# Patient Record
Sex: FEMALE | Race: BLACK OR AFRICAN AMERICAN | NOT HISPANIC OR LATINO | Employment: UNEMPLOYED | URBAN - METROPOLITAN AREA
[De-identification: names, ages, dates, MRNs, and addresses within clinical notes are randomized per-mention and may not be internally consistent; named-entity substitution may affect disease eponyms.]

---

## 2017-01-16 ENCOUNTER — OFFICE VISIT (OUTPATIENT)
Dept: ENDOCRINOLOGY | Facility: CLINIC | Age: 52
End: 2017-01-16

## 2017-01-16 VITALS
TEMPERATURE: 98.6 F | WEIGHT: 293 LBS | HEIGHT: 64 IN | OXYGEN SATURATION: 100 % | SYSTOLIC BLOOD PRESSURE: 132 MMHG | BODY MASS INDEX: 50.02 KG/M2 | HEART RATE: 89 BPM | DIASTOLIC BLOOD PRESSURE: 79 MMHG

## 2017-01-16 DIAGNOSIS — E66.01 MORBID OBESITY, UNSPECIFIED OBESITY TYPE (H): ICD-10-CM

## 2017-01-16 DIAGNOSIS — F50.9 EATING DISORDER: Primary | ICD-10-CM

## 2017-01-16 DIAGNOSIS — I45.9 RIGHT VENTRICULAR CONDUCTION DELAY: ICD-10-CM

## 2017-01-16 LAB
ANION GAP SERPL CALCULATED.3IONS-SCNC: 5 MMOL/L (ref 3–14)
BUN SERPL-MCNC: 10 MG/DL (ref 7–30)
CALCIUM SERPL-MCNC: 8.5 MG/DL (ref 8.5–10.1)
CHLORIDE SERPL-SCNC: 112 MMOL/L (ref 94–109)
CO2 SERPL-SCNC: 25 MMOL/L (ref 20–32)
CREAT SERPL-MCNC: 0.88 MG/DL (ref 0.52–1.04)
GFR SERPL CREATININE-BSD FRML MDRD: 68 ML/MIN/1.7M2
GLUCOSE SERPL-MCNC: 89 MG/DL (ref 70–99)
POTASSIUM SERPL-SCNC: 4.5 MMOL/L (ref 3.4–5.3)
SODIUM SERPL-SCNC: 142 MMOL/L (ref 133–144)

## 2017-01-16 RX ORDER — ACETAMINOPHEN 500 MG
500 TABLET ORAL
COMMUNITY
Start: 2016-05-24 | End: 2023-08-17

## 2017-01-16 RX ORDER — ALBUTEROL SULFATE 90 UG/1
1-2 AEROSOL, METERED RESPIRATORY (INHALATION)
COMMUNITY
Start: 2016-05-24

## 2017-01-16 RX ORDER — TOPIRAMATE 100 MG/1
100 TABLET, FILM COATED ORAL AT BEDTIME
Qty: 30 TABLET | Refills: 3 | Status: SHIPPED | OUTPATIENT
Start: 2017-01-16 | End: 2017-10-06

## 2017-01-16 ASSESSMENT — ENCOUNTER SYMPTOMS
HALLUCINATIONS: 0
NIGHT SWEATS: 0
PANIC: 0
WEIGHT GAIN: 1
DECREASED CONCENTRATION: 1
FATIGUE: 0
ALTERED TEMPERATURE REGULATION: 0
DEPRESSION: 1
WEIGHT LOSS: 0
POLYPHAGIA: 0
DECREASED APPETITE: 1
NERVOUS/ANXIOUS: 1
INCREASED ENERGY: 1
INSOMNIA: 1
FEVER: 0
CHILLS: 0
POLYDIPSIA: 0

## 2017-01-16 ASSESSMENT — PAIN SCALES - GENERAL: PAINLEVEL: EXTREME PAIN (8)

## 2017-01-16 NOTE — Clinical Note
2017       RE: Kameron Park  [address]  [facility] [address]     Dear Colleague,    Thank you for referring your patient, Kameron Park, to the [facility] MEDICAL WEIGHT MANAGEMENT at [facility]. Please see a copy of my visit note below.        Return Medical Weight Management Note     Kameron Park  MRN:  [id number]  :  1965  SHOAIB:  2017    Dear Veena Stafford,    I had the pleasure of seeing your patient Kameron Park.  She is a 51 year old female who I am continuing to see for treatment of morbid obesity related to:  Hypertension, Depression, Asthma, and back/knee pain    Weight change since last seen 6 months ago is up 3 pounds. Net change is down 36 pounds.    Patient has been working on the following dietary changes: reduced food portions    Patient has been working on the following activity changes: walking, pool therapy but limited by pain in knee, back    Meds: taking Topamax 100 mg without side effects (she self-titrated it up from 50 mg daily). She says it is helping her reduce her food intake. Patient was counseled about risks and side effects. Patient was told about teratogenic side effects and was cautioned about using a reliable method of birth control while using topamax ( she is s/p tubal ligation). Patient wants to continue topamax.    Labs: viewed through Care Everywhere link and creatinine was up to 1.05 as of 2016 at [facility] - she goes to [facility] more frequently (not on Epic)    A/P:  Morbid obesity, stable  Increase topamax from 50 back up to 100mg at  since weight is slightly up and she was losing weight on the higher dose  Cardiology referral to evaluate possible right ventricular conduction delay noted on EKG  - need clearance for phentermine    Patient Instructions:  Food goal: more baked, less fried foods, more water, more veggies  Activity goal: to continue to do yoga and wii-fit regularly,  "increase walking  Medication goal: go back up from 50 mg to 100 mg topiramate daily  Cardiology referral pending  Labs (basic metabolic panel) is pending today to recheck kidney function    RTC 3 months    Time: 25 min spent on evaluation, management, counseling, education, & motivational interviewing with greater than 50 % of the total time was spent on counseling and coordinating care    CURRENT WEIGHT:   300 lbs 8 oz    Wt Readings from Last 4 Encounters:   01/21/17 136.261 kg (300 lb 6.4 oz)   01/16/17 136.306 kg (300 lb 8 oz)   07/25/16 134.9 kg (297 lb 6.4 oz)   04/25/16 137.712 kg (303 lb 9.6 oz)     Height:  5' 4\"  Body Mass Index:  Body mass index is 51.56 kg/(m^2).  Vitals:  B/P: 132/79, P: 89    Diet and Activity Changes Since Last Visit Reviewed With Patient 7/25/2016   With regards to my diet, I am still struggling with: my knee back neck   For breakfast, I typically eat: eggs   For lunch, I typically eat: -   For supper, I typically eat: chicken  corn ri   For snack(s), I typically eat: -   I have made the following changes to my activity/exercise since my last visit: -   With regards to my activity/exercise, I am still struggling with: -     ROS    MEDICATIONS:   Current Outpatient Prescriptions   Medication     acetaminophen (TYLENOL) 500 MG tablet     albuterol (PROAIR HFA/PROVENTIL HFA/VENTOLIN HFA) 108 (90 BASE) MCG/ACT Inhaler     topiramate (TOPAMAX) 100 MG tablet     cetirizine (ZYRTEC) 10 MG tablet     meclizine 25 MG CHEW     LISINOPRIL PO     No current facility-administered medications for this visit.     Weight Loss Medication History Reviewed With Patient 1/16/2017   Which weight loss medications are you currently taking on a regular basis?  Qysmia (phentermine/topiramate)   Are you having any side effects from the weight loss medication that we have prescribed you? No     Sincerely,    Nadine Vallecillo MD        "

## 2017-01-16 NOTE — PATIENT INSTRUCTIONS
Food goal: more baked, less fried foods, more water, more veggies    Activity goal: to continue to do yoga and wii-fit regularly, increase walking    Medication goal: go back up from 50 mg to 100 mg topiramate daily    Cardiology referral pending    Labs (basic metabolic panel) is pending today to recheck kidney function

## 2017-01-16 NOTE — Clinical Note
Patient:  Kameron Park  :   1965  MRN:     [id number]        Ms.Charme CINTIA Park  [address]  [facility] [address]        2017    Dear ,    We are writing to inform you of your test results. Your kidney function testing (creatinine) came back in the normal range this time. Please share this with your primary clinic.      Resulted Orders   Basic metabolic panel   Result Value Ref Range    Sodium 142 133 - 144 mmol/L    Potassium 4.5 3.4 - 5.3 mmol/L    Chloride 112 (H) 94 - 109 mmol/L    Carbon Dioxide 25 20 - 32 mmol/L    Anion Gap 5 3 - 14 mmol/L    Glucose 89 70 - 99 mg/dL    Urea Nitrogen 10 7 - 30 mg/dL    Creatinine 0.88 0.52 - 1.04 mg/dL    GFR Estimate 68 >60 mL/min/1.7m2      Comment:      Non  GFR Calc    GFR Estimate If Black 83 >60 mL/min/1.7m2      Comment:       GFR Calc    Calcium 8.5 8.5 - 10.1 mg/dL       Nadine Vallecillo MD

## 2017-01-16 NOTE — MR AVS SNAPSHOT
After Visit Summary   1/16/2017    Kameron Park    MRN: [id number]           Patient Information     Date Of Birth          1965        Visit Information        Provider Department      1/16/2017 11:20 AM Nadine Vallecillo MD Medical Weight Management        Today's Diagnoses     Eating disorder    -  1     Right ventricular conduction delay         Morbid obesity, unspecified obesity type (H)           Care Instructions    Food goal: more baked, less fried foods, more water, more veggies    Activity goal: to continue to do yoga and wii-fit regularly, increase walking    Medication goal: go back up from 50 mg to 100 mg topiramate daily    Cardiology referral pending    Labs (basic metabolic panel) is pending today to recheck kidney function        Follow-ups after your visit        Additional Services     CARDIOLOGY EVAL ADULT REFERRAL       Your provider has referred you to:  [facility]: [facility] [phone]   [email or web address]    Please be aware that coverage of these services is subject to the terms and limitations of your health insurance plan.  Call member services at your health plan with any benefit or coverage questions.      Type of Referral:  New Cardiology Consult, for evaluation of possible right ventricular conduction delay noted on 2015 EKG. Need eval & ok for phentermine from cardiology.    Timeframe requested:  Within 1 month    Please bring the following to your appointment:  >>   Any x-rays, CTs or MRIs which have been performed.  Contact the facility where they were done to arrange for  prior to your scheduled appointment.    >>   List of current medications  >>   This referral request   >>   Any documents/labs given to you for this referral                  Follow-up notes from your care team     Return in about 2 months (around 3/16/2017).      Your next 10  appointments already scheduled     Jan 21, 2017  8:00 AM   (Arrive by 7:45 AM)   New Patient Visit with UZIEL Odonnell MD   [facility] ([facility])    [address]  3rd Floor  [facility] [id number] [phone]            Apr 24, 2017 11:40 AM   (Arrive by 11:25 AM)   Return Visit with Nadine Vallecillo MD   Medical Weight Management ([facility])    [address]  4th Floor  [facility] [address] [phone]              Who to contact     Please call your clinic at [phone] to:    Ask questions about your health    Make or cancel appointments    Discuss your medicines    Learn about your test results    Speak to your doctor   If you have compliments or concerns about an experience at your clinic, or if you wish to file a complaint, please contact [facility] Relations at [phone] or email us at [email or web address]         Additional Information About Your Visit        [employer] Information     [employer] gives you secure access to your electronic health record. If you see a primary care provider, you can also send messages to your care team and make appointments. If you have questions, please call your primary care clinic.  If you do not have a primary care provider, please call [phone] and they will assist you.      [employer] is an electronic gateway that provides easy, online access to your medical records. With [employer], you can request a clinic appointment, read your test results, renew a prescription or communicate with your care team.     To access your existing account, please contact your [facility] or call [phone] for assistance.        Care EveryWhere ID     This is your Care EveryWhere ID. This could be used by other organizations to access your [place] medical records  [id number]        Your Vitals Were     Pulse  "Temperature Height BMI (Body Mass Index) Pulse Oximetry       89 98.6  F (37  C) (Oral) 5' 4\" 51.56 kg/m2 100%        Blood Pressure from Last 3 Encounters:   01/16/17 132/79   07/25/16 138/84   04/25/16 126/72    Weight from Last 3 Encounters:   01/16/17 300 lb 8 oz   07/25/16 297 lb 6.4 oz   04/25/16 303 lb 9.6 oz              We Performed the Following     Basic metabolic panel     CARDIOLOGY EVAL ADULT REFERRAL          Today's Medication Changes          These changes are accurate as of: 1/16/17  1:57 PM.  If you have any questions, ask your nurse or doctor.               These medicines have changed or have updated prescriptions.        Dose/Directions    topiramate 100 MG tablet   Commonly known as:  TOPAMAX   This may have changed:  how much to take   Used for:  Eating disorder   Changed by:  Nadine Vallecillo MD        Dose:  100 mg   Take 1 tablet (100 mg) by mouth At Bedtime   Quantity:  30 tablet   Refills:  3            Where to get your medicines      These medications were sent to [id number] [address] [address]     Phone:  [phone]    - topiramate 100 MG tablet             Primary Care Provider Office Phone # Fax #    Veena Stafford [phone]       [address] [address]        Thank you!     Thank you for choosing MEDICAL WEIGHT MANAGEMENT  for your care. Our goal is always to provide you with excellent care. Hearing back from our patients is one way we can continue to improve our services. Please take a few minutes to complete the written survey that you may receive in the mail after your visit with us. Thank you!             Your Updated Medication List - Protect others around you: Learn how to safely use, store and throw away your medicines at www.disposemymeds.org.          This list is accurate as of: 1/16/17  1:57 PM.  Always use your most recent " med list.                   Brand Name Dispense Instructions for use    acetaminophen 500 MG tablet    TYLENOL     Take 500 mg by mouth       albuterol 108 (90 BASE) MCG/ACT Inhaler    PROAIR HFA/PROVENTIL HFA/VENTOLIN HFA     Inhale 1-2 puffs into the lungs       cetirizine 10 MG tablet    zyrTEC     Take 10 mg by mouth daily       LISINOPRIL PO      Take 10 mg by mouth daily       meclizine 25 MG Chew      Take 25 mg by mouth 3 times daily       topiramate 100 MG tablet    TOPAMAX    30 tablet    Take 1 tablet (100 mg) by mouth At Bedtime

## 2017-01-16 NOTE — NURSING NOTE
"Chief Complaint   Patient presents with     Follow Up For     weight management       Filed Vitals:    01/16/17 1149   BP: 132/79   Pulse: 89   Temp: 98.6  F (37  C)   TempSrc: Oral   Height: 5' 4\"   Weight: 300 lb 8 oz   SpO2: 100%       Body mass index is 51.56 kg/(m^2).    Liliya Sheffield                            "

## 2017-01-19 ENCOUNTER — PRE VISIT (OUTPATIENT)
Dept: CARDIOLOGY | Facility: CLINIC | Age: 52
End: 2017-01-19

## 2017-01-19 DIAGNOSIS — I10 HTN (HYPERTENSION): Primary | ICD-10-CM

## 2017-01-21 ENCOUNTER — OFFICE VISIT (OUTPATIENT)
Dept: CARDIOLOGY | Facility: CLINIC | Age: 52
End: 2017-01-21
Attending: INTERNAL MEDICINE
Payer: COMMERCIAL

## 2017-01-21 VITALS
WEIGHT: 293 LBS | HEART RATE: 78 BPM | HEIGHT: 64 IN | OXYGEN SATURATION: 99 % | BODY MASS INDEX: 50.02 KG/M2 | SYSTOLIC BLOOD PRESSURE: 121 MMHG | DIASTOLIC BLOOD PRESSURE: 83 MMHG

## 2017-01-21 DIAGNOSIS — E66.01 MORBID OBESITY DUE TO EXCESS CALORIES (H): Primary | ICD-10-CM

## 2017-01-21 PROCEDURE — 93005 ELECTROCARDIOGRAM TRACING: CPT | Mod: ZF

## 2017-01-21 PROCEDURE — 99212 OFFICE O/P EST SF 10 MIN: CPT | Mod: ZF

## 2017-01-21 PROCEDURE — 93010 ELECTROCARDIOGRAM REPORT: CPT | Mod: ZP | Performed by: INTERNAL MEDICINE

## 2017-01-21 PROCEDURE — 99204 OFFICE O/P NEW MOD 45 MIN: CPT | Performed by: INTERNAL MEDICINE

## 2017-01-21 ASSESSMENT — ENCOUNTER SYMPTOMS
CHILLS: 0
INCREASED ENERGY: 1
NERVOUS/ANXIOUS: 1
PANIC: 0
FATIGUE: 0
POLYPHAGIA: 0
DECREASED CONCENTRATION: 1
FEVER: 0
WEIGHT GAIN: 1
WEIGHT LOSS: 0
NIGHT SWEATS: 0
HALLUCINATIONS: 0
POLYDIPSIA: 0
INSOMNIA: 1
DEPRESSION: 1
DECREASED APPETITE: 1
ALTERED TEMPERATURE REGULATION: 1

## 2017-01-21 ASSESSMENT — PAIN SCALES - GENERAL: PAINLEVEL: NO PAIN (0)

## 2017-01-21 NOTE — PROGRESS NOTES
SUBJECTIVE:  Kameron Park is a 51 year old female who presents for evaluation prior to starting weight loss medicine probably Phentermine.  Morbidly obese. Not very active. Denied cardiac symptoms including chest pain,palpitation. Do have SOB due to asthma. Current smoker.No DM.HTN+No other significant past medical history or family history. Premature CAD in family.    Patient Active Problem List    Diagnosis Date Noted     HTN (hypertension) 01/19/2017     Priority: Medium     Morbid obesity (H) 08/07/2014     Priority: Medium     Eating disorder 08/07/2014     Priority: Medium     Problem list name updated by automated process. Provider to review       Depression 08/07/2014     Priority: Medium     Knee pain 08/07/2014     Priority: Medium     Back pain 08/07/2014     Priority: Medium    .  Current Outpatient Prescriptions   Medication Sig     acetaminophen (TYLENOL) 500 MG tablet Take 500 mg by mouth     albuterol (PROAIR HFA/PROVENTIL HFA/VENTOLIN HFA) 108 (90 BASE) MCG/ACT Inhaler Inhale 1-2 puffs into the lungs     topiramate (TOPAMAX) 100 MG tablet Take 1 tablet (100 mg) by mouth At Bedtime     cetirizine (ZYRTEC) 10 MG tablet Take 10 mg by mouth as needed      meclizine 25 MG CHEW Take 25 mg by mouth 3 times daily     LISINOPRIL PO Take 10 mg by mouth daily     No current facility-administered medications for this visit.     History reviewed. No pertinent past medical history.  History reviewed. No pertinent past surgical history.  Allergies   Allergen Reactions     Fish-Derived Products Anaphylaxis     Seafood Swelling     Seasonal Allergies      Other reaction(s): Runny Nose     Social History     Social History     Marital Status:      Spouse Name: N/A     Number of Children: N/A     Years of Education: N/A     Occupational History     Not on file.     Social History Main Topics     Smoking status: Current Every Day Smoker -- 0.10 packs/day     Types: Cigarettes     Smokeless tobacco: Never  "Used     Alcohol Use: Yes     Drug Use: Not on file     Sexual Activity: Not on file     Other Topics Concern     Not on file     Social History Narrative     History reviewed. No pertinent family history.       REVIEW OF SYSTEMS:  General: negative, fever, chills, night sweats  Skin: negative, acne, rash and scaling  Eyes: negative, double vision, eye pain and photophobia  Ears/Nose/Throat: negative, nasal congestion and purulent rhinorrhea  Respiratory: No dyspnea on exertion, No cough and negative  Cardiovascular: negative, palpitations, tachycardia, irregular heart beat, chest pain, exertional chest pain or pressure, paroxysmal nocturnal dyspnea and orthopnea  Gastrointestinal: negative, dysphagia, nausea and vomiting  Genitourinary: negative, nocturia, dysuria and frequency  Musculoskeletal: negative, fracture, back pain and neck pain  Neurologic: negative, headaches, syncope, stroke and paralysis  Psychiatric: negative, nervous breakdown, depression stable and thoughts of self-harm  Hematologic/Lymphatic/Immunologic: negative, bleeding disorder, chills and fever  Endocrine: negative, cold intolerance, heat intolerance and hot flashes       OBJECTIVE:  Blood pressure 121/83, pulse 78, height 1.626 m (5' 4\"), weight 136.261 kg (300 lb 6.4 oz), SpO2 99 %.  General Appearance: alert and no distress  Head: Normocephalic. No masses, lesions, tenderness or abnormalities  Eyes: conjuctiva clear, PERRL, EOM intact  Ears: External ears normal. Canals clear. TM's normal.  Nose: Nares normal  Mouth: normal  Neck: Supple, no cervical adenopathy, no thyromegaly  Lungs: clear to auscultation  Cardiac: regular rate and rhythm, normal S1 and S2, no murmur  Abdomen: Soft, nontender.  Normal bowel sounds.  No hepatosplenomegaly or abnormal masses  Extremities: no peripheral edema, peripheral pulses normal  Musculoskeletal: negative  Neurological: Cranial nerves 2-12 intact, motor strength intact       ASSESSMENT/PLAN:    51 yr " old morbidly obese female on a weightloss program.Planning to start on medication,probably Phentermine.  Patient not very active,but denied cardiac symptoms apart from SOB due to Asthma since childhood.  Current smoker. No DM.HTN+ Lipids unknown. No premature CAD in family.  Will check an echo for baseline information.  EKG reviewed.NSR.Normal.    Patient may have a short course of therapy.Long term usage should be avoided as it may cause cardiac issues.  F/U PRN.  Answers for HPI/ROS submitted by the patient on 1/21/2017   General Symptoms: Yes  Skin Symptoms: No  HENT Symptoms: No  EYE SYMPTOMS: No  HEART SYMPTOMS: No  LUNG SYMPTOMS: No  INTESTINAL SYMPTOMS: No  URINARY SYMPTOMS: No  GYNECOLOGIC SYMPTOMS: No  BREAST SYMPTOMS: No  SKELETAL SYMPTOMS: No  BLOOD SYMPTOMS: No  NERVOUS SYSTEM SYMPTOMS: No  MENTAL HEALTH SYMPTOMS: Yes  Fever: No  Loss of appetite: Yes  Weight loss: No  Weight gain: Yes  Fatigue: No  Night sweats: No  Chills: No  Increased stress: Yes  Excessive hunger: No  Excessive thirst: No  Feeling hot or cold when others believe the temperature is normal: Yes  Loss of height: No  Post-operative complications: No  Surgical site pain: No  Hallucinations: No  Change in or Loss of Energy: Yes  Hyperactivity: No  Confusion: No  Nervous or Anxious: Yes  Depression: Yes  Trouble sleeping: Yes  Trouble thinking or concentrating: Yes  Mood changes: Yes  Panic attacks: No

## 2017-01-21 NOTE — NURSING NOTE
Cardiac Testing: Patient given instructions regarding  echocardiogram . Discussed purpose, preparation, procedure and when to expect results reported back to the patient. Patient demonstrated understanding of this information and agreed to call with further questions or concerns.    Med Reconcile: Reviewed and verified all current medications with the patient. The updated medication list was printed and given to the patient.    Return Appointment: Follow up to be determined based on results of testing. Patient given instructions regarding scheduling next clinic visit. Patient demonstrated understanding of this information and agreed to call with further questions or concerns.    Patient stated she understood all health information given and agreed to call with further questions or concerns.    Del Armstrong, RN  RN Care Coordinator  [facility]  [phone]

## 2017-01-21 NOTE — Clinical Note
1/21/2017      RE: Kameron Park  [address]  [facility] [address]       Dear Colleague,    Thank you for the opportunity to participate in the care of your patient, Kameron Park, at the [facility] at [facility]. Please see a copy of my visit note below.       SUBJECTIVE:  Kameron Park is a 51 year old female who presents for evaluation prior to starting weight loss medicine probably Phentermine.  Morbidly obese. Not very active. Denied cardiac symptoms including chest pain,palpitation. Do have SOB due to asthma. Current smoker.No DM.HTN+No other significant past medical history or family history. Premature CAD in family.    Patient Active Problem List    Diagnosis Date Noted     HTN (hypertension) 01/19/2017     Priority: Medium     Morbid obesity (H) 08/07/2014     Priority: Medium     Eating disorder 08/07/2014     Priority: Medium     Problem list name updated by automated process. Provider to review       Depression 08/07/2014     Priority: Medium     Knee pain 08/07/2014     Priority: Medium     Back pain 08/07/2014     Priority: Medium    .  Current Outpatient Prescriptions   Medication Sig     acetaminophen (TYLENOL) 500 MG tablet Take 500 mg by mouth     albuterol (PROAIR HFA/PROVENTIL HFA/VENTOLIN HFA) 108 (90 BASE) MCG/ACT Inhaler Inhale 1-2 puffs into the lungs     topiramate (TOPAMAX) 100 MG tablet Take 1 tablet (100 mg) by mouth At Bedtime     cetirizine (ZYRTEC) 10 MG tablet Take 10 mg by mouth as needed      meclizine 25 MG CHEW Take 25 mg by mouth 3 times daily     LISINOPRIL PO Take 10 mg by mouth daily     No current facility-administered medications for this visit.     History reviewed. No pertinent past medical history.  History reviewed. No pertinent past surgical history.  Allergies   Allergen Reactions     Fish-Derived Products Anaphylaxis     Seafood Swelling     Seasonal Allergies      Other reaction(s): Runny Nose  "    Social History     Social History     Marital Status:      Spouse Name: N/A     Number of Children: N/A     Years of Education: N/A     Occupational History     Not on file.     Social History Main Topics     Smoking status: Current Every Day Smoker -- 0.10 packs/day     Types: Cigarettes     Smokeless tobacco: Never Used     Alcohol Use: Yes     Drug Use: Not on file     Sexual Activity: Not on file     Other Topics Concern     Not on file     Social History Narrative     History reviewed. No pertinent family history.       REVIEW OF SYSTEMS:  General: negative, fever, chills, night sweats  Skin: negative, acne, rash and scaling  Eyes: negative, double vision, eye pain and photophobia  Ears/Nose/Throat: negative, nasal congestion and purulent rhinorrhea  Respiratory: No dyspnea on exertion, No cough and negative  Cardiovascular: negative, palpitations, tachycardia, irregular heart beat, chest pain, exertional chest pain or pressure, paroxysmal nocturnal dyspnea and orthopnea  Gastrointestinal: negative, dysphagia, nausea and vomiting  Genitourinary: negative, nocturia, dysuria and frequency  Musculoskeletal: negative, fracture, back pain and neck pain  Neurologic: negative, headaches, syncope, stroke and paralysis  Psychiatric: negative, nervous breakdown, depression stable and thoughts of self-harm  Hematologic/Lymphatic/Immunologic: negative, bleeding disorder, chills and fever  Endocrine: negative, cold intolerance, heat intolerance and hot flashes       OBJECTIVE:  Blood pressure 121/83, pulse 78, height 1.626 m (5' 4\"), weight 136.261 kg (300 lb 6.4 oz), SpO2 99 %.  General Appearance: alert and no distress  Head: Normocephalic. No masses, lesions, tenderness or abnormalities  Eyes: conjuctiva clear, PERRL, EOM intact  Ears: External ears normal. Canals clear. TM's normal.  Nose: Nares normal  Mouth: normal  Neck: Supple, no cervical adenopathy, no thyromegaly  Lungs: clear to " auscultation  Cardiac: regular rate and rhythm, normal S1 and S2, no murmur  Abdomen: Soft, nontender.  Normal bowel sounds.  No hepatosplenomegaly or abnormal masses  Extremities: no peripheral edema, peripheral pulses normal  Musculoskeletal: negative  Neurological: Cranial nerves 2-12 intact, motor strength intact       ASSESSMENT/PLAN:    51 yr old morbidly obese female on a weightloss program.Planning to start on medication,probably Phentermine.  Patient not very active,but denied cardiac symptoms apart from SOB due to Asthma since childhood.  Current smoker. No DM.HTN+ Lipids unknown. No premature CAD in family.  Will check an echo for baseline information.  EKG reviewed.NSR.Normal.    Patient may have a short course of therapy.Long term usage should be avoided as it may cause cardiac issues.  F/U PRN.  Answers for HPI/ROS submitted by the patient on 1/21/2017   General Symptoms: Yes  Skin Symptoms: No  HENT Symptoms: No  EYE SYMPTOMS: No  HEART SYMPTOMS: No  LUNG SYMPTOMS: No  INTESTINAL SYMPTOMS: No  URINARY SYMPTOMS: No  GYNECOLOGIC SYMPTOMS: No  BREAST SYMPTOMS: No  SKELETAL SYMPTOMS: No  BLOOD SYMPTOMS: No  NERVOUS SYSTEM SYMPTOMS: No  MENTAL HEALTH SYMPTOMS: Yes  Fever: No  Loss of appetite: Yes  Weight loss: No  Weight gain: Yes  Fatigue: No  Night sweats: No  Chills: No  Increased stress: Yes  Excessive hunger: No  Excessive thirst: No  Feeling hot or cold when others believe the temperature is normal: Yes  Loss of height: No  Post-operative complications: No  Surgical site pain: No  Hallucinations: No  Change in or Loss of Energy: Yes  Hyperactivity: No  Confusion: No  Nervous or Anxious: Yes  Depression: Yes  Trouble sleeping: Yes  Trouble thinking or concentrating: Yes  Mood changes: Yes  Panic attacks: No    UZIEL Odonnell MD

## 2017-01-21 NOTE — PATIENT INSTRUCTIONS
You were seen today in the Cardiovascular Clinic at the [facility].     Cardiology Providers you saw during your visit: Dr MURIEL Rios    Diagnosis: Cardiology Clearance for use of Phentermine    Results: Dr Rios reviewed the results of your EKG with you in clinic today    Recommendations:   1.  We will schedule an Echocardiogram    Follow-up: Follow up to be determined based on results of echo      For emergencies call 911.    For any scheduling needs or nursing related questions, please call [phone].    Thank you for your visit today! If you have questions or concerns about today's visit, please call me.      Del Armstrong RN  RN Care Coordinator  [facility]  [phone]    Press option 1 for the [place] and then 3 for nursing related questions

## 2017-01-21 NOTE — MR AVS SNAPSHOT
After Visit Summary   1/21/2017    Kameron Park    MRN: [id number]           Patient Information     Date Of Birth          1965        Visit Information        Provider Department      1/21/2017 8:00 AM UZIEL Odonnell MD [facility] Heart Care        Today's Diagnoses     HTN (hypertension)           Care Instructions    You were seen today in the Cardiovascular Clinic at the [facility].     Cardiology Providers you saw during your visit: Dr MURIEL Rios    Diagnosis: Cardiology Clearance for use of Phentermine    Results: Dr Rios reviewed the results of your EKG with you in clinic today    Recommendations:   1.  We will schedule an Echocardiogram    Follow-up: Follow up to be determined based on results of echo      For emergencies call 911.    For any scheduling needs or nursing related questions, please call [phone].    Thank you for your visit today! If you have questions or concerns about today's visit, please call me.      Del Armstrong RN  RN Care Coordinator  [facility]  [phone]    Press option 1 for the [employer] and then 3 for nursing related questions                Follow-ups after your visit        Your next 10 appointments already scheduled     Jan 26, 2017 10:00 AM   Ech Complete with [id number]   [facility] Echo ([facility])    [address] [place] [address] [phone]           1.  Please bring or wear a comfortable two-piece outfit. 2.  You may eat, drink and take your normal medicines. 3.  For any questions that cannot be answered, please contact the ordering physician            Apr 24, 2017 11:40 AM   (Arrive by 11:25 AM)   Return Visit with Nadine Vallecillo MD   [facility] Medical Weight Management ([facility])    [address] [address] [phone]              Future tests that were ordered for you  "today     Open Future Orders        Priority Expected Expires Ordered    Echocardiogram Complete Routine  1/21/2018 1/21/2017            Who to contact     If you have questions or need follow up information about today's clinic visit or your schedule please contact [facility] directly at [phone].  Normal or non-critical lab and imaging results will be communicated to you by Priviahart, letter or phone within 4 business days after the clinic has received the results. If you do not hear from us within 7 days, please contact the clinic through Priviahart or phone. If you have a critical or abnormal lab result, we will notify you by phone as soon as possible.  Submit refill requests through Webcrumbz or call your pharmacy and they will forward the refill request to us. Please allow 3 business days for your refill to be completed.          Additional Information About Your Visit        Priviahart Information     Webcrumbz gives you secure access to your electronic health record. If you see a primary care provider, you can also send messages to your care team and make appointments. If you have questions, please call your primary care clinic.  If you do not have a primary care provider, please call [phone] and they will assist you.        Care EveryWhere ID     This is your Care EveryWhere ID. This could be used by other organizations to access your [place] medical records  [id number]        Your Vitals Were     Pulse Height BMI (Body Mass Index) Pulse Oximetry          78 1.626 m (5' 4\") 51.54 kg/m2 99%         Blood Pressure from Last 3 Encounters:   01/21/17 121/83   01/16/17 132/79   07/25/16 138/84    Weight from Last 3 Encounters:   01/21/17 136.261 kg (300 lb 6.4 oz)   01/16/17 136.306 kg (300 lb 8 oz)   07/25/16 134.9 kg (297 lb 6.4 oz)              We Performed the Following     EKG 12-lead, tracing only (Future)        Primary Care Provider Office Phone # Fax #    Veena Stafford [phone] " [phone]       [facility]  [address] [facility] [address]        Thank you!     Thank you for choosing [facility]  for your care. Our goal is always to provide you with excellent care. Hearing back from our patients is one way we can continue to improve our services. Please take a few minutes to complete the written survey that you may receive in the mail after your visit with us. Thank you!             Your Updated Medication List - Protect others around you: Learn how to safely use, store and throw away your medicines at www.disposemymeds.org.          This list is accurate as of: 1/21/17  8:07 AM.  Always use your most recent med list.                   Brand Name Dispense Instructions for use    acetaminophen 500 MG tablet    TYLENOL     Take 500 mg by mouth       albuterol 108 (90 BASE) MCG/ACT Inhaler    PROAIR HFA/PROVENTIL HFA/VENTOLIN HFA     Inhale 1-2 puffs into the lungs       cetirizine 10 MG tablet    zyrTEC     Take 10 mg by mouth as needed       LISINOPRIL PO      Take 10 mg by mouth daily       meclizine 25 MG Chew      Take 25 mg by mouth 3 times daily       topiramate 100 MG tablet    TOPAMAX    30 tablet    Take 1 tablet (100 mg) by mouth At Bedtime

## 2017-01-23 LAB — INTERPRETATION ECG - MUSE: NORMAL

## 2017-01-23 NOTE — PROGRESS NOTES
Return Medical Weight Management Note     Kameron Park  MRN:  [id number]  :  1965  SHOAIB:  2017    Dear Veena Stafford,    I had the pleasure of seeing your patient Kameron Park.  She is a 51 year old female who I am continuing to see for treatment of morbid obesity related to:  Hypertension, Depression, Asthma, and back/knee pain    Weight change since last seen 6 months ago is up 3 pounds. Net change is down 36 pounds.    Patient has been working on the following dietary changes: reduced food portions    Patient has been working on the following activity changes: walking, pool therapy but limited by pain in knee, back    Meds: taking Topamax 100 mg without side effects (she self-titrated it up from 50 mg daily). She says it is helping her reduce her food intake. Patient was counseled about risks and side effects. Patient was told about teratogenic side effects and was cautioned about using a reliable method of birth control while using topamax ( she is s/p tubal ligation). Patient wants to continue topamax.    Labs: viewed through Care Everywhere link and creatinine was up to 1.05 as of 2016 at [facility] - she goes to [facility] more frequently (not on Epic)    A/P:  Morbid obesity, stable  Increase topamax from 50 back up to 100mg at hs since weight is slightly up and she was losing weight on the higher dose  Cardiology referral to evaluate possible right ventricular conduction delay noted on EKG  - need clearance for phentermine    Patient Instructions:  Food goal: more baked, less fried foods, more water, more veggies  Activity goal: to continue to do yoga and wii-fit regularly, increase walking  Medication goal: go back up from 50 mg to 100 mg topiramate daily  Cardiology referral pending  Labs (basic metabolic panel) is pending today to recheck kidney function    RTC 3 months    Time: 25 min spent on evaluation, management, counseling, education, & motivational interviewing with  "greater than 50 % of the total time was spent on counseling and coordinating care    CURRENT WEIGHT:   300 lbs 8 oz    Wt Readings from Last 4 Encounters:   01/21/17 136.261 kg (300 lb 6.4 oz)   01/16/17 136.306 kg (300 lb 8 oz)   07/25/16 134.9 kg (297 lb 6.4 oz)   04/25/16 137.712 kg (303 lb 9.6 oz)     Height:  5' 4\"  Body Mass Index:  Body mass index is 51.56 kg/(m^2).  Vitals:  B/P: 132/79, P: 89    Diet and Activity Changes Since Last Visit Reviewed With Patient 7/25/2016   With regards to my diet, I am still struggling with: my knee back neck   For breakfast, I typically eat: eggs   For lunch, I typically eat: -   For supper, I typically eat: chicken  corn ri   For snack(s), I typically eat: -   I have made the following changes to my activity/exercise since my last visit: -   With regards to my activity/exercise, I am still struggling with: -     ROS    MEDICATIONS:   Current Outpatient Prescriptions   Medication     acetaminophen (TYLENOL) 500 MG tablet     albuterol (PROAIR HFA/PROVENTIL HFA/VENTOLIN HFA) 108 (90 BASE) MCG/ACT Inhaler     topiramate (TOPAMAX) 100 MG tablet     cetirizine (ZYRTEC) 10 MG tablet     meclizine 25 MG CHEW     LISINOPRIL PO     No current facility-administered medications for this visit.     Weight Loss Medication History Reviewed With Patient 1/16/2017   Which weight loss medications are you currently taking on a regular basis?  Qysmia (phentermine/topiramate)   Are you having any side effects from the weight loss medication that we have prescribed you? No     Sincerely,    Nadine Vallecillo MD  "

## 2017-01-26 ENCOUNTER — RADIANT APPOINTMENT (OUTPATIENT)
Dept: CARDIOLOGY | Facility: CLINIC | Age: 52
End: 2017-01-26

## 2017-01-26 DIAGNOSIS — I10 HYPERTENSION: ICD-10-CM

## 2017-01-26 RX ADMIN — Medication 3 ML: at 11:15

## 2017-05-08 ENCOUNTER — OFFICE VISIT (OUTPATIENT)
Dept: ENDOCRINOLOGY | Facility: CLINIC | Age: 52
End: 2017-05-08

## 2017-05-08 VITALS
HEART RATE: 81 BPM | DIASTOLIC BLOOD PRESSURE: 90 MMHG | TEMPERATURE: 99 F | BODY MASS INDEX: 50.02 KG/M2 | WEIGHT: 293 LBS | HEIGHT: 64 IN | OXYGEN SATURATION: 95 % | SYSTOLIC BLOOD PRESSURE: 138 MMHG

## 2017-05-08 DIAGNOSIS — E66.01 MORBID OBESITY DUE TO EXCESS CALORIES (H): Primary | ICD-10-CM

## 2017-05-08 RX ORDER — PHENTERMINE HYDROCHLORIDE 37.5 MG/1
TABLET ORAL
Qty: 14 TABLET | Refills: 3 | Status: SHIPPED | OUTPATIENT
Start: 2017-05-08 | End: 2023-08-17

## 2017-05-08 ASSESSMENT — PAIN SCALES - GENERAL: PAINLEVEL: NO PAIN (0)

## 2017-05-08 NOTE — LETTER
"2017     RE: Kameron Park  [address]  [place] [address]     Dear Colleague,    Thank you for referring your patient, Kameron Park, to the [facility] MEDICAL WEIGHT MANAGEMENT at [facility]. Please see a copy of my visit note below.        Return Medical Weight Management Note     Kameron Park  MRN:  [id number]  :  1965  SHOAIB:  2017    Dear Veena Stafford,    I had the pleasure of seeing your patient Kameron Park.  She is a 52 year old female who I am continuing to see for treatment of obesity related to:    Hypertension, Depression, Asthma, and back/knee pain    INTERVAL HISTORY:  Making good lifestyle changes. Cardiology, Dr. Rios, ok'd her for phentermine. I had referred her for a possible RV conduction delay on EKG. Is taking topiramate at  without much recent good effect on weight. Blood pressure borderline high today.    CURRENT WEIGHT:   304 lbs 4.8 oz    Wt Readings from Last 4 Encounters:   17 (!) 138 kg (304 lb 4.8 oz)   17 (!) 136.3 kg (300 lb 6.4 oz)   17 (!) 136.3 kg (300 lb 8 oz)   16 134.9 kg (297 lb 6.4 oz)       Height:  5' 4\"  Body Mass Index:  Body mass index is 52.23 kg/(m^2).  Vitals:  B/P: 138/90, P: 81    Initial consult weight was 325 lbs on 14.  Weight change since last seen on 2017 is up 4 pounds.   Total loss is 21 pounds.    Diet and Activity Changes Since Last Visit Reviewed With Patient 2017   I have made the following changes to my diet since my last visit: eat less   With regards to my diet, I am still struggling with: my weight   For breakfast, I typically eat: egg   For lunch, I typically eat: -   For supper, I typically eat: chicken rice    For snack(s), I typically eat: -   I have made the following changes to my activity/exercise since my last visit: same thing walking   With regards to my activity/exercise, I am still struggling with: -     ROS    MEDICATIONS: "   Current Outpatient Prescriptions   Medication     phentermine (ADIPEX-P) 37.5 MG tablet     acetaminophen (TYLENOL) 500 MG tablet     albuterol (PROAIR HFA/PROVENTIL HFA/VENTOLIN HFA) 108 (90 BASE) MCG/ACT Inhaler     topiramate (TOPAMAX) 100 MG tablet     cetirizine (ZYRTEC) 10 MG tablet     meclizine 25 MG CHEW     LISINOPRIL PO     No current facility-administered medications for this visit.        Weight Loss Medication History Reviewed With Patient 5/8/2017   Which weight loss medications are you currently taking on a regular basis?  Topamax (topiramate)   Are you having any side effects from the weight loss medication that we have prescribed you? No     ASSESSMENT:   Morbid obesity, improving, weight gain on topiramate. Need for appetite suppressant.    PLAN:   Food goal: 1,500 calorie meal plan using meal replacements to get a 1lbs weekly of weight loss without exercise     Use meal replacements such as Berta's meals, Lean Cuisines, Healthy Choice, Smart Ones, Weight Watchers Meals, and Slim Fast and Glucerna shakes and supplement with fresh fruits and vegetables  Please drink a lot of water daily. Most people typically need about 2 liters of water daily and more if they are exercising, have a large weight, or have a fever or illness. Add Crystal Light for flavoring if desired. But no pop with calories in it.  Please keep a food journal of what you eat, calories in what you eat, and mood and bring the journal with you to your next appointment.  Consider using applications for smart phones such as [employer], [employer], Brazen [employer]ist[employer]ipes, LoseIt, Tap&Track, and RelaxM.  Focus on wet volumetrics, meaning, eat more foods that are high in water and fiber such as fruits and vegetables in order to get that full feeling. These are also good for your overall health as well.  Check out Dr. Nini Baird from [facility] - she has cookbooks with low calorie volumetric recipes  You can try Let's Dish to help you prepare  meals for you and your family. Often times, the caloric and nutrition data and serving sizes are available for this food. This can be a time saving maneuver. The website can give you more information http://www.Sensoraide/  H[employer] Delivers has Let's Dish & fresh low calorie salads  Check out Hello Fresh at https://www."[employer]/food-boxes/classic-box/  Try Cooking Light recipes for low calorie meal preparation and planning  Other food plan options you can search for on the internet and check out include: Benjie CHOI, [facility]  Please consider health psychologist to discuss how depression and/or anxiety impact your eating.  Progressively increase physical activity to 60 minutes, including combination of cardio & resistance training x 5 days weekly.  Plan to go to the [place] [facility] to get an exercise assessment and recommendation. You can either plan to complete the entire program there or take your new skills to the gym of your choice. If you do not hear from an exercise professional from the program within a week, please call our clinic nurse at [phone].  Consider hiring a  to develop a structured progressive workout plan that includes both cardio and resistance training. Obesity is a disease according to the IRS, so you may be able to use some pre-tax dollars from your medical flexible spending account if you get a letter from your doctor and/or fill out a plan-specific form.     Activity goal: more walking, daily walking     Blood pressure goal: <135/85 to continue or increase phentermine dose      Medication goal: to add phentermine 1/2 tab daily to current regime of topiramate 200 mg at bedtime & to remember to take lisinopril daily, as blood pressure may go up on this new medication    FOLLOW-UP:    8 weeks.    Time: 30 min spent on evaluation, management, counseling, education, & motivational interviewing with greater than 50% of the total time was spent on  counseling and coordinating care    Sincerely,    Nadine Vallecillo MD

## 2017-05-08 NOTE — NURSING NOTE
"(   Chief Complaint   Patient presents with     Weight Loss     3 month follow up weight management    )    ( Weight: (!) 304 lb 4.8 oz )  ( Height: 5' 4\" )  ( BMI (Calculated): 52.34 )  (   )  (   )  (   )  (   )  (   )  (   )    ( BP: 138/90 )  (   )  ( Temp: 99  F (37.2  C) )  ( Temp src: Oral )  ( Pulse: 81 )  (   )  ( SpO2: 95 % )    (   Patient Active Problem List   Diagnosis     Morbid obesity (H)     Eating disorder     Depression     Knee pain     Back pain     HTN (hypertension)    )  (   Current Outpatient Prescriptions   Medication Sig Dispense Refill     acetaminophen (TYLENOL) 500 MG tablet Take 500 mg by mouth       albuterol (PROAIR HFA/PROVENTIL HFA/VENTOLIN HFA) 108 (90 BASE) MCG/ACT Inhaler Inhale 1-2 puffs into the lungs       topiramate (TOPAMAX) 100 MG tablet Take 1 tablet (100 mg) by mouth At Bedtime 30 tablet 3     cetirizine (ZYRTEC) 10 MG tablet Take 10 mg by mouth as needed        meclizine 25 MG CHEW Take 25 mg by mouth 3 times daily       LISINOPRIL PO Take 10 mg by mouth daily      )  ( Diabetes Eval:    )    ( Pain Eval:  No Pain (0) )      History   Smoking Status     Current Every Day Smoker     Packs/day: 0.10     Types: Cigarettes   Smokeless Tobacco     Never Used    )    ( Signed By:  Liliya Sheffield; May 8, 2017; 10:42 AM )    "

## 2017-05-08 NOTE — PATIENT INSTRUCTIONS
Food goal: 1,500 calorie meal plan using meal replacements to get a 1lbs weekly of weight loss without exercise    Use meal replacements such as Berta's meals, Lean Cuisines, Healthy Choice, Smart Ones, Weight Watchers Meals, and Slim Fast and Glucerna shakes and supplement with fresh fruits and vegetables  Please drink a lot of water daily. Most people typically need about 2 liters of water daily and more if they are exercising, have a large weight, or have a fever or illness. Add Crystal Light for flavoring if desired. But no pop with calories in it.  Please keep a food journal of what you eat, calories in what you eat, and mood and bring the journal with you to your next appointment.  Consider using applications for smart phones such as Enigmatec, GlycoPure, [employer], LoseIt, Tap&Track, and RelaxM.  Focus on wet volumetrics, meaning, eat more foods that are high in water and fiber such as fruits and vegetables in order to get that full feeling. These are also good for your overall health as well.  Check out Dr. Nini Baird from [facility] - she has cookbooks with low calorie volumetric recipes  You can try Let's Dish to help you prepare meals for you and your family. Often times, the caloric and nutrition data and serving sizes are available for this food. This can be a time saving maneuver. The website can give you more information http://www.DreamsCloud.skedge.me/  Cob[employer]'s Delivers has Let's Dish & fresh low calorie salads  Check out Hello Fresh at https://www.Cem[employer]ore Systems/food-boxes/classic-box/  Try Cooking Light recipes for low calorie meal preparation and planning  Other food plan options you can search for on the internet and check out include: Benjie CHOI, [facility]  Please consider health psychologist to discuss how depression and/or anxiety impact your eating.  Progressively increase physical activity to 60 minutes, including combination of cardio & resistance training x 5 days weekly.  Plan to go to  the [place] [facility] to get an exercise assessment and recommendation. You can either plan to complete the entire program there or take your new skills to the gym of your choice. If you do not hear from an exercise professional from the program within a week, please call our clinic nurse at [phone].  Consider hiring a  to develop a structured progressive workout plan that includes both cardio and resistance training. Obesity is a disease according to the IRS, so you may be able to use some pre-tax dollars from your medical flexible spending account if you get a letter from your doctor and/or fill out a plan-specific form.    Activity goal: more walking, daily walking    Blood pressure goal: <135/85 to continue or increase phentermine dose    Medication goal: to add phentermine 1/2 tab daily to current regime of topiramate 200 mg at bedtime & to remember to take lisinopril daily, as blood pressure may go up on this new medication

## 2017-05-08 NOTE — MR AVS SNAPSHOT
After Visit Summary   5/8/2017    Kameron Park    MRN: [id number]           Patient Information     Date Of Birth          1965        Visit Information        Provider Department      5/8/2017 10:00 AM Nadine Vallecillo MD [facility]        Today's Diagnoses     Morbid obesity due to excess calories (H)    -  1      Care Instructions    Food goal: 1,500 calorie meal plan using meal replacements to get a 1lbs weekly of weight loss without exercise    Use meal replacements such as Berta's meals, Lean Cuisines, Healthy Choice, Smart Ones, Weight Watchers Meals, and Slim Fast and Glucerna shakes and supplement with fresh fruits and vegetables  Please drink a lot of water daily. Most people typically need about 2 liters of water daily and more if they are exercising, have a large weight, or have a fever or illness. Add Crystal Light for flavoring if desired. But no pop with calories in it.  Please keep a food journal of what you eat, calories in what you eat, and mood and bring the journal with you to your next appointment.  Consider using applications for smart phones such as [employer], Andtix, [employer], LoseIt, Tap&Track, and RelaxM.  Focus on wet volumetrics, meaning, eat more foods that are high in water and fiber such as fruits and vegetables in order to get that full feeling. These are also good for your overall health as well.  Check out Dr. Nini Baird from [facility] - she has cookbooks with low calorie volumetric recipes  You can try Let's Dish to help you prepare meals for you and your family. Often times, the caloric and nutrition data and serving sizes are available for this food. This can be a time saving maneuver. The website can give you more information http://www.Advanced [employer].Retrieve/  C[employer] has Let's Dish & fresh low calorie salads  Check out Hello Fresh at https://www.[employer]l [employer]/food-boxes/classic-box/  Try Cooking Light recipes for  low calorie meal preparation and planning  Other food plan options you can search for on the internet and check out include: Benjie CHOI, [facility]  Please consider health psychologist to discuss how depression and/or anxiety impact your eating.  Progressively increase physical activity to 60 minutes, including combination of cardio & resistance training x 5 days weekly.  Plan to go to the [facility] to get an exercise assessment and recommendation. You can either plan to complete the entire program there or take your new skills to the gym of your choice. If you do not hear from an exercise professional from the program within a week, please call our clinic nurse at [phone].  Consider hiring a  to develop a structured progressive workout plan that includes both cardio and resistance training. Obesity is a disease according to the IRS, so you may be able to use some pre-tax dollars from your medical flexible spending account if you get a letter from your doctor and/or fill out a plan-specific form.    Activity goal: more walking, daily walking    Blood pressure goal: <135/85 to continue or increase phentermine dose    Medication goal: to add phentermine 1/2 tab daily to current regime of topiramate 200 mg at bedtime & to remember to take lisinopril daily, as blood pressure may go up on this new medication            Follow-ups after your visit        Follow-up notes from your care team     Return in about 2 months (around 7/8/2017).      Who to contact     Please call your clinic at [phone] to:    Ask questions about your health    Make or cancel appointments    Discuss your medicines    Learn about your test results    Speak to your doctor   If you have compliments or concerns about an experience at your clinic, or if you wish to file a complaint, please contact [facility] Patient Relations at [phone] or email us at  [email or web address]         Additional Information About Your Visit        Carmahart Information     Carmahart gives you secure access to your electronic health record. If you see a primary care provider, you can also send messages to your care team and make appointments. If you have questions, please call your primary care clinic.  If you do not have a primary care provider, please call [phone] and they will assist you.      Heartscape is an electronic gateway that provides easy, online access to your medical records. With Heartscape, you can request a clinic appointment, read your test results, renew a prescription or communicate with your care team.     To access your existing account, please contact your [facility] or call [phone] for assistance.        Care EveryWhere ID     This is your Care EveryWhere ID. This could be used by other organizations to access your [place] medical records  [id number]        Your Vitals Were     Pulse Temperature Height Pulse Oximetry BMI (Body Mass Index)       81 99  F (37.2  C) (Oral) 5' 4\" 95% 52.23 kg/m2        Blood Pressure from Last 3 Encounters:   05/08/17 138/90   01/21/17 121/83   01/16/17 132/79    Weight from Last 3 Encounters:   05/08/17 (!) 304 lb 4.8 oz   01/21/17 (!) 300 lb 6.4 oz   01/16/17 (!) 300 lb 8 oz              Today, you had the following     No orders found for display         Today's Medication Changes          These changes are accurate as of: 5/8/17 11:34 AM.  If you have any questions, ask your nurse or doctor.               Start taking these medicines.        Dose/Directions    phentermine 37.5 MG tablet   Commonly known as:  ADIPEX-P   Used for:  Morbid obesity due to excess calories (H)   Started by:  Nadine Vallecillo MD        1/2 tab each am   Quantity:  14 tablet   Refills:  3            Where to get your medicines      Some of these will need a paper prescription and others can be bought " over the counter.  Ask your nurse if you have questions.     Bring a paper prescription for each of these medications     phentermine 37.5 MG tablet                Primary Care Provider Office Phone # Fax #    Veena LYNN FILIPE Stafford [phone]       [address] [facility] [address]        Thank you!     Thank you for choosing [facility]  for your care. Our goal is always to provide you with excellent care. Hearing back from our patients is one way we can continue to improve our services. Please take a few minutes to complete the written survey that you may receive in the mail after your visit with us. Thank you!             Your Updated Medication List - Protect others around you: Learn how to safely use, store and throw away your medicines at www.disposemymeds.org.          This list is accurate as of: 5/8/17 11:34 AM.  Always use your most recent med list.                   Brand Name Dispense Instructions for use    acetaminophen 500 MG tablet    TYLENOL     Take 500 mg by mouth       albuterol 108 (90 BASE) MCG/ACT Inhaler    PROAIR HFA/PROVENTIL HFA/VENTOLIN HFA     Inhale 1-2 puffs into the lungs       cetirizine 10 MG tablet    zyrTEC     Take 10 mg by mouth as needed       LISINOPRIL PO      Take 10 mg by mouth daily       meclizine 25 MG Chew      Take 25 mg by mouth 3 times daily       phentermine 37.5 MG tablet    ADIPEX-P    14 tablet    1/2 tab each am       topiramate 100 MG tablet    TOPAMAX    30 tablet    Take 1 tablet (100 mg) by mouth At Bedtime

## 2017-05-24 NOTE — PROGRESS NOTES
"    Return Medical Weight Management Note     Kameron Park  MRN:  [id number]  :  1965  SHOAIB:  2017    Dear Veena Stafford,    I had the pleasure of seeing your patient Kameron Park.  She is a 52 year old female who I am continuing to see for treatment of obesity related to:    Hypertension, Depression, Asthma, and back/knee pain    INTERVAL HISTORY:  Making good lifestyle changes. Cardiology, Dr. Rios, ok'd her for phentermine. I had referred her for a possible RV conduction delay on EKG. Is taking topiramate at hs without much recent good effect on weight. Blood pressure borderline high today.    CURRENT WEIGHT:   304 lbs 4.8 oz    Wt Readings from Last 4 Encounters:   17 (!) 138 kg (304 lb 4.8 oz)   17 (!) 136.3 kg (300 lb 6.4 oz)   17 (!) 136.3 kg (300 lb 8 oz)   16 134.9 kg (297 lb 6.4 oz)       Height:  5' 4\"  Body Mass Index:  Body mass index is 52.23 kg/(m^2).  Vitals:  B/P: 138/90, P: 81    Initial consult weight was 325 lbs on 14.  Weight change since last seen on 2017 is up 4 pounds.   Total loss is 21 pounds.    Diet and Activity Changes Since Last Visit Reviewed With Patient 2017   I have made the following changes to my diet since my last visit: eat less   With regards to my diet, I am still struggling with: my weight   For breakfast, I typically eat: egg   For lunch, I typically eat: -   For supper, I typically eat: chicken rice    For snack(s), I typically eat: -   I have made the following changes to my activity/exercise since my last visit: same thing walking   With regards to my activity/exercise, I am still struggling with: -     ROS    MEDICATIONS:   Current Outpatient Prescriptions   Medication     phentermine (ADIPEX-P) 37.5 MG tablet     acetaminophen (TYLENOL) 500 MG tablet     albuterol (PROAIR HFA/PROVENTIL HFA/VENTOLIN HFA) 108 (90 BASE) MCG/ACT Inhaler     topiramate (TOPAMAX) 100 MG tablet     cetirizine (ZYRTEC) 10 MG tablet     " meclizine 25 MG CHEW     LISINOPRIL PO     No current facility-administered medications for this visit.        Weight Loss Medication History Reviewed With Patient 5/8/2017   Which weight loss medications are you currently taking on a regular basis?  Topamax (topiramate)   Are you having any side effects from the weight loss medication that we have prescribed you? No     ASSESSMENT:   Morbid obesity, improving, weight gain on topiramate. Need for appetite suppressant.    PLAN:   Food goal: 1,500 calorie meal plan using meal replacements to get a 1lbs weekly of weight loss without exercise     Use meal replacements such as Berta's meals, Lean Cuisines, Healthy Choice, Smart Ones, Weight Watchers Meals, and Slim Fast and Glucerna shakes and supplement with fresh fruits and vegetables  Please drink a lot of water daily. Most people typically need about 2 liters of water daily and more if they are exercising, have a large weight, or have a fever or illness. Add Crystal Light for flavoring if desired. But no pop with calories in it.  Please keep a food journal of what you eat, calories in what you eat, and mood and bring the journal with you to your next appointment.  Consider using applications for smart phones such as Cactus, [place], [employer], LoseIt, Tap&Track, and RelaxM.  Focus on wet volumetrics, meaning, eat more foods that are high in water and fiber such as fruits and vegetables in order to get that full feeling. These are also good for your overall health as well.  Check out Dr. Nini Baidr from [facility] - she has cookbooks with low calorie volumetric recipes  You can try Let's Dish to help you prepare meals for you and your family. Often times, the caloric and nutrition data and serving sizes are available for this food. This can be a time saving maneuver. The website can give you more information http://www.[employer].Bioapter/  Hermelindo'mecca Broderick has Let's Dish & fresh low calorie salads  Check out  Inbox at https://www.[employer]/food-boxes/classic-box/  Try Cooking Light recipes for low calorie meal preparation and planning  Other food plan options you can search for on the internet and check out include: Benjie CHOI, [facility]  Please consider health psychologist to discuss how depression and/or anxiety impact your eating.  Progressively increase physical activity to 60 minutes, including combination of cardio & resistance training x 5 days weekly.  Plan to go to the [place] [facility] to get an exercise assessment and recommendation. You can either plan to complete the entire program there or take your new skills to the gym of your choice. If you do not hear from an exercise professional from the program within a week, please call our clinic nurse at [phone].  Consider hiring a  to develop a structured progressive workout plan that includes both cardio and resistance training. Obesity is a disease according to the IRS, so you may be able to use some pre-tax dollars from your medical flexible spending account if you get a letter from your doctor and/or fill out a plan-specific form.     Activity goal: more walking, daily walking     Blood pressure goal: <135/85 to continue or increase phentermine dose      Medication goal: to add phentermine 1/2 tab daily to current regime of topiramate 200 mg at bedtime & to remember to take lisinopril daily, as blood pressure may go up on this new medication    FOLLOW-UP:    8 weeks.    Time: 30 min spent on evaluation, management, counseling, education, & motivational interviewing with greater than 50% of the total time was spent on counseling and coordinating care    Sincerely,    Nadine Vallecillo MD

## 2017-06-09 ENCOUNTER — TELEPHONE (OUTPATIENT)
Dept: ENDOCRINOLOGY | Facility: CLINIC | Age: 52
End: 2017-06-09

## 2017-06-12 NOTE — TELEPHONE ENCOUNTER
[facility] Prior Authorization Team   Phone: [phone]  Fax: [phone]      PA Initiation    Medication: Phentermine   Insurance Company:    Pharmacy Filling the Rx: [facility][id number] - [address]  Filling Pharmacy Phone: [phone]  Filling Pharmacy Fax: [phone]  Start Date: 6/12/2017

## 2017-08-15 ENCOUNTER — ALLIED HEALTH/NURSE VISIT (OUTPATIENT)
Dept: SURGERY | Facility: CLINIC | Age: 52
End: 2017-08-15

## 2017-08-15 VITALS
BODY MASS INDEX: 50.02 KG/M2 | HEIGHT: 64 IN | SYSTOLIC BLOOD PRESSURE: 167 MMHG | OXYGEN SATURATION: 100 % | WEIGHT: 293 LBS | HEART RATE: 83 BPM | DIASTOLIC BLOOD PRESSURE: 109 MMHG

## 2017-08-15 DIAGNOSIS — I10 BENIGN ESSENTIAL HYPERTENSION: Primary | ICD-10-CM

## 2017-08-15 DIAGNOSIS — E83.51 HYPOCALCEMIA: ICD-10-CM

## 2017-08-15 DIAGNOSIS — I10 BENIGN ESSENTIAL HYPERTENSION: ICD-10-CM

## 2017-08-15 DIAGNOSIS — I15.8 OTHER SECONDARY HYPERTENSION: Primary | ICD-10-CM

## 2017-08-15 LAB
ANION GAP SERPL CALCULATED.3IONS-SCNC: 7 MMOL/L (ref 3–14)
BUN SERPL-MCNC: 10 MG/DL (ref 7–30)
CALCIUM SERPL-MCNC: 8.4 MG/DL (ref 8.5–10.1)
CHLORIDE SERPL-SCNC: 109 MMOL/L (ref 94–109)
CO2 SERPL-SCNC: 23 MMOL/L (ref 20–32)
CREAT SERPL-MCNC: 0.84 MG/DL (ref 0.52–1.04)
GFR SERPL CREATININE-BSD FRML MDRD: 71 ML/MIN/1.7M2
GLUCOSE SERPL-MCNC: 86 MG/DL (ref 70–99)
POTASSIUM SERPL-SCNC: 4.3 MMOL/L (ref 3.4–5.3)
SODIUM SERPL-SCNC: 140 MMOL/L (ref 133–144)

## 2017-08-15 NOTE — MR AVS SNAPSHOT
After Visit Summary   8/15/2017    Kameron Park    MRN: [id number]           Patient Information     Date Of Birth          1965        Visit Information        Provider Department      8/15/2017 12:00 PM Nurse,  Surgery General Surgery        Today's Diagnoses     Other secondary hypertension    -  1       Follow-ups after your visit        Your next 10 appointments already scheduled     Oct 31, 2017  1:00 PM CDT   (Arrive by 12:45 PM)   Return Visit with Nadine Vallecillo MD   [facility] ([facility])    [address] [place] [id number] [phone]              Who to contact     Please call your clinic at [phone] to:    Ask questions about your health    Make or cancel appointments    Discuss your medicines    Learn about your test results    Speak to your doctor   If you have compliments or concerns about an experience at your clinic, or if you wish to file a complaint, please contact [facility] Patient Relations at [phone] or email us at [email or web address]         Additional Information About Your Visit        MyChart Information     Crowdasaurus gives you secure access to your electronic health record. If you see a primary care provider, you can also send messages to your care team and make appointments. If you have questions, please call your primary care clinic.  If you do not have a primary care provider, please call [phone] and they will assist you.      Crowdasaurus is an electronic gateway that provides easy, online access to your medical records. With Crowdasaurus, you can request a clinic appointment, read your test results, renew a prescription or communicate with your care team.     To access your existing account, please contact your [facility] or call [phone] for assistance.        Care EveryWhere ID     This is your Care  "EveryWhere ID. This could be used by other organizations to access your [place] medical records  [id number]        Your Vitals Were     Pulse Height Pulse Oximetry BMI (Body Mass Index)          83 1.626 m (5' 4\") 100% 52.01 kg/m2         Blood Pressure from Last 3 Encounters:   08/15/17 (!) 167/109   05/08/17 138/90   01/21/17 121/83    Weight from Last 3 Encounters:   08/15/17 (!) 137.4 kg (303 lb)   05/08/17 (!) 138 kg (304 lb 4.8 oz)   01/21/17 (!) 136.3 kg (300 lb 6.4 oz)              Today, you had the following     No orders found for display       Primary Care Provider Office Phone # Fax #    Veena Stafford [phone]       [place] [address]        Equal Access to Services     [facility] : Hadii aad ku hadasho Solyndaali, waaxda luqadaha, qaybta kaalmada adeegyada, keny hayes . So [facility] [phone].    ATENCIÓN: Si paty alejandre, tiene a mullen disposición servicios gratuitos de asistencia lingüística. Llame al [phone].    We comply with applicable federal civil rights laws and [place] laws. We do not discriminate on the basis of race, color, national origin, age, disability sex, sexual orientation or gender identity.            Thank you!     Thank you for choosing [facility]AL SUR[facility]ERY  for your care. Our goal is always to provide you with excellent care. Hearing back from our patients is one way we can continue to improve our services. Please take a few minutes to complete the written survey that you may receive in the mail after your visit with us. Thank you!             Your Updated Medication List - Protect others around you: Learn how to safely use, store and throw away your medicines at www.disposemymeds.org.          This list is accurate as of: 8/15/17 11:59 PM.  Always use your most recent med list.                   Brand Name Dispense Instructions for use Diagnosis    acetaminophen 500 MG tablet    " TYLENOL     Take 500 mg by mouth        albuterol 108 (90 BASE) MCG/ACT Inhaler    PROAIR HFA/PROVENTIL HFA/VENTOLIN HFA     Inhale 1-2 puffs into the lungs        cetirizine 10 MG tablet    zyrTEC     Take 10 mg by mouth as needed        LISINOPRIL PO      Take 10 mg by mouth daily        meclizine 25 MG Chew      Take 25 mg by mouth 3 times daily        phentermine 37.5 MG tablet    ADIPEX-P    14 tablet    1/2 tab each am    Morbid obesity due to excess calories (H)       topiramate 100 MG tablet    TOPAMAX    30 tablet    Take 1 tablet (100 mg) by mouth At Bedtime    Eating disorder

## 2017-08-15 NOTE — NURSING NOTE
"Kameron Park is a 52 year old female who comes in today for a Blood Pressure check because of Medications of phentermine and topamax    Chief Complaint   Patient presents with     RECHECK     B/P       Vitals:    08/15/17 1200   BP: (!) 167/109   Pulse: 83   SpO2: 100%   Weight: (!) 137.4 kg (303 lb)   Height: 1.626 m (5' 4\")          Vitals as recorded, a x-large cuff was used.    Patient is taking medication as prescribed  Patient is tolerating medications well.  Patient is monitoring Blood Pressure at home but states she does not know how to read it  And states  it is\" 134 \"  Requested that you bring to next visit so we can instruct on how to use it.  Current complaints: none    Disposition: follow-up as indicated by MD/AP  "

## 2017-09-03 ENCOUNTER — HEALTH MAINTENANCE LETTER (OUTPATIENT)
Age: 52
End: 2017-09-03

## 2017-10-06 DIAGNOSIS — F50.9 EATING DISORDER: ICD-10-CM

## 2017-10-06 RX ORDER — TOPIRAMATE 100 MG/1
100 TABLET, FILM COATED ORAL AT BEDTIME
Qty: 26 TABLET | Refills: 0 | Status: SHIPPED | OUTPATIENT
Start: 2017-10-06 | End: 2017-10-31

## 2017-10-06 NOTE — TELEPHONE ENCOUNTER
topiramate (TOPAMAX) 100 MG tablet  Last Written Prescription Date:  1/16/17  Last Fill Quantity: 30,   # refills: 3  Last Office Visit with [facility], [facility] or [facility] prescribing provider: 5/8/17  Future Office visit:   10/31/17

## 2017-10-31 ENCOUNTER — OFFICE VISIT (OUTPATIENT)
Dept: ENDOCRINOLOGY | Facility: CLINIC | Age: 52
End: 2017-10-31

## 2017-10-31 VITALS
BODY MASS INDEX: 50.02 KG/M2 | HEART RATE: 95 BPM | TEMPERATURE: 98.2 F | WEIGHT: 293 LBS | DIASTOLIC BLOOD PRESSURE: 90 MMHG | SYSTOLIC BLOOD PRESSURE: 146 MMHG | HEIGHT: 64 IN | OXYGEN SATURATION: 100 %

## 2017-10-31 DIAGNOSIS — F50.9 EATING DISORDER: ICD-10-CM

## 2017-10-31 DIAGNOSIS — I10 BENIGN ESSENTIAL HYPERTENSION: ICD-10-CM

## 2017-10-31 DIAGNOSIS — E66.01 MORBID OBESITY (H): Primary | ICD-10-CM

## 2017-10-31 RX ORDER — TOPIRAMATE 100 MG/1
100 TABLET, FILM COATED ORAL AT BEDTIME
Qty: 30 TABLET | Refills: 11 | Status: SHIPPED | OUTPATIENT
Start: 2017-10-31 | End: 2022-01-11

## 2017-10-31 ASSESSMENT — PAIN SCALES - GENERAL: PAINLEVEL: NO PAIN (0)

## 2017-10-31 NOTE — NURSING NOTE
"Chief Complaint   Patient presents with     RECHECK     RMWM       Vitals:    10/31/17 1225   BP: 146/90   BP Location: Left arm   Patient Position: Chair   Cuff Size: Adult Regular   Pulse: 95   Temp: 98.2  F (36.8  C)   SpO2: 100%   Weight: 297 lb 6.4 oz   Height: 5' 4\"       Body mass index is 51.05 kg/(m^2).      Kimberly Mcmahon                          "

## 2017-10-31 NOTE — MR AVS SNAPSHOT
After Visit Summary   10/31/2017    Kameron Park    MRN: [id number]           Patient Information     Date Of Birth          1965        Visit Information        Provider Department      10/31/2017 1:00 PM Nadine Vallecillo MD [facility]        Today's Diagnoses     Morbid obesity (H)    -  1    Eating disorder        Benign essential hypertension          Care Instructions    Food goal: 1,500 calorie food plan using meal replacements (see below) & nutrition referral was put in for you today    Activity goal: to increase walking to 60 minutes daily    Medication goal: to continue topiramate 100 mg at bedtime, can restart phentermine after blood pressure is <140/90 (please keep your appointment next week for blood pressure check up and management)    Healthy Tips:  Use meal replacements such as Berta's meals, Lean Cuisines, Healthy Choice, Smart Ones, Weight Watchers Meals, and Slim Fast and Glucerna shakes and supplement with fresh fruits and vegetables    Please drink a lot of water daily. Most people typically need about 2 liters of water daily and more if they are exercising, have a large weight, or have a fever or illness. Add Crystal Light for flavoring if desired. But no pop with calories in it.  Please keep a food journal of what you eat, calories in what you eat, and mood and bring the journal with you to your next appointment.    Consider using applications for smart phones such as MoveanessPal, LifeContactually, H[employer] BeveragesRecipes, LoseIt, Tap&Track, and RelaxM.    Focus on wet volumetrics, meaning, eat more foods that are high in water and fiber such as fruits and vegetables in order to get that full feeling. These are also good for your overall health as well.    Cobmanisha[employer] Meggan has Let's Dish & fresh low calorie salads  Try Cooking Light recipes for low calorie meal preparation and planning    Progressively increase physical activity to 60 minutes, including  combination of cardio & resistance training x 5 days weekly.              Follow-ups after your visit        Additional Services     NUTRITION REFERRAL       Your provider has referred you to: [facility]: [facility] (on call location)  - [place] [phone]   [email or web address]    Please be aware that coverage of these services is subject to the terms and limitations of your health insurance plan.  Call member services at your health plan with any benefit or coverage questions.      Please bring the following with you to your appointment:    (1) This referral request  (2) Any documents given to you regarding this referral  (3) Any specific questions you have about diet and/or food choices                  Follow-up notes from your care team     Return in about 3 months (around 1/31/2018).      Who to contact     Please call your clinic at [phone] to:    Ask questions about your health    Make or cancel appointments    Discuss your medicines    Learn about your test results    Speak to your doctor   If you have compliments or concerns about an experience at your clinic, or if you wish to file a complaint, please contact [facility] Patient Relations at [phone] or email us at [email or web address]         Additional Information About Your Visit        TrackwayharTrackingPoint Information     [employer] gives you secure access to your electronic health record. If you see a primary care provider, you can also send messages to your care team and make appointments. If you have questions, please call your primary care clinic.  If you do not have a primary care provider, please call [phone] and they will assist you.      [employer] is an electronic gateway that provides easy, online access to your medical records. With [employer], you can request a clinic appointment, read your test results, renew a prescription or communicate with your care team.     To  "access your existing account, please contact your [facility] or call [phone] for assistance.        Care EveryWhere ID     This is your Care EveryWhere ID. This could be used by other organizations to access your [place] medical records  [id number]        Your Vitals Were     Pulse Temperature Height Pulse Oximetry BMI (Body Mass Index)       95 98.2  F (36.8  C) 1.626 m (5' 4\") 100% 51.05 kg/m2        Blood Pressure from Last 3 Encounters:   10/31/17 146/90   08/15/17 (!) 167/109   05/08/17 138/90    Weight from Last 3 Encounters:   10/31/17 134.9 kg (297 lb 6.4 oz)   08/15/17 (!) 137.4 kg (303 lb)   05/08/17 (!) 138 kg (304 lb 4.8 oz)              We Performed the Following     NUTRITION REFERRAL          Where to get your medicines      These medications were sent to [facility] [address] [address]     Phone:  [phone]     topiramate 100 MG tablet          Primary Care Provider Office Phone # Fax #    Veena Stafford [phone]       [address] [address]        Equal Access to Services     DANIAL HAYES : Hadii manuel vargas hadasho Soomaali, waaxda luqadaha, qaybta kaalmada adeegyada, keny jones. So [facility] [phone].    ATENCIÓN: Si habla español, tiene a mullen disposición servicios gratuitos de asistencia lingüística. Llame al [phone].    We comply with applicable federal civil rights laws and [place] laws. We do not discriminate on the basis of race, color, national origin, age, disability, sex, sexual orientation, or gender identity.            Thank you!     Thank you for choosing [facility]  for your care. Our goal is always to provide you with excellent care. Hearing back from our patients is one way we can continue to improve our services. Please take a few minutes to " complete the written survey that you may receive in the mail after your visit with us. Thank you!             Your Updated Medication List - Protect others around you: Learn how to safely use, store and throw away your medicines at www.disposemymeds.org.          This list is accurate as of: 10/31/17  1:34 PM.  Always use your most recent med list.                   Brand Name Dispense Instructions for use Diagnosis    acetaminophen 500 MG tablet    TYLENOL     Take 500 mg by mouth        albuterol 108 (90 BASE) MCG/ACT Inhaler    PROAIR HFA/PROVENTIL HFA/VENTOLIN HFA     Inhale 1-2 puffs into the lungs        cetirizine 10 MG tablet    zyrTEC     Take 10 mg by mouth as needed        LISINOPRIL PO      Take 10 mg by mouth daily        meclizine 25 MG Chew      Take 25 mg by mouth 3 times daily        phentermine 37.5 MG tablet    ADIPEX-P    14 tablet    1/2 tab each am    Morbid obesity due to excess calories (H)       topiramate 100 MG tablet    TOPAMAX    30 tablet    Take 1 tablet (100 mg) by mouth At Bedtime    Eating disorder

## 2017-10-31 NOTE — LETTER
"10/31/2017       RE: Kameron Park  [address]  [place] [facility] [address]     Dear Colleague,    Thank you for referring your patient, Kameron Park, to the [facility] at [facility]. Please see a copy of my visit note below.        Return Medical Weight Management Note     Kameron Park  MRN:  [id number]  :  1965  SHOAIB:  10/31/2017    Dear Veena Stafford,    I had the pleasure of seeing your patient Kameron Park.  She is a 52 year old female who I am continuing to see for treatment of obesity related to:    Hypertension, Depression, Asthma, and back/knee pain    INTERVAL HISTORY:  Has been struggling to purchase healthy foods due to budget issues. Has been taking topiramate and is not having side effects. Blood pressure is up today; she is scheduled to see her primary care physician next week for this. Has been doing more walking since her car is broke.    CURRENT WEIGHT:   297 lbs 6.4 oz    Wt Readings from Last 4 Encounters:   10/31/17 134.9 kg (297 lb 6.4 oz)   08/15/17 (!) 137.4 kg (303 lb)   17 (!) 138 kg (304 lb 4.8 oz)   17 (!) 136.3 kg (300 lb 6.4 oz)     Height:  5' 4\"  Body Mass Index:  Body mass index is 51.05 kg/(m^2).  Vitals:  B/P: 146/90, P: 95    Initial consult weight was 325 on 14.  Weight change since last seen on 8/15/2017 is down 7 pounds.   Total loss is 28 pounds.    Diet and Activity Changes Since Last Visit Reviewed With Patient 10/31/2017   I have made the following changes to my diet since my last visit: eatting   With regards to my diet, I am still struggling with: weight   For breakfast, I typically eat: nothing   For lunch, I typically eat: whatever i can   For supper, I typically eat: whatever i can   For snack(s), I typically eat: chips made be cookies   I have made the following changes to my activity/exercise since my last visit: nothing   With regards to my activity/exercise, I am " still struggling with: nothing     ROS    MEDICATIONS:   Current Outpatient Prescriptions   Medication     topiramate (TOPAMAX) 100 MG tablet     phentermine (ADIPEX-P) 37.5 MG tablet     acetaminophen (TYLENOL) 500 MG tablet     albuterol (PROAIR HFA/PROVENTIL HFA/VENTOLIN HFA) 108 (90 BASE) MCG/ACT Inhaler     cetirizine (ZYRTEC) 10 MG tablet     meclizine 25 MG CHEW     LISINOPRIL PO     No current facility-administered medications for this visit.        Weight Loss Medication History Reviewed With Patient 10/31/2017   Which weight loss medications are you currently taking on a regular basis?  Phentermine, Topamax (topiramate)   Are you having any side effects from the weight loss medication that we have prescribed you? No       ASSESSMENT:   Morbid Obesity without significant change in weight with some socioeconomic barriers to getting healthy foods    PLAN:   Food goal: 1,500 calorie food plan using meal replacements (see below) & nutrition referral was put in for you today    Activity goal: to increase walking to 60 minutes daily    Medication goal: to continue topiramate 100 mg at bedtime, can restart phentermine after blood pressure is <140/90 (please keep your appointment next week for blood pressure check up and management)    Healthy Tips:  Use meal replacements such as Berta's meals, Lean Cuisines, Healthy Choice, Smart Ones, Weight Watchers Meals, and Slim Fast and Glucerna shakes and supplement with fresh fruits and vegetables    Please drink a lot of water daily. Most people typically need about 2 liters of water daily and more if they are exercising, have a large weight, or have a fever or illness. Add Crystal Light for flavoring if desired. But no pop with calories in it.  Please keep a food journal of what you eat, calories in what you eat, and mood and bring the journal with you to your next appointment.    Consider using applications for smart phones such as Fixmo, SameDayPrinting.com, [employer],  LoseIt, Tap&Track, and RelaxM.    Focus on wet volumetrics, meaning, eat more foods that are high in water and fiber such as fruits and vegetables in order to get that full feeling. These are also good for your overall health as well.    Coborn[employer] Delivers has Let's Dish & fresh low calorie salads  Try Cooking Light recipes for low calorie meal preparation and planning    Progressively increase physical activity to 60 minutes, including combination of cardio & resistance training x 5 days weekly.    FOLLOW-UP:    12 weeks.    Time: 25 min spent on evaluation, management, counseling, education, & motivational interviewing with greater than 50% of the total time was spent on counseling and coordinating care    Sincerely,    Nadine Vallecillo MD

## 2017-10-31 NOTE — PROGRESS NOTES
"    Return Medical Weight Management Note     Kameron Park  MRN:  [id number]  :  1965  SHOAIB:  10/31/2017    Dear Veena Stafford,    I had the pleasure of seeing your patient Kameron Park.  She is a 52 year old female who I am continuing to see for treatment of obesity related to:    Hypertension, Depression, Asthma, and back/knee pain    INTERVAL HISTORY:  Has been struggling to purchase healthy foods due to budget issues. Has been taking topiramate and is not having side effects. Blood pressure is up today; she is scheduled to see her primary care physician next week for this. Has been doing more walking since her car is broke.    CURRENT WEIGHT:   297 lbs 6.4 oz    Wt Readings from Last 4 Encounters:   10/31/17 134.9 kg (297 lb 6.4 oz)   08/15/17 (!) 137.4 kg (303 lb)   17 (!) 138 kg (304 lb 4.8 oz)   17 (!) 136.3 kg (300 lb 6.4 oz)     Height:  5' 4\"  Body Mass Index:  Body mass index is 51.05 kg/(m^2).  Vitals:  B/P: 146/90, P: 95    Initial consult weight was 325 on 14.  Weight change since last seen on 8/15/2017 is down 7 pounds.   Total loss is 28 pounds.    Diet and Activity Changes Since Last Visit Reviewed With Patient 10/31/2017   I have made the following changes to my diet since my last visit: eatting   With regards to my diet, I am still struggling with: weight   For breakfast, I typically eat: nothing   For lunch, I typically eat: whatever i can   For supper, I typically eat: whatever i can   For snack(s), I typically eat: chips made be cookies   I have made the following changes to my activity/exercise since my last visit: nothing   With regards to my activity/exercise, I am still struggling with: nothing     ROS    MEDICATIONS:   Current Outpatient Prescriptions   Medication     topiramate (TOPAMAX) 100 MG tablet     phentermine (ADIPEX-P) 37.5 MG tablet     acetaminophen (TYLENOL) 500 MG tablet     albuterol (PROAIR HFA/PROVENTIL HFA/VENTOLIN HFA) 108 (90 BASE) MCG/ACT " Inhaler     cetirizine (ZYRTEC) 10 MG tablet     meclizine 25 MG CHEW     LISINOPRIL PO     No current facility-administered medications for this visit.        Weight Loss Medication History Reviewed With Patient 10/31/2017   Which weight loss medications are you currently taking on a regular basis?  Phentermine, Topamax (topiramate)   Are you having any side effects from the weight loss medication that we have prescribed you? No       ASSESSMENT:   Morbid Obesity without significant change in weight with some socioeconomic barriers to getting healthy foods    PLAN:   Food goal: 1,500 calorie food plan using meal replacements (see below) & nutrition referral was put in for you today    Activity goal: to increase walking to 60 minutes daily    Medication goal: to continue topiramate 100 mg at bedtime, can restart phentermine after blood pressure is <140/90 (please keep your appointment next week for blood pressure check up and management)    Healthy Tips:  Use meal replacements such as Berta's meals, Lean Cuisines, Healthy Choice, Smart Ones, Weight Watchers Meals, and Slim Fast and Glucerna shakes and supplement with fresh fruits and vegetables    Please drink a lot of water daily. Most people typically need about 2 liters of water daily and more if they are exercising, have a large weight, or have a fever or illness. Add Crystal Light for flavoring if desired. But no pop with calories in it.  Please keep a food journal of what you eat, calories in what you eat, and mood and bring the journal with you to your next appointment.    Consider using applications for smart phones such as Occipital, [employer], Monitor My MedsRecipes, LoseIt, Tap&Track, and RelaxM.    Focus on wet volumetrics, meaning, eat more foods that are high in water and fiber such as fruits and vegetables in order to get that full feeling. These are also good for your overall health as well.    Cob[employer] Delivers has Let's Dish & fresh low calorie  salads  Try Cooking Light recipes for low calorie meal preparation and planning    Progressively increase physical activity to 60 minutes, including combination of cardio & resistance training x 5 days weekly.    FOLLOW-UP:    12 weeks.    Time: 25 min spent on evaluation, management, counseling, education, & motivational interviewing with greater than 50% of the total time was spent on counseling and coordinating care    Sincerely,    Nadine Vallecillo MD

## 2017-10-31 NOTE — PATIENT INSTRUCTIONS
Food goal: 1,500 calorie food plan using meal replacements (see below) & nutrition referral was put in for you today    Activity goal: to increase walking to 60 minutes daily    Medication goal: to continue topiramate 100 mg at bedtime, can restart phentermine after blood pressure is <140/90 (please keep your appointment next week for blood pressure check up and management)    Healthy Tips:  Use meal replacements such as Berta's meals, Lean Cuisines, Healthy Choice, Smart Ones, Weight Watchers Meals, and Slim Fast and Glucerna shakes and supplement with fresh fruits and vegetables    Please drink a lot of water daily. Most people typically need about 2 liters of water daily and more if they are exercising, have a large weight, or have a fever or illness. Add Crystal Light for flavoring if desired. But no pop with calories in it.  Please keep a food journal of what you eat, calories in what you eat, and mood and bring the journal with you to your next appointment.    Consider using applications for smart phones such as [employer]le [employer], MatchMine, OmateReciKeepGo, LoseIt, Tap&Track, and RelaxM.    Focus on wet volumetrics, meaning, eat more foods that are high in water and fiber such as fruits and vegetables in order to get that full feeling. These are also good for your overall health as well.    Hermelindo's Delivers has Let's Dish & fresh low calorie salads  Try Cooking Light recipes for low calorie meal preparation and planning    Progressively increase physical activity to 60 minutes, including combination of cardio & resistance training x 5 days weekly.

## 2017-11-08 ENCOUNTER — ALLIED HEALTH/NURSE VISIT (OUTPATIENT)
Dept: SURGERY | Facility: CLINIC | Age: 52
End: 2017-11-08

## 2017-11-08 NOTE — PATIENT INSTRUCTIONS
Nutrition Goals  1) Follow 1500 Calorie/day diet, focusing on lean protein and non-starchy vegetables/whole fruit at 3 meals/day.   -You may utilize Lean Cuisine/Healthy Choice/Smart Ones frozen meals + additional non-starchy vegetables (string beans, broccoli, cauliflower, carrots, celery, bell peppers, etc).  2) Record food intake prior to eating throughout the day. (MyFitnessPal or Sparkpeople or Loseit).  3) Practice mindful eating (eat slowly, taking 20-30 min at a meal; chewing well).

## 2017-11-08 NOTE — MR AVS SNAPSHOT
MRN:[id number]                      After Visit Summary   11/8/2017    Kameron Park    MRN: [id number]           Visit Information        Provider Department      11/8/2017 2:00 PM Gema Diaz RD [facility]        Your next 10 appointments already scheduled     Nov 08, 2017  2:00 PM CST   (Arrive by 1:45 PM)   NUTRITION VISIT with JOSEPH Nunes [facility] ([facility])    [address] [place] [id number]   [phone]            Feb 06, 2018 12:40 PM CST   (Arrive by 12:25 PM)   Return Visit with Nadine Vallecillo MD   [facility] ([facility])    [address] [place] [id number]   [phone]              Care Instructions    Nutrition Goals  1) Follow 1500 Calorie/day diet, focusing on lean protein and non-starchy vegetables/whole fruit at 3 meals/day.   -You may utilize Lean Cuisine/Healthy Choice/Smart Ones frozen meals + additional non-starchy vegetables (string beans, broccoli, cauliflower, carrots, celery, bell peppers, etc).  2) Record food intake prior to eating throughout the day. (MyFitnessPal or Sparkpeople or Loseit).  3) Practice mindful eating (eat slowly, taking 20-30 min at a meal; chewing well).          VeriTran Information     [employer] gives you secure access to your electronic health record. If you see a primary care provider, you can also send messages to your care team and make appointments. If you have questions, please call your primary care clinic.  If you do not have a primary care provider, please call [phone] and they will assist you.      VeriTran is an electronic gateway that provides easy, online access to your medical records. With [employer], you can request a clinic appointment, read your test results, renew a prescription or communicate with your care team.      To access your existing account, please contact your [facility] or call [phone] for assistance.        Care EveryWhere ID     This is your Care EveryWhere ID. This could be used by other organizations to access your [place] medical records  [id number]        Equal Access to Services     DANIAL HAYES : Vito Maria, shelbi buchanan, rakan kaalmastar aguero, keny jones. So [facility] [phone].    ATENCIÓN: Si habla español, tiene a mullen disposición servicios gratuitos de asistencia lingüística. Llame al [phone].    We comply with applicable federal civil rights laws and [place] laws. We do not discriminate on the basis of race, color, national origin, age, disability, sex, sexual orientation, or gender identity.

## 2017-11-08 NOTE — PROGRESS NOTES
"Kameron Park is a 52 year-old female presents today for new weight management nutrition consultation.  Pt was referred by Dr. Vallecillo (10/31/17).   Anthropometrics    Height as of 10/31/17: 1.626 m (5' 4\").    Initial Weight as of 10/31/17: 134.9 kg (297 lb 6.4 oz) with BMI of 51.05.  Current weight: 295.1 lbs (-2.3 lbs from initial weight)    Supplementation: MVI daily (recently start)     Nutrition history  See MD note for details.      Nutrition Prescription  1500 Calories/day (per MD)    Nutrition Diagnosis  Obesity related to history of excessive energy intake as evidenced by BMI > 30.     Nutrition Intervention  Materials/education provided on 1500 Calorie/day diet with portion control and healthy food choices (Plate Method, Volumetrics, and Mindful Eating handouts), 100 calorie snack choices, meal and snack planning and websites, sample meal plans    Patient Understanding: good  Expected Compliance: good    Nutrition Goals  1) Follow 1500 Calorie/day diet, focusing on lean protein and non-starchy vegetables/whole fruit at 3 meals/day.   -You may utilize Lean Cuisine/Healthy Choice/Smart Ones frozen meals + additional non-starchy vegetables (string beans, broccoli, cauliflower, carrots, celery, bell peppers, etc).  2) Record food intake prior to eating throughout the day. (MyFitnessPal or Sparkpeople or Loseit).  3) Practice mindful eating (eat slowly, taking 20-30 min at a meal; chewing well).     Follow-Up: PRN    Time spent with patient: 30 minutes.  Gema Diaz, MS, RDN, LDN, CLT  Pager: [phone]      "

## 2017-11-22 DIAGNOSIS — E66.01 MORBID OBESITY DUE TO EXCESS CALORIES (H): ICD-10-CM

## 2017-11-27 NOTE — TELEPHONE ENCOUNTER
Last Written Prescription Date:  5/8/17  Last Fill Quantity: 14,   # refills: 3  Last Office Visit : 10/31/17  Future Office visit:  2/6/18

## 2017-11-28 RX ORDER — PHENTERMINE HYDROCHLORIDE 37.5 MG/1
18.75 TABLET ORAL EVERY MORNING
Qty: 15 TABLET | Refills: 2 | OUTPATIENT
Start: 2017-11-28

## 2018-02-06 ENCOUNTER — OFFICE VISIT (OUTPATIENT)
Dept: ENDOCRINOLOGY | Facility: CLINIC | Age: 53
End: 2018-02-06
Payer: COMMERCIAL

## 2018-02-06 VITALS
HEART RATE: 62 BPM | BODY MASS INDEX: 48.45 KG/M2 | OXYGEN SATURATION: 100 % | WEIGHT: 283.8 LBS | SYSTOLIC BLOOD PRESSURE: 145 MMHG | HEIGHT: 64 IN | DIASTOLIC BLOOD PRESSURE: 84 MMHG

## 2018-02-06 DIAGNOSIS — E66.01 MORBID OBESITY (H): Primary | ICD-10-CM

## 2018-02-06 RX ORDER — ERGOCALCIFEROL 1.25 MG/1
50000 CAPSULE, LIQUID FILLED ORAL
COMMUNITY
Start: 2017-12-19 | End: 2023-08-17

## 2018-02-06 RX ORDER — BUPROPION HYDROCHLORIDE 150 MG/1
150 TABLET ORAL
COMMUNITY
Start: 2018-01-29 | End: 2023-02-08

## 2018-02-06 RX ORDER — ATORVASTATIN CALCIUM 10 MG/1
10 TABLET, FILM COATED ORAL
COMMUNITY
Start: 2017-12-18 | End: 2018-02-06

## 2018-02-06 NOTE — LETTER
Patient:  Kameron Park  :   1965  MRN:     [id number]        Ms.Charme CINTIA Park  [address]  [facility] [place] [facility] [address]        2018    Dear Vincent Thomason,    My patient is running late because our clinic today ran over. Please excuse her absence.    Respectfully,    JULIO LIRA MD, MPH  Staff Endocrinologist

## 2018-02-06 NOTE — PATIENT INSTRUCTIONS
Food goal: 1,500 calorie food plan using meal replacements (see below) & nutrition referral was put in for you today     Activity goal: to increase walking to 60 minutes daily     Medication goal: to continue topiramate 100 mg at bedtime    Psychological health goal: to continue intensive therapy sessions [place] [facility] [place] team

## 2018-02-06 NOTE — MR AVS SNAPSHOT
After Visit Summary   2/6/2018    Kameron Park    MRN: [id number]           Patient Information     Date Of Birth          1965        Visit Information        Provider Department      2/6/2018 12:40 PM Nadine Vallecillo MD [facility] Medical Weight Management        Care Instructions      Food goal: 1,500 calorie food plan using meal replacements (see below) & nutrition referral was put in for you today     Activity goal: to increase walking to 60 minutes daily     Medication goal: to continue topiramate 100 mg at bedtime    Psychological health goal: to continue intensive therapy sessions at [facility] with the team          Follow-ups after your visit        Follow-up notes from your care team     Return in about 4 months (around 6/6/2018).      Who to contact     Please call your clinic at [phone] to:    Ask questions about your health    Make or cancel appointments    Discuss your medicines    Learn about your test results    Speak to your doctor   If you have compliments or concerns about an experience at your clinic, or if you wish to file a complaint, please contact [facility] Patient Relations at [phone] or email us at [email or web address]         Additional Information About Your Visit        MyChart Information     [employer] gives you secure access to your electronic health record. If you see a primary care provider, you can also send messages to your care team and make appointments. If you have questions, please call your primary care clinic.  If you do not have a primary care provider, please call [phone] and they will assist you.      [employer] is an electronic gateway that provides easy, online access to your medical records. With [employer], you can request a clinic appointment, read your test results, renew a prescription or communicate with your care team.     To access your existing account, please contact your [facility] or call [phone] for assistance.        Care EveryWhere ID     This is your Care EveryWhere ID. This could be used by other organizations to access your [place] medical records  [id number]        Your Vitals Were     Pulse Height Pulse Oximetry BMI (Body Mass Index)          62 1.626 m (5' 4\") 100% 48.71 kg/m2         Blood Pressure from Last 3 Encounters:   02/06/18 145/84   10/31/17 146/90   08/15/17 (!) 167/109    Weight from Last 3 Encounters:   02/06/18 128.7 kg (283 lb 12.8 oz)   10/31/17 134.9 kg (297 lb 6.4 oz)   08/15/17 (!) 137.4 kg (303 lb)              Today, you had the following     No orders found for display         Today's Medication Changes          These changes are accurate as of 2/6/18  1:37 PM.  If you have any questions, ask your nurse or doctor.               Stop taking these medicines if you haven't already. Please contact your care team if you have questions.     atorvastatin 10 MG tablet   Commonly known as:  LIPITOR   Stopped by:  Nadine Vallecillo MD                    Primary Care Provider Office Phone # Fax #    Veena Stafford [phone]       [address] [address]        Equal Access to Services     FERCHO HAYES AH: Hadii manuel ku hadasho Soomaali, waaxda luqadaha, qaybta kaalmada adefrank, keny jones. So [facility] [phone].    ATENCIÓN: Si habla español, tiene a mullen disposición servicios gratuitos de asistencia lingüística. Llame al [phone].    We comply with applicable federal civil rights laws and [place] laws. We do not discriminate on the basis of race, color, national origin, age, disability, sex, sexual orientation, or gender identity.            Thank you!     Thank you for choosing [facility] WEIGHT MANAGEMENT  for your care. Our goal is always to provide you with excellent care. Hearing back from our patients is one way we can continue to improve our " services. Please take a few minutes to complete the written survey that you may receive in the mail after your visit with us. Thank you!             Your Updated Medication List - Protect others around you: Learn how to safely use, store and throw away your medicines at www.disposemymeds.org.          This list is accurate as of 2/6/18  1:37 PM.  Always use your most recent med list.                   Brand Name Dispense Instructions for use Diagnosis    acetaminophen 500 MG tablet    TYLENOL     Take 500 mg by mouth        albuterol 108 (90 BASE) MCG/ACT Inhaler    PROAIR HFA/PROVENTIL HFA/VENTOLIN HFA     Inhale 1-2 puffs into the lungs        buPROPion 150 MG 24 hr tablet    WELLBUTRIN XL     Take 150 mg by mouth        cetirizine 10 MG tablet    zyrTEC     Take 10 mg by mouth as needed        LISINOPRIL PO      Take 10 mg by mouth daily        meclizine 25 MG Chew      Take 25 mg by mouth 3 times daily        melatonin 1 MG Tabs tablet      Take 1 mg by mouth        phentermine 37.5 MG tablet    ADIPEX-P    14 tablet    1/2 tab each am    Morbid obesity due to excess calories (H)       topiramate 100 MG tablet    TOPAMAX    30 tablet    Take 1 tablet (100 mg) by mouth At Bedtime    Eating disorder       vitamin D 32615 UNIT capsule    ERGOCALCIFEROL     Take 50,000 Units by mouth

## 2018-02-06 NOTE — LETTER
"2018       RE: Kameron Park  [address]  [place] [facility] [place] [address]     Dear Colleague,    Thank you for referring your patient, Kameron Park, to the [facility] MEDICAL WEIGHT MANAGEMENT at [facility]. Please see a copy of my visit note below.        Return Medical Weight Management Note     Kameron Park  MRN:  [id number]  :  1965  SHOAIB:  2018    Dear Veena Stafford,    I had the pleasure of seeing your patient Kameron Park.  She is a 52 year old female who I am continuing to see for treatment of obesity related to:    Hypertension, Depression, Asthma, and back/knee pain    INTERVAL HISTORY:  Weight coming down, making lifestyle changes, going to intensive therapy    CURRENT WEIGHT:   283 lbs 12.8 oz    Wt Readings from Last 4 Encounters:   18 128.7 kg (283 lb 12.8 oz)   10/31/17 134.9 kg (297 lb 6.4 oz)   08/15/17 (!) 137.4 kg (303 lb)   17 (!) 138 kg (304 lb 4.8 oz)     Height:  5' 4\"  Body Mass Index:  Body mass index is 48.71 kg/(m^2).  Vitals:  B/P: 145/84, P: 62    Initial consult weight was 325 on 14.  Weight change since last seen on 10/31/2017 is down 14 pounds.   Total loss is 42 pounds.    Diet and Activity Changes Since Last Visit Reviewed With Patient 2018   I have made the following changes to my diet since my last visit: no   With regards to my diet, I am still struggling with: nothing   For breakfast, I typically eat: oatmeal or eggs   For lunch, I typically eat: anything i can find   For supper, I typically eat: chicken corn rice   For snack(s), I typically eat: -   I have made the following changes to my activity/exercise since my last visit: walking   With regards to my activity/exercise, I am still struggling with: nothing     ROS    MEDICATIONS:   Current Outpatient Prescriptions   Medication     melatonin 1 MG TABS tablet     vitamin D (ERGOCALCIFEROL) 17842 UNIT capsule     buPROPion (WELLBUTRIN XL) 150 MG " 24 hr tablet     topiramate (TOPAMAX) 100 MG tablet     phentermine (ADIPEX-P) 37.5 MG tablet     acetaminophen (TYLENOL) 500 MG tablet     albuterol (PROAIR HFA/PROVENTIL HFA/VENTOLIN HFA) 108 (90 BASE) MCG/ACT Inhaler     cetirizine (ZYRTEC) 10 MG tablet     meclizine 25 MG CHEW     LISINOPRIL PO     No current facility-administered medications for this visit.        Weight Loss Medication History Reviewed With Patient 2/6/2018   Which weight loss medications are you currently taking on a regular basis?  Topamax (topiramate)   Are you having any side effects from the weight loss medication that we have prescribed you? No     ASSESSMENT:   Morbid obesity with recent significant weight loss    PLAN:   Patient Instructions:  Food goal: 1,500 calorie food plan using meal replacements (see below) & nutrition referral was put in for you today     Activity goal: to increase walking to 60 minutes daily     Medication goal: to continue topiramate 100 mg at bedtime    Psychological health goal: to continue [place] [facility] [place]    FOLLOW-UP:    4 months.    Time: 15 min spent on evaluation, management, counseling, education, & motivational interviewing with greater than 50 % of the total time was spent on counseling and coordinating care    Sincerely,    Nadine Vallecillo MD

## 2018-02-06 NOTE — NURSING NOTE
"Chief Complaint   Patient presents with     Weight Problem     RMWM       Vitals:    02/06/18 1216   BP: 145/84   Pulse: 62   SpO2: 100%   Weight: 283 lb 12.8 oz   Height: 5' 4\"       Body mass index is 48.71 kg/(m^2).    Hernandez Edmondson CMA                          "

## 2018-02-19 NOTE — PROGRESS NOTES
"    Return Medical Weight Management Note     Kameron Park  MRN:  [id number]  :  1965  SHOAIB:  2018    Dear Veena Stafford,    I had the pleasure of seeing your patient Kameron Park.  She is a 52 year old female who I am continuing to see for treatment of obesity related to:    Hypertension, Depression, Asthma, and back/knee pain    INTERVAL HISTORY:  Weight coming down, making lifestyle changes, going to intensive therapy    CURRENT WEIGHT:   283 lbs 12.8 oz    Wt Readings from Last 4 Encounters:   18 128.7 kg (283 lb 12.8 oz)   10/31/17 134.9 kg (297 lb 6.4 oz)   08/15/17 (!) 137.4 kg (303 lb)   17 (!) 138 kg (304 lb 4.8 oz)     Height:  5' 4\"  Body Mass Index:  Body mass index is 48.71 kg/(m^2).  Vitals:  B/P: 145/84, P: 62    Initial consult weight was 325 on 14.  Weight change since last seen on 10/31/2017 is down 14 pounds.   Total loss is 42 pounds.    Diet and Activity Changes Since Last Visit Reviewed With Patient 2018   I have made the following changes to my diet since my last visit: no   With regards to my diet, I am still struggling with: nothing   For breakfast, I typically eat: oatmeal or eggs   For lunch, I typically eat: anything i can find   For supper, I typically eat: chicken corn rice   For snack(s), I typically eat: -   I have made the following changes to my activity/exercise since my last visit: walking   With regards to my activity/exercise, I am still struggling with: nothing     ROS    MEDICATIONS:   Current Outpatient Prescriptions   Medication     melatonin 1 MG TABS tablet     vitamin D (ERGOCALCIFEROL) 45937 UNIT capsule     buPROPion (WELLBUTRIN XL) 150 MG 24 hr tablet     topiramate (TOPAMAX) 100 MG tablet     phentermine (ADIPEX-P) 37.5 MG tablet     acetaminophen (TYLENOL) 500 MG tablet     albuterol (PROAIR HFA/PROVENTIL HFA/VENTOLIN HFA) 108 (90 BASE) MCG/ACT Inhaler     cetirizine (ZYRTEC) 10 MG tablet     meclizine 25 MG CHEW     LISINOPRIL PO "     No current facility-administered medications for this visit.        Weight Loss Medication History Reviewed With Patient 2/6/2018   Which weight loss medications are you currently taking on a regular basis?  Topamax (topiramate)   Are you having any side effects from the weight loss medication that we have prescribed you? No     ASSESSMENT:   Morbid obesity with recent significant weight loss    PLAN:   Patient Instructions:  Food goal: 1,500 calorie food plan using meal replacements (see below) & nutrition referral was put in for you today     Activity goal: to increase walking to 60 minutes daily     Medication goal: to continue topiramate 100 mg at bedtime    Psychological health goal: to continue intensive therapy sessions at [facility] with the team    FOLLOW-UP:    4 months.    Time: 15 min spent on evaluation, management, counseling, education, & motivational interviewing with greater than 50 % of the total time was spent on counseling and coordinating care    Sincerely,    Nadine Vallecillo MD

## 2019-11-07 ENCOUNTER — HEALTH MAINTENANCE LETTER (OUTPATIENT)
Age: 54
End: 2019-11-07

## 2020-02-23 ENCOUNTER — HEALTH MAINTENANCE LETTER (OUTPATIENT)
Age: 55
End: 2020-02-23

## 2020-11-29 ENCOUNTER — HEALTH MAINTENANCE LETTER (OUTPATIENT)
Age: 55
End: 2020-11-29

## 2021-02-14 ENCOUNTER — HEALTH MAINTENANCE LETTER (OUTPATIENT)
Age: 56
End: 2021-02-14

## 2021-09-25 ENCOUNTER — HEALTH MAINTENANCE LETTER (OUTPATIENT)
Age: 56
End: 2021-09-25

## 2022-01-11 ENCOUNTER — VIRTUAL VISIT (OUTPATIENT)
Dept: ENDOCRINOLOGY | Facility: CLINIC | Age: 57
End: 2022-01-11
Payer: COMMERCIAL

## 2022-01-11 VITALS — WEIGHT: 293 LBS | BODY MASS INDEX: 50.02 KG/M2 | HEIGHT: 64 IN

## 2022-01-11 DIAGNOSIS — E66.01 MORBID OBESITY (H): Primary | ICD-10-CM

## 2022-01-11 PROCEDURE — 99203 OFFICE O/P NEW LOW 30 MIN: CPT | Mod: 95 | Performed by: INTERNAL MEDICINE

## 2022-01-11 RX ORDER — TOPIRAMATE 25 MG/1
TABLET, FILM COATED ORAL
Qty: 90 TABLET | Refills: 11 | Status: SHIPPED | OUTPATIENT
Start: 2022-01-11 | End: 2023-02-08

## 2022-01-11 ASSESSMENT — MIFFLIN-ST. JEOR: SCORE: 1923.11
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
## 2022-01-11 NOTE — PATIENT INSTRUCTIONS
"(sent via letter since patient does not have internet access)    Nice to talk with you today in virtual clinic    VS: *Ht 1.626 m (5' 4\")   Wt 133.8 kg (295 lb)   BMI 50.64 kg/m      Food goals: 1,500 calorie food plan (REE 1923) using meal replacements such as Lean Cuisines, Healthy Choice, Smart Ones, Weight Watchers and supplement with fresh fruits and veggies and keep a daily food journal  Please drink a lot of water daily. Most people typically need about 2 liters of water daily and more if they are exercising, have a large weight, or have a fever or illness. Add Crystal Light for flavoring if desired. But no pop with calories in it.  Please keep a food journal of what you eat, calories in what you eat  Consider using applications for smart phones such as SpunLivePal  Focus on wet volumetrics, meaning, eat more foods that are high in water and fiber such as fruits and vegetables in order to get that full feeling. These are also good for your overall health as well.  Check out Dr. Nini Baird from [facility] - she has cookbooks with low calorie volumetric recipes  Try Cooking Light recipes for low calorie meal preparation and planning    Activity goals: walking program working up to 4 miles daily and home exercises using videos and home equipment  For exercises, check out [employer] [place]    Weight goals: weigh self 1-2x weekly and lose 1-2 lbs weekly    Medication goals:  Start topiramate 25 mg in evening, increase by 1 tab weekly until taking 75 mg at bedtime - use reliable form of birth control while taking topiramate since this medication is known to cause birth defects    Interested in working with a health ?  Health coaches work with you to improve your overall health and wellbeing.  They look at the whole person, and may involve discussion of different areas of life, including, but not limited to the four pillars of health (sleep, exercise, nutrition, and stress management). Discuss with your " care team if you would like to start working a health .     Health Coaching-3 Pack:      $99 for three health coaching visits (self pay; insurance does not cover health coaching)    Visits are done via phone at this time    Coaching is a partnership between the  and the client; Coaches do not prescribe or diagnose    Coaching helps inspire the client to reach his/her personal goals   - To schedule your first health  visit, call  [phone]  - To find out more about health coaching, contact Health  Jazmin at [email or web address]    Psychological Providers    Check with your insurance to see if the psychologist is covered, if not, ask your insurance company for a referral.    [facility]   Kieran Metcalf, PhD, LP   [phone]  Emily Pozo, PhD, LP  [phone]  Dodie Schwarz, PhD                 [phone]    Ricki Ramirez, Jasony,LP             [phone]    [place]  Sara Faria, PhD,   [phone]      [facility] Mental Health Service:           [phone]  We send a referral and patient is notified.    [place]  [facility]:  [phone]  Berta Ibarra PsyD, [facility]  Tracey Bell PSyD, LP         [phone]    [facility]   To schedule, please call member services on the back of your card.    MALAIKA Lima PsyD,LP,[facility] [phone]    [place]  Jluis De La Cruz MA, [phone]    [place]  Jason MilesyD, [phone]    [place]  Jason DoshiyD, [phone]  (fibromyalgia issues)    CONNER Dias  [phone]    [place]  Jason BarraganyD,LP  [phone]  Nba Cruz, PhD, LP  [phone]  Nba Caldwell, PhD, LP             [phone]    [place]  Deni Pate , [facility], [facility] [phone]    [place]e Remy PsyD, [phone]    CONNER Medellin PsyD, LP    [phone]     [place]      Outreach [phone]   Angela Dumont MA, LP  - ext. [phone]  Nba Solomon, PhD, LP - ext [phone]  Rodney Sterling PsyD, LP - ext [phone]    [place] Chua, PhD, [phone]  Charlie Espinoza PsyD, [phone]          [place] Escobar, PhD, [phone]    [place]der Eng PsyD, LP  [phone]            Call updates to VICKI Terrazas    [phone]  Last updated: 2019      RTC: quarterly as needed    Please use [place]ality to schedule your appointment(s) or call [phone] to schedule in [facility]    Best Wishes,  Dr. Nadine Vallecillo MD, MPH  Endocrinologist

## 2022-01-11 NOTE — NURSING NOTE
Chief Complaint   Patient presents with     Consult     New medical weight management.       Vitals:    01/11/22 1222   Weight: 295 lb       Body mass index is 50.64 kg/m .      Evelia Tyler, EMT  Surgery Clinic
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
## 2022-01-11 NOTE — LETTER
"Patient:  Kameron Park  :   1965  MRN:     [id number]    Ms.Charme CINTIA Park  [address]   [facility] [facility] [address]      2022    Dear ,  Nice to talk with you today in virtual clinic    VS: *Ht 1.626 m (5' 4\")   Wt 133.8 kg (295 lb)   BMI 50.64 kg/m      Food goals: 1,500 calorie food plan (REE 1923) using meal replacements such as Lean Cuisines, Healthy Choice, Smart Ones, Weight Watchers and supplement with fresh fruits and veggies and keep a daily food journal  Please drink a lot of water daily. Most people typically need about 2 liters of water daily and more if they are exercising, have a large weight, or have a fever or illness. Add Crystal Light for flavoring if desired. But no pop with calories in it.  Please keep a food journal of what you eat, calories in what you eat  Consider using applications for smart phones such as [employer]  Focus on wet volumetrics, meaning, eat more foods that are high in water and fiber such as fruits and vegetables in order to get that full feeling. These are also good for your overall health as well.  Check out Dr. Nini Baird from [facility] - she has cookbooks with low calorie volumetric recipes  Try Cooking Light recipes for low calorie meal preparation and planning    Activity goals: walking program working up to 4 miles daily and home exercises using videos and home equipment  For exercises, check out [employer]    Weight goals: weigh self 1-2x weekly and lose 1-2 lbs weekly    Medication goals:  Start topiramate 25 mg in evening, increase by 1 tab weekly until taking 75 mg at bedtime - use reliable form of birth control while taking topiramate since this medication is known to cause birth defects    Interested in working with a health ?  Health coaches work with you to improve your overall health and wellbeing.  They look at the whole person, and may involve discussion of different areas of life, including, but " not limited to the four pillars of health (sleep, exercise, nutrition, and stress management). Discuss with your care team if you would like to start working a health .     Health Coaching-3 Pack:      $99 for three health coaching visits (self pay; insurance does not cover health coaching)    Visits are done via phone at this time    Coaching is a partnership between the  and the client; Coaches do not prescribe or diagnose    Coaching helps inspire the client to reach his/her personal goals   - To schedule your first health  visit, call  [phone]  - To find out more about health coaching, contact Health  Jazmin at [email or web address]    Psychological Providers    Check with your insurance to see if the psychologist is covered, if not, ask your insurance company for a referral.    [facility]   Kieran Metcalf, PhD, LP   [phone]  Emily Pozo, PhD, LP  [phone]  Dodie Schwarz, PhD                 [phone]    Ricki Ramirez, Jasony,LP             [phone]    [place]  Sara Faria, PhD, LP  [phone]      [facility] Mental Health Service:           [phone]  We send a referral and patient is notified.    [place]  [facility]:  [phone]  Jason LandryyD, [facility]  Tracey Bell PSyD, LP         [phone]    [facility]   To schedule, please call member services on the back of your card.    MALAIKA Lima PsyD,LP,ABPP [phone]    [place]  Jluis De La Cruz MA, [phone]    [place]  Jason MilesyD, [phone]    [place]  Jason DoshiyD, [phone]  (fibromyalgia issues)    CONNER Dias  [phone]    [place]  Malini Henson,JasonyD,LP  [phone]  Nba Cruz, PhD, LP  [phone]  Nba Caldwell, PhD, LP             [phone]    [place] , [facility], [facility] [phone]    [facility]  Peewee Remy, Ps, [phone]    CONNER Medellin, Ps, LP   [phone]     [place]      Outreach [phone]   Angela Dumont MA, LP  - ext. [phone]  Nba Solomon, PhD, LP - ext [phone]  Rodney Sterling, Rojelio, LP - ext [phone]    [place] Chua, PhD, [phone]  Charlie Espinoza PsXochilt, [phone]          [place] Escobar, PhD, [phone]    [place]Inder Eng Ps, LP  [phone]            Call updates to VICKI Terrazas    [phone]  Last updated: 2019      RTC: quarterly as needed    Please use [employer] to schedule your appointment(s) or call [phone] to schedule in [facility]    Best Wishes,  Dr. Nadine Vallecillo MD, MPH

## 2022-01-15 ENCOUNTER — HEALTH MAINTENANCE LETTER (OUTPATIENT)
Age: 57
End: 2022-01-15

## 2022-01-21 ENCOUNTER — VIRTUAL VISIT (OUTPATIENT)
Dept: ENDOCRINOLOGY | Facility: CLINIC | Age: 57
End: 2022-01-21
Payer: COMMERCIAL

## 2022-01-21 DIAGNOSIS — Z71.3 NUTRITIONAL COUNSELING: Primary | ICD-10-CM

## 2022-01-21 DIAGNOSIS — E66.9 OBESITY: ICD-10-CM

## 2022-01-21 PROCEDURE — 97802 MEDICAL NUTRITION INDIV IN: CPT | Mod: TEL | Performed by: DIETITIAN, REGISTERED

## 2022-01-21 NOTE — PROGRESS NOTES
"Kameron Park is a 56 year old female who is being evaluated via a billable telephone visit.     The patient has been notified of following:     \"This telephone visit will be conducted via a call between you and your physician/provider. We have found that certain health care needs can be provided without the need for a physical exam.  This service lets us provide the care you need with a short phone conversation.  If a prescription is necessary we can send it directly to your pharmacy.  If lab work is needed we can place an order for that and you can then stop by our lab to have the test done at a later time.    Telephone visits are billed at different rates depending on your insurance coverage. During this emergency period, for some insurers they may be billed the same as an in-person visit.  Please reach out to your insurance provider with any questions.    If during the course of the call the physician/provider feels a telephone visit is not appropriate, you will not be charged for this service.\"    Patient has given verbal consent for Telephone visit?  Yes    How would you like to obtain your AVS? Mail    Phone call duration: 11 minutes    During this virtual visit the patient is located in [place], patient verifies this as the location during the entirety of this visit.       New Weight Management Nutrition Consultation    Kameron Park is a 56 year old female presents today for new weight management nutrition consultation.  Patient referred by Dr. Vallecillo on January 11, 2022.    Patient with Co-morbidities of obesity including:  Type II DM no  Renal Failure no  Sleep apnea no  Hypertension yes   Dyslipidemia no  Joint pain no  Back pain no  GERD no     Anthropometrics:  Estimated body mass index is 50.64 kg/m  as calculated from the following:    Height as of 1/11/22: 1.626 m (5' 4\").    Weight as of 1/11/22: 133.8 kg (295 lb).     Current weight (1/21/22): 286 lbs    Medications for Weight " Loss:  Topiramate    NUTRITION HISTORY  See MD note for details.    Pt has been doing well this month. Has lost ~10 lbs. Has been focusing on protein and vegetables as well as portion sizes. She reports she has been doing regular chair exercises.     Recent food recall:  Breakfast: oatmeal  Lunch: baked chicken with greens  Dinner: varies.  Snacks: peanut butter crackers  Beverages: water  Alcohol: none  Dining out: none    Physical Activity:  Chair exercises    Nutrition Prescription  Recommended energy/nutrient modification.  1500 calories/day (per MD)    Nutrition Diagnosis  Obesity r/t long history of positive energy balance aeb BMI >30.    Nutrition Intervention  Materials/education provided on Volumetric eating to help satiety level on fewer calories; portion control and healthy food choices (Plate Method and Volumetrics handouts), 100 calorie snack choices, meal and snack planning and websites, sample meal plans     Patient demonstrates understanding.    Expected Engagement: good    Nutrition Goals  1) Continue focusing on lean proteins and non-starchy vegetables  2) Consume at least 60 grams of protein per day  3) Continue physical activity as able    The Plate Method  [email or web address]    Protein Sources   [email or web address]     Carbohydrates  [email or web address]     Mindful Eating  [email or web address]     Summary of Volumetrics Eating Plan  [email or web address]     Follow-Up:  1 month, PRN    Time spent with patient: 11 minutes.  AMINATA OLVERA RD, LD

## 2022-01-21 NOTE — PATIENT INSTRUCTIONS
Benjamin Melo!    Follow-up with RD in 1 month    Thank you,    Kimi Dodd, RD, LD  If you would like to schedule or reschedule an appointment with the RD, please call [phone]    Nutrition Goals  1) Continue focusing on lean proteins and non-starchy vegetables  2) Consume at least 60 grams of protein per day  3) Continue physical activity as able    The Plate Method  [email or web address]    Protein Sources   [email or web address]     Carbohydrates  [email or web address]     Mindful Eating  [email or web address]     Summary of Volumetrics Eating Plan  [email or web address]       Interested in working with a health ? Health coaches work with you to improve your overall health and wellbeing. They look at the whole person, and may involve discussion of different areas of life, including, but not limited to the four pillars of health (sleep, exercise, nutrition, and stress management). Discuss with your care team if you would like to start working a health .    Health Coaching-3 Pack:    $99 for three health coaching visits    Visits may be done in person or via phone    Coaching is a partnership between the  and the client; Coaches do not prescribe or diagnose    Coaching helps inspire the client to reach his/her personal goals    COMPREHENSIVE WEIGHT MANAGEMENT PROGRAM  VIRTUAL SUPPORT GROUPS    For Support Group Information:      We offer support groups for patients who are working on weight loss and considering, preparing for or have had weight loss surgery.   There is no cost for this opportunity.  You are invited to attend the?Virtual Support Groups?provided by any of the following locations:    1. [facility] via Visualtising Teams with Daniela Sarah RN  2.   [place] via Retrieve with Peewee lAaniz, PhD, LP  3.   [place] via Visualtising Teams with Jazmin Garcia RN  4.   [facility] via Visualtising Teams with Jazmin Ewing [facility]    The  "following Support Group information can also be found on our website:  [email or web address]      [facility] Weight Loss Surgery Support Group    [facility] Weight Loss Surgery Support Group  The support group is a patient-lead forum that meets monthly to share experiences, encouragement and education. It is open to those who have had weight loss surgery, are scheduled for surgery, and those who are considering surgery.   WHEN: This group meets on the 3rd Wednesday of each month from 5:00PM - 6:00PM virtually using Microsoft Teams.   FACILITATOR: Led by Daniela Sarah RD, LD, RN, the program's Clinical Coordinator.   TO REGISTER: Please contact the clinic via byyd or call the nurse line directly at [phone] to inform our staff that you would like an invite sent to you and to let us know the email you would like the invite sent to. Prior to the meeting, a link with directions on how to join the meeting will be sent to you.    2022 Meetings  January 19: \"Let's Talk\" a time for the group to share.  February 16: \"Let's Talk\" a time for the group to share.  March 16: Guest Speakers: Psychologists, Juliette Mccracken, PhD,LP and Tiffani Echols PsyD,  April 20: Guest Speaker: Health , Ana Truong, [facility],CHES, CPT  May 18: Guest Speaker: DietitianAb, JOSEPH, LP  Yenny 15: \"Let's Talk\" a time for the group to share.  July 20: \"Let's Talk\" a time for the group to share.  August 17: TBA  September 21: TBA  October 19: Guest Speaker: Dr Bernardino Pickett MD Pulmonologist and Sleep Medicine Physician, \"Getting a Good Night's Sleep\".  November 16: TBA  December 21: TBA    [facility] Support Groups    Connections: Bariatric Care Support Group?  This is open to all [facility] (and those external to this program) pre- and post- operative bariatric surgery patients as well as their support " "system.   WHEN: This group meets the 2nd Tuesday of each month from 6:30 PM - 8:00 PM virtually using Microsoft Teams.   FACILITATOR: Led by Peewee Alaniz, Ph.D who is a Licensed Psychologist with the [facility] Weight [facility].   TO REGISTER: Please send an email to Peewee Alaniz, Ph.D., LP at?[email or web address]?if you would like an invitation to the group and to learn about using Microsoft Teams.    2022 Meetings  January 11: Jessica Velarde, PharmD, Pharmacy Resident at [facility], \"Medications and Bariatric Surgery\".  February 8: Open Forum  March 8  April 12  May 10  Yenny 14    Connections: Post-Operative Bariatric Surgery Support Group  This is a support group for [facility] bariatric patients (and those external to [facility]) who have had bariatric surgery and are at least 3 months post-surgery.  WHEN: This support group meets the 4th Wednesday of the month from 11:00 AM - 12:00 PM virtually using Microsoft Teams.   FACILITATOR: Led by Certified Bariatric Nurse, Jazmin Garcia RN.   TO REGISTER: Please send an email to Jazmin at [email or web address] if you would like an invitation to the group and to learn about using Microsoft Teams.    2022 Meetings  January 26  February 23  March 23  April 27  May 25  Yenny 22    [facility] Healthy Lifestyle Virtual Support Group    Healthy Lifestyle Virtual Support Group?  This is 60 minutes of small group guided discussion, support and resources. All are welcome who want a healthy lifestyle.  WHEN: This group meets monthly on a Friday from 12:30 PM - 1:30 PM virtually using Microsoft Teams.   FACILITATOR: Led by National Board Certified Health and , Jazmin Ewing [facility].   TO REGISTER: Please send an email to Jazmin [email or web address] to receive monthly invites to the group or if you have any questions about having a health .  Prior to the " "meeting, a link with directions on how to join the meeting will be sent to you.    2022 Meetings  January 21: Toshia Morfin MS, RN, CIC, CBN, \"Healthy Habits\"  February 25: Open Forum  March 18: \"Setting Limits and Boundaries\"  April 29: Amanda Myers RD, \"Meal Planning Made Easy\"  May 20: Open Forum  June: To be determined                  "

## 2022-01-21 NOTE — LETTER
"1/21/2022       RE: Kameron Park  [address]  [place] [address]     Dear Colleague,    Thank you for referring your patient, Kameron Park, to the [facility]NAGEMENT [facility] [place] at [facility]. Please see a copy of my visit note below.    Kameron Park is a 56 year old female who is being evaluated via a billable telephone visit.     The patient has been notified of following:     \"This telephone visit will be conducted via a call between you and your physician/provider. We have found that certain health care needs can be provided without the need for a physical exam.  This service lets us provide the care you need with a short phone conversation.  If a prescription is necessary we can send it directly to your pharmacy.  If lab work is needed we can place an order for that and you can then stop by our lab to have the test done at a later time.    Telephone visits are billed at different rates depending on your insurance coverage. During this emergency period, for some insurers they may be billed the same as an in-person visit.  Please reach out to your insurance provider with any questions.    If during the course of the call the physician/provider feels a telephone visit is not appropriate, you will not be charged for this service.\"    Patient has given verbal consent for Telephone visit?  Yes    How would you like to obtain your AVS? Mail    Phone call duration: 11 minutes    During this virtual visit the patient is located in [place], patient verifies this as the location during the entirety of this visit.       New Weight Management Nutrition Consultation    Kameron Park is a 56 year old female presents today for new weight management nutrition consultation.  Patient referred by Dr. Vallecillo on January 11, 2022.    Patient with Co-morbidities of obesity including:  Type II DM no  Renal Failure no  Sleep apnea " "no  Hypertension yes   Dyslipidemia no  Joint pain no  Back pain no  GERD no     Anthropometrics:  Estimated body mass index is 50.64 kg/m  as calculated from the following:    Height as of 1/11/22: 1.626 m (5' 4\").    Weight as of 1/11/22: 133.8 kg (295 lb).     Current weight (1/21/22): 286 lbs    Medications for Weight Loss:  Topiramate    NUTRITION HISTORY  See MD note for details.    Pt has been doing well this month. Has lost ~10 lbs. Has been focusing on protein and vegetables as well as portion sizes. She reports she has been doing regular chair exercises.     Recent food recall:  Breakfast: oatmeal  Lunch: baked chicken with greens  Dinner: varies.  Snacks: peanut butter crackers  Beverages: water  Alcohol: none  Dining out: none    Physical Activity:  Chair exercises    Nutrition Prescription  Recommended energy/nutrient modification.  1500 calories/day (per MD)    Nutrition Diagnosis  Obesity r/t long history of positive energy balance aeb BMI >30.    Nutrition Intervention  Materials/education provided on Volumetric eating to help satiety level on fewer calories; portion control and healthy food choices (Plate Method and Volumetrics handouts), 100 calorie snack choices, meal and snack planning and websites, sample meal plans     Patient demonstrates understanding.    Expected Engagement: good    Nutrition Goals  1) Continue focusing on lean proteins and non-starchy vegetables  2) Consume at least 60 grams of protein per day  3) Continue physical activity as able    The Plate Method  [email or web address]    Protein Sources   [email or web address]     Carbohydrates  [email or web address]     Mindful Eating  [email or web address]     Summary of Volumetrics Eating Plan  [email or web address]     Follow-Up:  1 month, PRN    Time spent with patient: 11 minutes.  AMINATA OLVERA RD, LD      "

## 2022-02-25 ENCOUNTER — VIRTUAL VISIT (OUTPATIENT)
Dept: ENDOCRINOLOGY | Facility: CLINIC | Age: 57
End: 2022-02-25
Payer: COMMERCIAL

## 2022-02-25 DIAGNOSIS — E66.9 OBESITY: ICD-10-CM

## 2022-02-25 DIAGNOSIS — Z71.3 NUTRITIONAL COUNSELING: Primary | ICD-10-CM

## 2022-02-25 PROCEDURE — 97803 MED NUTRITION INDIV SUBSEQ: CPT | Mod: TEL | Performed by: DIETITIAN, REGISTERED

## 2022-02-25 NOTE — PROGRESS NOTES
"Kameron Park is a 56 year old female who is being evaluated via a billable telephone visit.     The patient has been notified of following:     \"This telephone visit will be conducted via a call between you and your physician/provider. We have found that certain health care needs can be provided without the need for a physical exam.  This service lets us provide the care you need with a short phone conversation.  If a prescription is necessary we can send it directly to your pharmacy.  If lab work is needed we can place an order for that and you can then stop by our lab to have the test done at a later time.    Telephone visits are billed at different rates depending on your insurance coverage. During this emergency period, for some insurers they may be billed the same as an in-person visit.  Please reach out to your insurance provider with any questions.    If during the course of the call the physician/provider feels a telephone visit is not appropriate, you will not be charged for this service.\"    Patient has given verbal consent for Telephone visit?  Yes    How would you like to obtain your AVS? Mail    Phone call duration: 12 minutes    During this virtual visit the patient is located in [place], patient verifies this as the location during the entirety of this visit.       Return Weight Management Nutrition Consultation    Kameron Park is a 56 year old female presents today for a return weight management nutrition consultation.  Patient referred by Dr. Vallecillo on January 11, 2022.    Patient with Co-morbidities of obesity including:  Type II DM no  Renal Failure no  Sleep apnea no  Hypertension yes   Dyslipidemia no  Joint pain no  Back pain no  GERD no     Anthropometrics:  Estimated body mass index is 50.64 kg/m  as calculated from the following:    Height as of 1/11/22: 1.626 m (5' 4\").    Weight as of 1/11/22: 133.8 kg (295 lb).     Current weight (2/25/22): 281 lbs per pt    Medications for Weight " Loss:  Topiramate    NUTRITION HISTORY  See MD note for details.    Pt has been doing well this month. Has lost ~10 lbs. Has been focusing on protein and vegetables as well as portion sizes. She reports she has been doing regular chair exercises.     2/24/22: Pt has reduced sugar consumption and increased vegetables. She has been working on chair exercises for physical activity. She has lost weight since last visit. Still struggling with having 3 meals every day d/t low appetite.    Recent food recall:  Breakfast: oatmeal  Lunch: baked chicken with greens  Dinner: varies.  Snacks: peanut butter crackers  Beverages: water  Alcohol: none  Dining out: none    Physical Activity:  Chair exercises    Progress on Previous Goals  1) Continue focusing on lean proteins and non-starchy vegetables  2) Consume at least 60 grams of protein per day  3) Continue physical activity as able    Nutrition Prescription  Recommended energy/nutrient modification.  1500 calories/day (per MD)    Nutrition Diagnosis  Obesity r/t long history of positive energy balance aeb BMI >30.    Nutrition Intervention  Materials/education provided on Volumetric eating to help satiety level on fewer calories; portion control and healthy food choices (Plate Method and Volumetrics handouts), 100 calorie snack choices, meal and snack planning and websites, sample meal plans     Patient demonstrates understanding.    Expected Engagement: good    Nutrition Goals  1) Continue focusing on lean proteins and non-starchy vegetables  2) Consume at least 60 grams of protein per day  3) Continue physical activity as able  4) Have 3 meals per day    The Plate Method  [email or web address]://[email or web address]    Protein Sources   [email or web address]://[employer]/[email or web address]     Carbohydrates  [email or web address]     Mindful Eating  [email or web address]://[email or web address]     Summary of Volumetrics Eating Plan  [email or web address]     Follow-Up:  1 month, PRN    Time spent with  patient: 12 minutes.  AMINATA OLVERA RD, LD

## 2022-02-25 NOTE — PATIENT INSTRUCTIONS
Hi Charme!    Follow-up with RD in 1-2 months    Thank you,    Kimi Dodd, JOSEPH, LD  If you would like to schedule or reschedule an appointment with the RD, please call [phone]    Nutrition Goals  1) Continue focusing on lean proteins and non-starchy vegetables  2) Consume at least 60 grams of protein per day  3) Continue physical activity as able  4) Have 3 meals per day    The Plate Method  [email or web address]://www.[email or web address]    Protein Sources   [email or web address]://[employer]/[email or web address]     Carbohydrates  [email or web address]://[email or web address]     Mindful Eating  [email or web address]://[employer]/[email or web address]     Summary of Volumetrics Eating Plan  [email or web address]         Interested in working with a health ? Health coaches work with you to improve your overall health and wellbeing. They look at the whole person, and may involve discussion of different areas of life, including, but not limited to the four pillars of health (sleep, exercise, nutrition, and stress management). Discuss with your care team if you would like to start working a health .    Health Coaching-3 Pack:    $99 for three health coaching visits    Visits may be done in person or via phone    Coaching is a partnership between the  and the client; Coaches do not prescribe or diagnose    Coaching helps inspire the client to reach his/her personal goals    COMPREHENSIVE WEIGHT MANAGEMENT PROGRAM  VIRTUAL SUPPORT GROUPS    For Support Group Information:      We offer support groups for patients who are working on weight loss and considering, preparing for or have had weight loss surgery.   There is no cost for this opportunity.  You are invited to attend the?Virtual Support Groups?provided by any of the following locations:    1. [facility] via Microsoft Teams with Daniela Sarah RN  2.   [place] via Rupeetalk with Peewee Alaniz, PhD, LP  3.   [place] via Rupeetalk with Jazmin Garcia RN  4.   [facility] via Rupeetalk with  "Jazmin Ewing, [facility]    The following Support Group information can also be found on our website:  [email or web address]      [facility] Weight Loss Surgery Support Group    [facility] Weight Loss Surgery Support Group  The support group is a patient-lead forum that meets monthly to share experiences, encouragement and education. It is open to those who have had weight loss surgery, are scheduled for surgery, and those who are considering surgery.   WHEN: This group meets on the 3rd Wednesday of each month from 5:00PM - 6:00PM virtually using Microsoft Teams.   FACILITATOR: Led by Daniela Sarah RD, LD, RN, the program's Clinical Coordinator.   TO REGISTER: Please contact the clinic via Surround App or call the nurse line directly at [phone] to inform our staff that you would like an invite sent to you and to let us know the email you would like the invite sent to. Prior to the meeting, a link with directions on how to join the meeting will be sent to you.    2022 Meetings  January 19: \"Let's Talk\" a time for the group to share.  February 16: \"Let's Talk\" a time for the group to share.  March 16: Guest Speakers: Psychologists, Juliette Mccracken, PhD,LP and Tiffani Echols, PsyD,  April 20: Guest Speaker: Health , Ana Truong, [facility],CHES, CPT  May 18: Guest Speaker: DietitianAb, JOSEPH, LP  Yenny 15: \"Let's Talk\" a time for the group to share.  July 20: \"Let's Talk\" a time for the group to share.  August 17: TBA  September 21: TBA  October 19: Guest Speaker: Dr Bernardino Pickett MD Pulmonologist and Sleep Medicine Physician, \"Getting a Good Night's Sleep\".  November 16: TBA  December 21: TBA    [facility] Support Groups    Connections: Bariatric Care Support Group?  This is open to all [facility] (and those external to this program) pre- and post- operative bariatric surgery patients " "as well as their support system.   WHEN: This group meets the 2nd Tuesday of each month from 6:30 PM - 8:00 PM virtually using Microsoft Teams.   FACILITATOR: Led by Peewee Alaniz, Ph.D who is a Licensed Psychologist with the [facility] Comprehensive Weight Management Program.   TO REGISTER: Please send an email to Peewee Alaniz, Ph.D., LP at?[email or web address]?if you would like an invitation to the group and to learn about using Microsoft Teams.    2022 Meetings  January 11: Jessica Velarde, PharmD, Pharmacy Resident at [facility], \"Medications and Bariatric Surgery\".  February 8: Open Forum  March 8  April 12  May 10  Yenny 14    Connections: Post-Operative Bariatric Surgery Support Group  This is a support group for [facility] bariatric patients (and those external to [facility]) who have had bariatric surgery and are at least 3 months post-surgery.  WHEN: This support group meets the 4th Wednesday of the month from 11:00 AM - 12:00 PM virtually using Microsoft Teams.   FACILITATOR: Led by Certified Bariatric Nurse, Jazmin Garcia RN.   TO REGISTER: Please send an email to Jazmin at [email or web address] if you would like an invitation to the group and to learn about using Microsoft Teams.    2022 Meetings  January 26  February 23  March 23  April 27  May 25  Yenny 22    [facility] Healthy Lifestyle Virtual Support Group    Healthy Lifestyle Virtual Support Group?  This is 60 minutes of small group guided discussion, support and resources. All are welcome who want a healthy lifestyle.  WHEN: This group meets monthly on a Friday from 12:30 PM - 1:30 PM virtually using Microsoft Teams.   FACILITATOR: Led by National Board Certified Health and , Jazmin Ewing [facility].   TO REGISTER: Please send an email to Jazmin [email or web address] to receive monthly invites to the group or if you have any questions about having a health " ".  Prior to the meeting, a link with directions on how to join the meeting will be sent to you.    2022 Meetings  January 21: Toshia Morfin MS, RN, CIC, CBN, \"Healthy Habits\"  February 25: Open Forum  March 18: \"Setting Limits and Boundaries\"  April 29: Amanda Myers RD, \"Meal Planning Made Easy\"  May 20: Open Forum  June: To be determined                  "

## 2022-02-25 NOTE — LETTER
"2/25/2022       RE: Kameron Park  [address]  [facility] [address]     Dear Colleague,    Thank you for referring your patient, Kameron Park, to the [facility]NAGEMENT [facility] [place] at [facility]. Please see a copy of my visit note below.    Kameron Park is a 56 year old female who is being evaluated via a billable telephone visit.     The patient has been notified of following:     \"This telephone visit will be conducted via a call between you and your physician/provider. We have found that certain health care needs can be provided without the need for a physical exam.  This service lets us provide the care you need with a short phone conversation.  If a prescription is necessary we can send it directly to your pharmacy.  If lab work is needed we can place an order for that and you can then stop by our lab to have the test done at a later time.    Telephone visits are billed at different rates depending on your insurance coverage. During this emergency period, for some insurers they may be billed the same as an in-person visit.  Please reach out to your insurance provider with any questions.    If during the course of the call the physician/provider feels a telephone visit is not appropriate, you will not be charged for this service.\"    Patient has given verbal consent for Telephone visit?  Yes    How would you like to obtain your AVS? Mail    Phone call duration: 12 minutes    During this virtual visit the patient is located in [place], patient verifies this as the location during the entirety of this visit.       Return Weight Management Nutrition Consultation    Kameron Park is a 56 year old female presents today for a return weight management nutrition consultation.  Patient referred by Dr. Vallecillo on January 11, 2022.    Patient with Co-morbidities of obesity including:  Type II DM no  Renal Failure no  Sleep apnea " "no  Hypertension yes   Dyslipidemia no  Joint pain no  Back pain no  GERD no     Anthropometrics:  Estimated body mass index is 50.64 kg/m  as calculated from the following:    Height as of 1/11/22: 1.626 m (5' 4\").    Weight as of 1/11/22: 133.8 kg (295 lb).     Current weight (2/25/22): 281 lbs per pt    Medications for Weight Loss:  Topiramate    NUTRITION HISTORY  See MD note for details.    Pt has been doing well this month. Has lost ~10 lbs. Has been focusing on protein and vegetables as well as portion sizes. She reports she has been doing regular chair exercises.     2/24/22: Pt has reduced sugar consumption and increased vegetables. She has been working on chair exercises for physical activity. She has lost weight since last visit. Still struggling with having 3 meals every day d/t low appetite.    Recent food recall:  Breakfast: oatmeal  Lunch: baked chicken with greens  Dinner: varies.  Snacks: peanut butter crackers  Beverages: water  Alcohol: none  Dining out: none    Physical Activity:  Chair exercises    Progress on Previous Goals  1) Continue focusing on lean proteins and non-starchy vegetables  2) Consume at least 60 grams of protein per day  3) Continue physical activity as able    Nutrition Prescription  Recommended energy/nutrient modification.  1500 calories/day (per MD)    Nutrition Diagnosis  Obesity r/t long history of positive energy balance aeb BMI >30.    Nutrition Intervention  Materials/education provided on Volumetric eating to help satiety level on fewer calories; portion control and healthy food choices (Plate Method and Volumetrics handouts), 100 calorie snack choices, meal and snack planning and websites, sample meal plans     Patient demonstrates understanding.    Expected Engagement: good    Nutrition Goals  1) Continue focusing on lean proteins and non-starchy vegetables  2) Consume at least 60 grams of protein per day  3) Continue physical activity as able  4) Have 3 meals per " day    The Plate Method  [email or web address]    Protein Sources   [email or web address]     Carbohydrates  [email or web address]     Mindful Eating  [email or web address]     Summary of Volumetrics Eating Plan  [email or web address]     Follow-Up:  1 month, PRN    Time spent with patient: 12 minutes.  AMINATA OLVERA RD, LD

## 2022-03-12 ENCOUNTER — HEALTH MAINTENANCE LETTER (OUTPATIENT)
Age: 57
End: 2022-03-12

## 2022-12-26 ENCOUNTER — HEALTH MAINTENANCE LETTER (OUTPATIENT)
Age: 57
End: 2022-12-26

## 2023-01-12 DIAGNOSIS — E66.01 MORBID OBESITY (H): ICD-10-CM

## 2023-01-12 RX ORDER — TOPIRAMATE 25 MG/1
TABLET, FILM COATED ORAL
Qty: 90 TABLET | Refills: 11 | OUTPATIENT
Start: 2023-01-12

## 2023-01-12 NOTE — TELEPHONE ENCOUNTER
Recieved refill request for    topiramate (TOPAMAX) 25 MG tablet      . Patient needs appointment scheduled prior to any refills. Clinic Coordinator notified and will follow up with the patient as appropriate. The pharmacy has been notified that the medication will not be refilled prior to an appointment being scheduled.

## 2023-02-08 ENCOUNTER — VIRTUAL VISIT (OUTPATIENT)
Dept: ENDOCRINOLOGY | Facility: CLINIC | Age: 58
End: 2023-02-08
Payer: COMMERCIAL

## 2023-02-08 VITALS — HEIGHT: 64 IN | BODY MASS INDEX: 39.78 KG/M2 | WEIGHT: 233 LBS

## 2023-02-08 DIAGNOSIS — E66.812 CLASS 2 SEVERE OBESITY WITH SERIOUS COMORBIDITY AND BODY MASS INDEX (BMI) OF 39.0 TO 39.9 IN ADULT, UNSPECIFIED OBESITY TYPE (H): ICD-10-CM

## 2023-02-08 DIAGNOSIS — E66.01 CLASS 2 SEVERE OBESITY WITH SERIOUS COMORBIDITY AND BODY MASS INDEX (BMI) OF 39.0 TO 39.9 IN ADULT, UNSPECIFIED OBESITY TYPE (H): ICD-10-CM

## 2023-02-08 PROBLEM — K80.20 CHOLELITHIASES: Status: ACTIVE | Noted: 2019-12-03

## 2023-02-08 PROBLEM — M25.511 CHRONIC PAIN OF BOTH SHOULDERS: Status: ACTIVE | Noted: 2017-08-22

## 2023-02-08 PROBLEM — G56.00 CARPAL TUNNEL SYNDROME: Status: ACTIVE | Noted: 2020-05-18

## 2023-02-08 PROBLEM — G44.89 HEADACHE SYNDROME: Status: ACTIVE | Noted: 2019-01-29

## 2023-02-08 PROBLEM — J30.9 ALLERGIC CONJUNCTIVITIS AND RHINITIS, BILATERAL: Status: ACTIVE | Noted: 2019-06-06

## 2023-02-08 PROBLEM — F14.20 COCAINE USE DISORDER, SEVERE, DEPENDENCE (H): Status: ACTIVE | Noted: 2017-10-23

## 2023-02-08 PROBLEM — E55.9 VITAMIN D DEFICIENCY: Status: ACTIVE | Noted: 2017-12-16

## 2023-02-08 PROBLEM — F43.10 POSTTRAUMATIC STRESS DISORDER: Status: ACTIVE | Noted: 2017-10-23

## 2023-02-08 PROBLEM — M19.90 OSTEOARTHROSIS: Status: ACTIVE | Noted: 2019-11-19

## 2023-02-08 PROBLEM — G89.29 CHRONIC PAIN OF BOTH SHOULDERS: Status: ACTIVE | Noted: 2017-08-22

## 2023-02-08 PROBLEM — M65.4 DE QUERVAIN'S TENOSYNOVITIS, LEFT: Status: ACTIVE | Noted: 2021-02-16

## 2023-02-08 PROBLEM — M25.512 CHRONIC PAIN OF BOTH SHOULDERS: Status: ACTIVE | Noted: 2017-08-22

## 2023-02-08 PROBLEM — M72.2 PLANTAR FASCIITIS OF LEFT FOOT: Status: ACTIVE | Noted: 2020-01-16

## 2023-02-08 PROBLEM — F31.10 BIPOLAR AFFECTIVE DISORDER, MANIC (H): Status: ACTIVE | Noted: 2023-02-08

## 2023-02-08 PROBLEM — H10.13 ALLERGIC CONJUNCTIVITIS AND RHINITIS, BILATERAL: Status: ACTIVE | Noted: 2019-06-06

## 2023-02-08 PROBLEM — M17.0 BILATERAL PRIMARY OSTEOARTHRITIS OF KNEE: Status: ACTIVE | Noted: 2020-09-09

## 2023-02-08 PROCEDURE — 99213 OFFICE O/P EST LOW 20 MIN: CPT | Mod: 93

## 2023-02-08 RX ORDER — LISINOPRIL 40 MG/1
1 TABLET ORAL DAILY
COMMUNITY
Start: 2021-10-21 | End: 2024-09-24

## 2023-02-08 RX ORDER — NAPROXEN 500 MG/1
500 TABLET ORAL 2 TIMES DAILY
COMMUNITY
Start: 2021-07-13

## 2023-02-08 RX ORDER — PHENTERMINE HYDROCHLORIDE 37.5 MG/1
TABLET ORAL
Qty: 14 TABLET | Refills: 3 | Status: CANCELLED | OUTPATIENT
Start: 2023-02-08

## 2023-02-08 RX ORDER — CETIRIZINE HYDROCHLORIDE 10 MG/1
10 TABLET ORAL
COMMUNITY
Start: 2021-07-21 | End: 2023-02-08

## 2023-02-08 RX ORDER — CAMPHOR 0.45 %
1 GEL (GRAM) TOPICAL
COMMUNITY
Start: 2021-08-24 | End: 2023-08-17

## 2023-02-08 RX ORDER — CAPSAICIN 0.025 %
1 CREAM (GRAM) TOPICAL
COMMUNITY
Start: 2021-11-24

## 2023-02-08 RX ORDER — FLUTICASONE PROPIONATE 50 MCG
2 SPRAY, SUSPENSION (ML) NASAL
COMMUNITY
Start: 2021-07-21

## 2023-02-08 RX ORDER — MOMETASONE FUROATE AND FORMOTEROL FUMARATE DIHYDRATE 200; 5 UG/1; UG/1
AEROSOL RESPIRATORY (INHALATION)
COMMUNITY
Start: 2021-07-21

## 2023-02-08 RX ORDER — FLUOXETINE 40 MG/1
40 CAPSULE ORAL DAILY
COMMUNITY
Start: 2020-09-28

## 2023-02-08 RX ORDER — TRAZODONE HYDROCHLORIDE 100 MG/1
100 TABLET ORAL DAILY
COMMUNITY
Start: 2020-09-28

## 2023-02-08 RX ORDER — AMLODIPINE BESYLATE 5 MG/1
1 TABLET ORAL DAILY
COMMUNITY
Start: 2021-08-23 | End: 2023-08-17

## 2023-02-08 RX ORDER — DIAZEPAM 5 MG
5 TABLET ORAL PRN
COMMUNITY
Start: 2021-02-08

## 2023-02-08 ASSESSMENT — PAIN SCALES - GENERAL: PAINLEVEL: SEVERE PAIN (6)

## 2023-02-08 NOTE — PROGRESS NOTES
Kameron is a 57 year old who is being evaluated via a billable telephone visit.      What phone number would you like to be contacted at? [phone]  How would you like to obtain your AVS? Evaristo    Distant Location (provider location):  Off-site  Phone call duration: 20 minutes    During this telephone visit the patient is located in [place], patient verifies this as the location during the entirety of this visit.   Return Medical Weight Management Note     Kameron Park  MRN:  [id number]  :  1965  SHOAIB:  2023    Dear Veena Stafford, FILIPE,    I had the pleasure of seeing your patient Kameron Park. She is a 57 year old female who I am continuing to see for treatment of obesity related to:       2022   I have the following health issues associated with obesity: High Blood Pressure, Asthma, Osteoarthritis (joint disease)   I have the following symptoms associated with obesity: Knee Pain, Depression, Back Pain, Fatigue       Assessment & Plan   Problem List Items Addressed This Visit        Digestive    Class 2 severe obesity with serious comorbidity and body mass index (BMI) of 39.0 to 39.9 in adult (H)     Previously seen by Dr. Vallecillo. Last seen 2022. Restarted Topiramate at that time.     Has lost 63lbs since her last visit. She is very happy with these results.     Currently taking Topiramate 75mg at night. Denies any side effects. Is helping with hunger. She would like to continue this dose.     Patient stated she is currently taking Phentermine 37.5mg 1/2 tablet daily. However, from Dr. Vallecillo's note in , phentermine was stopped due to blood pressure not at goal of <140/90. She did not restart the medication at her last visit either. From chart review it does not look like she is currently being prescribed it from another provider. We will reach out to the pharmacy to confirm. Last saw her PCP in 2023 and BP was WNL, however she was just off her BP for the past 2 months. Otherwise, I  am not comfortable prescribing Phentermine at this time with her history of uncontrolled HTN and bipolar. Can have further discussion at next visit.          Relevant Medications    topiramate (TOPAMAX) 25 MG tablet      1. Continue Topiramate 75mg at night. Refill Sent.   2. Can discuss restarting Phentermine at next visit.   3. Follow up with Shoshana in 3 months.     INTERVAL HISTORY:  Dr. Vallecillo 1/11/2022  Started Topiramate   Previously tried phentermine and topirmate     Has lost 62lb since last visit.     Anti-obesity medications:     Current:   Topiramate 75mg - taking at night. No side effects. Has been helpful with hunger. No change in memory.     Phentermine 37.5mg - 1/2 tablet in the morning. No side effects. Has been helpful with hunger. BP is 124/60. No manic episodes. Patient states she is currently taking and is prescribed by out clinic. However, last refill was in 2017 and no records show her PCP is prescribing.     Recent diet changes: Increase in vegetables. Decrease portion sizes. Using Moms Meals, and is helpful with portions. Decrease in fried food. Baking more food.     Recent exercise/activity changes: Decrease with weather. Tries to walk when the sun is out.     Recent stressors: Stable     Recent sleep changes: Stable     Bipolar - mood well controlled on medications. followed by psychiatrist and therapist.     HTN - somewhat controlled. Just restarted her BP medications. Followed by PCP      CURRENT WEIGHT:   233 lbs 0 oz    Initial Weight (lbs): 295 lbs  Last Visits Weight: 133.8 kg (295 lb)  Cumulative weight loss (lbs): 62  Weight Loss Percentage: 21.02%    Changes and Difficulties 2/8/2023   I have made the following changes to my diet since my last visit: Eating less food   With regards to my diet, I am still struggling with: none   I have made the following changes to my activity/exercise since my last visit: not exercising as much   With regards to my activity/exercise, I am still  struggling with: not feeling up to exercising         MEDICATIONS:   Current Outpatient Medications   Medication Sig Dispense Refill     albuterol (PROAIR HFA/PROVENTIL HFA/VENTOLIN HFA) 108 (90 BASE) MCG/ACT Inhaler Inhale 1-2 puffs into the lungs       amLODIPine (NORVASC) 5 MG tablet Take 1 tablet by mouth daily       capsaicin (ZOSTRIX) 0.025 % external cream Apply 1 Application topically       cetirizine (ZYRTEC) 10 MG tablet Take 10 mg by mouth as needed        diazepam (VALIUM) 5 MG tablet Take 5 mg by mouth as needed       diclofenac (VOLTAREN) 1 % topical gel Apply 4 grams to knees up to four times daily as needed.       diphenhydramine-zinc acetate (BENADRYL ITCH RELIEF STICK) 2-0.1 % external stick Apply 1 g topically       FLUoxetine (PROZAC) 40 MG capsule Take 40 mg by mouth daily       fluticasone (FLONASE) 50 MCG/ACT nasal spray Spray 2 sprays in nostril       lisinopril (ZESTRIL) 40 MG tablet Take 1 tablet by mouth daily       LISINOPRIL PO Take 10 mg by mouth daily       meclizine 25 MG CHEW Take 25 mg by mouth 3 times daily       melatonin 1 MG TABS tablet Take 1 mg by mouth       mometasone-formoterol (DULERA) 200-5 MCG/ACT inhaler        naproxen (NAPROSYN) 500 MG tablet Take 500 mg by mouth 2 times daily       phentermine (ADIPEX-P) 37.5 MG tablet 1/2 tab each am 14 tablet 3     topiramate (TOPAMAX) 25 MG tablet 75 mg at bedtime 90 tablet 4     traZODone (DESYREL) 100 MG tablet Take 100 mg by mouth daily       vitamin D (ERGOCALCIFEROL) 69892 UNIT capsule Take 50,000 Units by mouth       acetaminophen (TYLENOL) 500 MG tablet Take 500 mg by mouth       FLUoxetine (PROZAC) 20 MG capsule Take 20 mg by mouth daily         Weight Loss Medication History Reviewed With Patient 2/8/2023   Which weight loss medications are you currently taking on a regular basis?  Phentermine, Topamax (topiramate)   Are you having any side effects from the weight loss medication that we have prescribed you? No  "            Objective    Ht 1.626 m (5' 4\")   Wt 105.7 kg (233 lb)   BMI 39.99 kg/m             Vitals:  No vitals were obtained today due to virtual visit.      PHYSICAL EXAM:  GENERAL: Healthy, alert and no distress  RESP: No audible wheeze, cough.  No increased work of breathing.    NEURO: Mentation and speech appropriate for age.  PSYCH: Mentation appears normal, affect normal/bright, judgement and insight intact, normal speech        Sincerely,    LUIS TALBERT PA-C      25 minutes spent on the date of the encounter doing chart review, history and exam, documentation and further activities per the note  "

## 2023-02-08 NOTE — NURSING NOTE
"(   Chief Complaint   Patient presents with     RECHECK     Return MWM    )    ( Weight: 105.7 kg (233 lb) (Pt reported) )  ( Height: 162.6 cm (5' 4\") )  ( BMI (Calculated): 39.99 )  (   )  (   )  (   )  (   )  (   )  (   )    (   )  (   )  (   )  (   )  (   )  (   )  (   )    (   Patient Active Problem List   Diagnosis     Morbid obesity (H)     Eating disorder     Depression     Knee pain     Back pain     HTN (hypertension)    )  (   Current Outpatient Medications   Medication Sig Dispense Refill     acetaminophen (TYLENOL) 500 MG tablet Take 500 mg by mouth       albuterol (PROAIR HFA/PROVENTIL HFA/VENTOLIN HFA) 108 (90 BASE) MCG/ACT Inhaler Inhale 1-2 puffs into the lungs       buPROPion (WELLBUTRIN XL) 150 MG 24 hr tablet Take 150 mg by mouth       cetirizine (ZYRTEC) 10 MG tablet Take 10 mg by mouth as needed        LISINOPRIL PO Take 10 mg by mouth daily       meclizine 25 MG CHEW Take 25 mg by mouth 3 times daily       melatonin 1 MG TABS tablet Take 1 mg by mouth       phentermine (ADIPEX-P) 37.5 MG tablet 1/2 tab each am 14 tablet 3     topiramate (TOPAMAX) 25 MG tablet 1 tab in evening, increase by 1 tab weekly until taking 75 mg at bedtime 90 tablet 11     vitamin D (ERGOCALCIFEROL) 19553 UNIT capsule Take 50,000 Units by mouth      )  ( Diabetes Eval:    )    ( Pain Eval:  Severe Pain (6) )    ( Wound Eval:       )    (   History   Smoking Status     Former     Packs/day: 0.10     Types: Cigarettes   Smokeless Tobacco     Never    )    ( Signed By:  MADDI Loyd; February 8, 2023; 12:59 PM )    "

## 2023-02-08 NOTE — PROGRESS NOTES
Kameron is a 57 year old who is being evaluated via a billable telephone visit.      What phone number would you like to be contacted at? [phone]  How would you like to obtain your AVS? MyChart  {PROVIDER LOCATION On-site should be selected for visits conducted from your clinic location or adjoining [facility] hospital, academic office, or other nearby [facility] building. Off-site should be selected for all other provider locations, including home:[id number]}  Distant Location (provider location):  Off-site  Phone call duration: *** minutes    During this telephone visit the patient is located in [place], patient verifies this as the location during the entirety of this visit.

## 2023-02-08 NOTE — LETTER
2023       RE: Kameron Park  [address]  [facility] [address]     Dear Colleague,    Thank you for referring your patient, Kameron Park, to the [facility]IGHT MANAGEMENT [facility]INIC [place] at [facility]. Please see a copy of my visit note below.    Kameron is a 57 year old who is being evaluated via a billable telephone visit.      What phone number would you like to be contacted at? [phone]  How would you like to obtain your AVS? MyChart    Distant Location (provider location):  Off-site  Phone call duration: 20 minutes    During this telephone visit the patient is located in [place], patient verifies this as the location during the entirety of this visit.   Return Medical Weight Management Note     Kameron Park  MRN:  [id number]  :  1965  SHOAIB:  2023    Dear Veena Stafford, FILIPE,    I had the pleasure of seeing your patient Kameron Park. She is a 57 year old female who I am continuing to see for treatment of obesity related to:       2022   I have the following health issues associated with obesity: High Blood Pressure, Asthma, Osteoarthritis (joint disease)   I have the following symptoms associated with obesity: Knee Pain, Depression, Back Pain, Fatigue       Assessment & Plan   Problem List Items Addressed This Visit        Digestive    Class 2 severe obesity with serious comorbidity and body mass index (BMI) of 39.0 to 39.9 in adult (H)     Previously seen by Dr. Vallecillo. Last seen 2022. Restarted Topiramate at that time.     Has lost 63lbs since her last visit. She is very happy with these results.     Currently taking Topiramate 75mg at night. Denies any side effects. Is helping with hunger. She would like to continue this dose.     Patient stated she is currently taking Phentermine 37.5mg 1/2 tablet daily. However, from Dr. Vallecillo's note in , phentermine was stopped due to blood pressure not at goal  of <140/90. She did not restart the medication at her last visit either. From chart review it does not look like she is currently being prescribed it from another provider. We will reach out to the pharmacy to confirm. Last saw her PCP in 1/2023 and BP was WNL, however she was just off her BP for the past 2 months. Otherwise, I am not comfortable prescribing Phentermine at this time with her history of uncontrolled HTN and bipolar. Can have further discussion at next visit.          Relevant Medications    topiramate (TOPAMAX) 25 MG tablet      1. Continue Topiramate 75mg at night. Refill Sent.   2. Can discuss restarting Phentermine at next visit.   3. Follow up with Sruthi Cruz in 3 months.     INTERVAL HISTORY:  Dr. Vallecillo 1/11/2022  Started Topiramate   Previously tried phentermine and topirmate     Has lost 62lb since last visit.     Anti-obesity medications:     Current:   Topiramate 75mg - taking at night. No side effects. Has been helpful with hunger. No change in memory.     Phentermine 37.5mg - 1/2 tablet in the morning. No side effects. Has been helpful with hunger. BP is 124/60. No manic episodes. Patient states she is currently taking and is prescribed by out clinic. However, last refill was in 2017 and no records show her PCP is prescribing.     Recent diet changes: Increase in vegetables. Decrease portion sizes. Using Moms Meals, and is helpful with portions. Decrease in fried food. Baking more food.     Recent exercise/activity changes: Decrease with weather. Tries to walk when the sun is out.     Recent stressors: Stable     Recent sleep changes: Stable     Bipolar - mood well controlled on medications. followed by psychiatrist and therapist.     HTN - somewhat controlled. Just restarted her BP medications. Followed by PCP      CURRENT WEIGHT:   233 lbs 0 oz    Initial Weight (lbs): 295 lbs  Last Visits Weight: 133.8 kg (295 lb)  Cumulative weight loss (lbs): 62  Weight Loss Percentage:  21.02%    Changes and Difficulties 2/8/2023   I have made the following changes to my diet since my last visit: Eating less food   With regards to my diet, I am still struggling with: none   I have made the following changes to my activity/exercise since my last visit: not exercising as much   With regards to my activity/exercise, I am still struggling with: not feeling up to exercising         MEDICATIONS:   Current Outpatient Medications   Medication Sig Dispense Refill     albuterol (PROAIR HFA/PROVENTIL HFA/VENTOLIN HFA) 108 (90 BASE) MCG/ACT Inhaler Inhale 1-2 puffs into the lungs       amLODIPine (NORVASC) 5 MG tablet Take 1 tablet by mouth daily       capsaicin (ZOSTRIX) 0.025 % external cream Apply 1 Application topically       cetirizine (ZYRTEC) 10 MG tablet Take 10 mg by mouth as needed        diazepam (VALIUM) 5 MG tablet Take 5 mg by mouth as needed       diclofenac (VOLTAREN) 1 % topical gel Apply 4 grams to knees up to four times daily as needed.       diphenhydramine-zinc acetate (BENADRYL ITCH RELIEF STICK) 2-0.1 % external stick Apply 1 g topically       FLUoxetine (PROZAC) 40 MG capsule Take 40 mg by mouth daily       fluticasone (FLONASE) 50 MCG/ACT nasal spray Spray 2 sprays in nostril       lisinopril (ZESTRIL) 40 MG tablet Take 1 tablet by mouth daily       LISINOPRIL PO Take 10 mg by mouth daily       meclizine 25 MG CHEW Take 25 mg by mouth 3 times daily       melatonin 1 MG TABS tablet Take 1 mg by mouth       mometasone-formoterol (DULERA) 200-5 MCG/ACT inhaler        naproxen (NAPROSYN) 500 MG tablet Take 500 mg by mouth 2 times daily       phentermine (ADIPEX-P) 37.5 MG tablet 1/2 tab each am 14 tablet 3     topiramate (TOPAMAX) 25 MG tablet 75 mg at bedtime 90 tablet 4     traZODone (DESYREL) 100 MG tablet Take 100 mg by mouth daily       vitamin D (ERGOCALCIFEROL) 34913 UNIT capsule Take 50,000 Units by mouth       acetaminophen (TYLENOL) 500 MG tablet Take 500 mg by mouth        "FLUoxetine (PROZAC) 20 MG capsule Take 20 mg by mouth daily         Weight Loss Medication History Reviewed With Patient 2/8/2023   Which weight loss medications are you currently taking on a regular basis?  Phentermine, Topamax (topiramate)   Are you having any side effects from the weight loss medication that we have prescribed you? No             Objective     Ht 1.626 m (5' 4\")   Wt 105.7 kg (233 lb)   BMI 39.99 kg/m             Vitals:  No vitals were obtained today due to virtual visit.      PHYSICAL EXAM:  GENERAL: Healthy, alert and no distress  RESP: No audible wheeze, cough.  No increased work of breathing.    NEURO: Mentation and speech appropriate for age.  PSYCH: Mentation appears normal, affect normal/bright, judgement and insight intact, normal speech        Sincerely,    LUIS TALBERT PA-C      25 minutes spent on the date of the encounter doing chart review, history and exam, documentation and further activities per the note      "

## 2023-02-09 RX ORDER — TOPIRAMATE 25 MG/1
TABLET, FILM COATED ORAL
Qty: 90 TABLET | Refills: 4 | Status: SHIPPED | OUTPATIENT
Start: 2023-02-09 | End: 2023-08-17

## 2023-02-10 ENCOUNTER — TELEPHONE (OUTPATIENT)
Dept: ENDOCRINOLOGY | Facility: CLINIC | Age: 58
End: 2023-02-10
Payer: COMMERCIAL

## 2023-02-10 NOTE — ASSESSMENT & PLAN NOTE
Previously seen by Dr. Vallecillo. Last seen 1/11/2022. Restarted Topiramate at that time.     Has lost 63lbs since her last visit. She is very happy with these results.     Currently taking Topiramate 75mg at night. Denies any side effects. Is helping with hunger. She would like to continue this dose.     Patient stated she is currently taking Phentermine 37.5mg 1/2 tablet daily. However, from Dr. Vallecillo's note in 2017, phentermine was stopped due to blood pressure not at goal of <140/90. She did not restart the medication at her last visit either. From chart review it does not look like she is currently being prescribed it from another provider. We will reach out to the pharmacy to confirm. Last saw her PCP in 1/2023 and BP was WNL, however she was just off her BP for the past 2 months. Otherwise, I am not comfortable prescribing Phentermine at this time with her history of uncontrolled HTN and bipolar. Can have further discussion at next visit.

## 2023-02-10 NOTE — PATIENT INSTRUCTIONS
"Thank you for allowing us the privilege of caring for you. We hope we provided you with the excellent service you deserve.   Please let us know if there is anything else we can do for you so that we can be sure you are completely satisfied with your care experience.    To ensure the quality of our services you may be receiving a patient satisfaction survey from an independent patient satisfaction monitoring company.    The greatest compliment you can give is a \"Likely to Recommend\"    Your visit was with LUIS TALBERT PA-C today.    Instructions per today's visit:     Benjamin Park, it was great to visit with you today.  Here is a review of our visit.  If our clinic scheduler is not able to reach you please call [phone] to schedule your next appointments.    1. Continue Topiramate 75mg at night. Refill Sent.   2. Can discuss restarting Phentermine at next visit.   3. Follow up with Luis Cruz in 3 months.       Information about Video Visits with [facility]: video visit information  _________________________________________________________________________________________________________________________________________________________  If you are asked by your clinic team to have your blood pressure checked:  [facility] do offer several locations for blood pressure checks. Please follow the below link to schedule an appointment. Scheduling an appointment at the pharmacy for a blood pressure check is now preferred.    Appointment Plus (appointment-[email or web address].Xtium)  _________________________________________________________________________________________________________________________________________________________  Important contact and scheduling information:  Please call our contact center at [phone] to schedule your next appointments.  To find a lab location near you, please call [phone].  For any nursing questions or concerns call Ramila James LPN at [phone] or Berta Sykes RN at " [phone]  Please call during clinic hours Monday through Friday 8:00a - 4:00p if you have questions or you can contact us via Cleverhart at anytime and we will reply during clinic hours.    Lab results will be communicated through My Chart or letter (if My Chart not used). Please call the clinic if you have not received communication after 1 week or if you have any questions.?  Clinic Fax: [phone]    _________________________________________________________________________________________________________________________________________________________  Meal Replacement Products:    Here is the link to our new e-store where you can purchase our meal replacement products    [employer] E-Store  Dafiti/store    The one week starter kit is a great way to sample a variety of products and see what works for you.    If you want more information about the product go to: Be At One[employer]    If you are an employee or [facility] or [employer] please contact your care team for a 10% estore discount    Free Shipping for orders over $75     Benefits of meal replacements products:    Portion and calorie control  Improved nutrition  Structured eating  Simplified food choices  Avoid contact with trigger foods  _________________________________________________________________________________________________________________________________________________________  Interested in working with a health ?  Health coaches work with you to improve your overall health and wellbeing.  They look at the whole person, and may involve discussion of different areas of life, including, but not limited to the four pillars of health (sleep, exercise, nutrition, and stress management). Discuss with your care team if you would like to start working a health .  Health Coaching-3 Pack: Schedule by calling [phone]    $99 for three health coaching visits    Visits may  be done in person or via phone    Coaching is a partnership between the  and the client; Coaches do not prescribe or diagnose    Coaching helps inspire the client to reach his/her personal goals   _________________________________________________________________________________________________________________________________________________________  24 Week Healthy Lifestyle Plan:    Our mission in the 24-week Healthy Lifestyle Plan is to provide you with individualized care by giving you the tools, education and support you need to lose weight and maintain a healthy lifestyle. In your 24-week journey, you ll be supported by a dedicated weight loss team that includes registered dietitians, medical weight management providers, health coaches, and nurses -- all with special expertise in weight loss -- to help you every step of the way.     Monthly meetings with your registered dietician or medical weight management provider help to review your progress, update your care plan, and make any adjustments needed to ensure success. Between these visits, weekly and bi-weekly health  visits will help you focus on the four pillars of weight loss -- stress, sleep, nutrition, and exercise -- and how you can best adapt each to achieve sustainable weight loss results.    In addition, you will be given exclusive access to online wellbeing classes through [employer]  Your initial visit will be with a medical weight management provider who will help to understand your weight loss goals and ensure this program is the right fit for you. Please let our team know if you are interested in the 24 week plan by sending a message to your care team or calling [phone] to schedule.  _________________________________________________________________________________________________________________________________________________________    COMPREHENSIVE WEIGHT MANAGEMENT PROGRAM  VIRTUAL SUPPORT GROUPS    For Support Group  "Information:      We offer support groups for patients who are working on weight loss and considering, preparing for or have had weight loss surgery.   There is no cost for this opportunity.  You are invited to attend the?Virtual Support Groups?provided by any of the following locations:    [facility] via Microsoft Teams with Daniela Sarah RN  2.   [facility] via [employer] with Peewee Alaniz, PhD, LP  3.   [facility] via [employer] with Jazmin Garcia RN  4.   [facility] via HOTEL Top-Level Domain Teams with Jazmin Ewing Replaced by [facility]    The following Support Group information can also be found on our website:  [email or web address]    [facility] Weight Loss Surgery Support Group    [facility] Weight Loss Surgery Support Group  The support group is a patient-lead forum that meets monthly to share experiences, encouragement and education. It is open to those who have had weight loss surgery, are scheduled for surgery, and those who are considering surgery.   WHEN: This group meets on the 3rd Wednesday of each month from 5:00PM - 6:00PM virtually using Microsoft Teams.   FACILITATOR: Led by Daniela Sarah RD, LD, RN, the program's Clinical Coordinator.   TO REGISTER: Please contact the clinic via Tier 1 Performance or call the nurse line directly at [phone] to inform our staff that you would like an invite sent to you and to let us know the email you would like the invite sent to. Prior to the meeting, a link with directions on how to join the meeting will be sent to you.    2022 Meetings  Yenny 15: \"Let's Talk\" a time for the group to share.  July 20: \"Let's Talk\" a time for the group to share.  August 17: \"Let's Talk\" a time for the group to share.  September 21: \"Let's Talk\" a time for the group to share.  October 19: Guest Speaker: Dr Bernardino Pickett MD Pulmonologist and Sleep Medicine Physician, \"Getting a Good Night's Sleep\".  November 16: \"Let's " "Talk\" a time for the group to share.  December 21: \"Let's Talk\" a time for the group to share.    [facility] Support Groups    Connections: Bariatric Care Support Group?  This is open to all [facility] (and those external to this program) pre- and post- operative bariatric surgery patients as well as their support system.   WHEN: This group meets the 2nd Tuesday of each month from 6:30 PM - 8:00 PM virtually using Microsoft Teams.   FACILITATOR: Led by Peewee Alaniz, Ph.D who is a Licensed Psychologist with the [facility] Comprehensive Weight Management Program.   TO REGISTER: Please send an email to Peewee Alaniz, Ph.D., LP at?[email or web address]?if you would like an invitation to the group and to learn about using Microsoft Teams.    2022 Meetings  June 14: Leta Gaspar RD, LD at [facility], \"Nutritional Labeling\"  July 12 August 2 (Please Note Date Change)  September 13 October 11 November 8 December 13    Connections: Post-Operative Bariatric Surgery Support Group  This is a support group for [facility] bariatric patients (and those external to [facility]) who have had bariatric surgery and are at least 3 months post-surgery.  WHEN: This support group meets the 4th Wednesday of the month from 11:00 AM - 12:00 PM virtually using Microsoft Teams.   FACILITATOR: Led by Certified Bariatric Nurse, Jazmin Garcia RN.   TO REGISTER: Please send an email to Jazmin at [email or web address] if you would like an invitation to the group and to learn about using Microsoft Teams.    2022 Meetings June 22 July 27 August 24 September 28 October 26 November 23 December 28      [facility] Healthy Lifestyle Virtual Support Group    Healthy Lifestyle Virtual Support Group?  This is 60 minutes of small group guided discussion, support and resources. All are welcome who want a healthy " "lifestyle.  WHEN: This group meets monthly on a Friday from 12:30 PM - 1:30 PM virtually using Microsoft Teams.   FACILITATOR: Led by National Board Certified Health and , Jazmin Ewing [facility].   TO REGISTER: Please send an email to Jazmin [email or web address] to receive monthly invites to the group or if you have any questions about having a health .  Prior to the meeting, a link with directions on how to join the meeting will be sent to you.    2022 Meetings  June 24: Jazmin Ewing [facility], \"Setting Limits and Boundaries\".  Jul 29: Open Forum  August 26: Guest Speaker: Kimi Dodd Registered Dietitian  September 30: Open Forum  October 28th: Guest Speaker: Sara Eubanks [facility], Health , \"Gratitude Practices\".  November 18: Guest Speaker: Amanda Myers RD Registered Dietitian, \"Navigating How to Eat around the Holidays\".  December 16: Guest Speaker: Ana Troung [facility], \"Changing Your Relationship with Movement\".    ____________________________________________________________________________________________________________________________________________________________________________  [place] [facility] Get Moving Program  Our team of physical therapists is trained to help you understand and take control of your condition. They will perform a thorough evaluation to determine your ability for activity and develop a customized plan to fit your goals and physical ability.  Scheduling: Unsure if the Get Moving program is right for you? Discuss the program with your medical provider or diabetes educator. You can also call us at [phone] to ask questions or schedule an appointment.   [facility] Get Moving Program  ____________________________________________________________________________________________________________________________________________________________________________  [facility] Diabetes Prevention Program (DPP)  If you have prediabetes and Medicare " please contact us via BeThereRewards to learn more about the Diabetes Prevention Program (DPP)  Program Details:  [facility] offers the year-long Diabetes Prevention Program (DPP). The program helps you to make lifestyle changes that prevent or delay type 2 diabetes by supporting healthy eating, increased physical activity, stress reduction and use of coping skills.   On average, previous [facility] DPP cohorts have lost and maintained at least 5% of their starting weight throughout the program and averaged more than 150 minutes of physical activity per week.  Participants meet weekly for one-hour group sessions over sixteen weeks, every other week for the next 8 weeks, and monthly for the last six months.   A year-long maintenance program is also available for participants who complete the first year.   Location & Cost:   During the COVID-19 Public Health Emergency, the program is offered virtually. When in-person classes can resume, they will be held at [facility].  For people with Medicare, the program is covered in full. A self-pay option will also be available for those with non-Medicare insurance plans.   _________________________________________________________________________________________________________________________________________________________  Bluetooth Scale:    We hope to provide you with high quality virtual healthcare visits while social distancing for COVID-19 is necessary, as well as in the future when virtual visits may be more convenient for you.     Our technology team made it possible for Bluetooth scales to send weight measurements to our electronic medical record. This allows weights from you weighing at home to securely flow into the medical record, which will improve telephone and virtual visits.   Additionally, studies have shown that adults actually lose more weight when their weights are automatically sent to someone else, and also that  this process is not stressful for those adults.    Below is a link for purchasing the scale, with a discount code for our patients. You may call your insurance company to see if they will reimburse you for the cost of the scale, as a piece of durable medical equipment. The scales only go up to a weight of 400 pounds. This is an issue and we are working with the developer on increasing this. We found no scales that go over 400lb that have blue-tooth for connecting to [employer].    Scale to purchase: the [employer]  Body  Scale: https://www.Show de [email or web address]/us/en/body/shop?gclid=[email or web address]&gclsrc=aw.ds    Discount Code: We have a discount code for our patients to bring the cost down to $50, Discount code is: [place]_Scale_20%off  _______________________________________________________________________________________________________________________________________________________________________________    To work with a Behavioral Health Psychologist:    Call to schedule:    Yossi Alvarez - [phone]  Kieran Metcalf - [phone]  Dodie Schwarz - [phone]  Kamila Rees - [phone]   Lisa Smith PhD (cannot accept Medicare) [phone]        Thank you,   [facility] Comprehensive Weight Management Team

## 2023-02-10 NOTE — TELEPHONE ENCOUNTER
Called pharmacy to verify when Phentermine was last refilled. Records indicate that this hasn't been prescribed since 2017. Patient reported that she is currently taking this. Pharmacist states that there is no Phentermine in their system. They can see medications dated back 18 months, and it is not shown in patient profile. Patient picked up Topiramate yesterday.

## 2023-04-22 ENCOUNTER — HEALTH MAINTENANCE LETTER (OUTPATIENT)
Age: 58
End: 2023-04-22

## 2023-08-14 DIAGNOSIS — E66.812 CLASS 2 SEVERE OBESITY WITH SERIOUS COMORBIDITY AND BODY MASS INDEX (BMI) OF 39.0 TO 39.9 IN ADULT, UNSPECIFIED OBESITY TYPE (H): ICD-10-CM

## 2023-08-14 DIAGNOSIS — E66.01 CLASS 2 SEVERE OBESITY WITH SERIOUS COMORBIDITY AND BODY MASS INDEX (BMI) OF 39.0 TO 39.9 IN ADULT, UNSPECIFIED OBESITY TYPE (H): ICD-10-CM

## 2023-08-17 ENCOUNTER — VIRTUAL VISIT (OUTPATIENT)
Dept: ENDOCRINOLOGY | Facility: CLINIC | Age: 58
End: 2023-08-17
Payer: COMMERCIAL

## 2023-08-17 VITALS — WEIGHT: 200 LBS | HEIGHT: 64 IN | BODY MASS INDEX: 34.15 KG/M2

## 2023-08-17 DIAGNOSIS — E66.01 CLASS 2 SEVERE OBESITY WITH SERIOUS COMORBIDITY AND BODY MASS INDEX (BMI) OF 39.0 TO 39.9 IN ADULT, UNSPECIFIED OBESITY TYPE (H): ICD-10-CM

## 2023-08-17 DIAGNOSIS — E66.812 CLASS 2 SEVERE OBESITY WITH SERIOUS COMORBIDITY AND BODY MASS INDEX (BMI) OF 39.0 TO 39.9 IN ADULT, UNSPECIFIED OBESITY TYPE (H): ICD-10-CM

## 2023-08-17 PROCEDURE — 99442 PR PHYSICIAN TELEPHONE EVALUATION 11-20 MIN: CPT | Mod: 93

## 2023-08-17 RX ORDER — TOPIRAMATE 25 MG/1
TABLET, FILM COATED ORAL
Qty: 90 TABLET | Refills: 4 | OUTPATIENT
Start: 2023-08-17

## 2023-08-17 RX ORDER — TOPIRAMATE 25 MG/1
TABLET, FILM COATED ORAL
Qty: 90 TABLET | Refills: 4 | Status: SHIPPED | OUTPATIENT
Start: 2023-08-17 | End: 2023-12-19

## 2023-08-17 ASSESSMENT — PAIN SCALES - GENERAL: PAINLEVEL: NO PAIN (0)

## 2023-08-17 NOTE — TELEPHONE ENCOUNTER
Refill denied at this time. Patient needs appointment with Sruthi Fournier PA-C. [employer] message sent to patient to schedule appointment.

## 2023-08-17 NOTE — PATIENT INSTRUCTIONS
"Benjamin Melo,  Thank you for allowing us the privilege of caring for you. We hope we provided you with the excellent service you deserve.   Please let us know if there is anything else we can do for you so that we can be sure you are completely satisfied with your care experience.    To ensure the quality of our services you may be receiving a patient satisfaction survey from an independent patient satisfaction monitoring company.    The greatest compliment you can give is a \"Likely to Recommend\"    Your visit was with LUIS TALBERT PA-C today.    Instructions per today's visit:     Continue Topiramate 75mg at night. Refills sent   Follow up with Luis Cruz in 4 months     ___________________________________________________________________________  Important contact and scheduling information:  Please call our contact center at [phone] to schedule your next appointments.  For any nursing questions or concerns call Ramila James LPN at [phone] or Berta Sykes RN at [phone]  Please call during clinic hours Monday through Friday 8:00a - 4:00p if you have questions or you can contact us via MicroTransponder at anytime and we will reply during clinic hours.    Lab results will be communicated through My Chart or letter (if My Chart not used). Please call the clinic if you have not received communication after 1 week or if you have any questions.?  Clinic Fax: [phone]  __________________________________________________________________________    If labs were ordered today:    Please make an appointment to have them drawn at your convenience.     To schedule the Lab Appointment using MicroTransponder:  Select \"Schedule an Appointment\"  Select \"Lab Only\"  For \"A couple of questions\", select \"Other\"  For \"Which locations work for you?, select the location and set up the appointment    To schedule by phone call [phone] to schedule a lab only appointment at [facility] " lab.  ___________________________________________________________________________  Work with A Health !  Virtual Sessions are Available through [facility] Weight Management Clinics    To learn more, call to schedule a free, Health  Q&A appointment: [phone]     What is Health Coaching?  Do you know what you are supposed to do, but you just aren't doing it?  Then, HEALTH COACHING may help you!   Get unstuck and move forward with the support of a professionally trained NBC-HWC (National Board-Certified Health and ) who uses evidence-based approaches to help you move forward with healthy lifestyle changes in the areas of weight loss, stress management and overall well-being.    Health Coaches help you identify goals that will work best for you. Health Coaches provide support and encouragement with overcoming barriers and help you to find inspiration and motivation to lead a healthy lifestyle.    Option one:  Health Coaching 3-Pack; Three, 30-minute Health Coaching Visits, for $99  Visits are done virtually (phone or video)  This is a self pay service; we do not accept insurance for reginaldo coaching.    Option two:   The 24 week Plan; 11 Health Coaching Visits, and a 7 months subscription to [employer]-- on-demand fitness, nutrition and mindfulness classes, for $499 (employee discounts may be available). Participants will also meet regularly with a weight management Medical Provider and a Registered/Licensed Dietician.  This is a self-pay service; we do not accept insurance for health coaching.    To Schedule a free Health  Q&A appointment to learn more,  call [phone].  ____________________________________________________________________    [facility]   Healthy Lifestyle Virtual Support Group    Healthy Lifestyle Virtual Support Group?  This is 60 minutes of small group guided discussion, support and resources. All are welcome who  want a healthy lifestyle.  WHEN: Starting in July 2023, this group meets the 1st Friday of the month from 12:30 PM - 1:30 PM virtually using Microsoft Teams.    FACILITATOR: Led by National Board Certified Health and , Jazmin Ewing [facility].   TO REGISTER: Please send an email to Jazmin [email or web address] to receive monthly invites to the group or if you have any questions about having a health .  Prior to the meeting, a link with directions on how to join the meeting will be sent to you.    2023 Meetings  May 19: Let's Talk  June 9: Create Your Coaching Toolkit: Learn How to  Yourself  July 7: Let's Talk  August 4: Benefits of Fiber with JOSEPH Ventura  September 1: Show and Tell (share your aps, podcasts, recipes, hacks, books)  October 6 :Let's Talk  November 3: Introduction to Mindfulness   December 1: Let's Talk    If you would like bariatric surgery specific support group info please let your care team know.         Thank you,   [facility] Comprehensive Weight Management Team

## 2023-08-17 NOTE — LETTER
2023       RE: Kameron Park  [address]  [facility] [address]     Dear Colleague,    Thank you for referring your patient, Kameron Prak, to the [facility] [place] at [facility]. Please see a copy of my visit note below.    Virtual Visit Details    Type of service:  Telephone Visit   Phone call duration: 12 minutes     Return Medical Weight Management Note     Kameron Park  MRN:  [id number]  :  1965  SHOAIB:  2023    Dear Veena Stafford, FILIPE,    I had the pleasure of seeing your patient Kameron Park. She is a 58 year old female who I am continuing to see for treatment of obesity related to:        2022    12:52 PM   --   I have the following health issues associated with obesity High Blood Pressure    Asthma    Osteoarthritis (joint disease)   I have the following symptoms associated with obesity Knee Pain    Depression    Back Pain    Fatigue       Assessment & Plan  Problem List Items Addressed This Visit          Digestive    Class 2 severe obesity with serious comorbidity and body mass index (BMI) of 39.0 to 39.9 in adult (H)    Relevant Medications    topiramate (TOPAMAX) 25 MG tablet      Continue Topiramate 75mg at night. Refills sent   Follow up with Sruthi rCuz in 4 months     INTERVAL HISTORY:  First seen by Dr. Vallecillo in  - started Topiramate   Starting Weight - 336lbs   Last seen by Dr. Vallecillo 2022  Continued Topiramate   Last seen 23 - continue Topiramate       Anti-obesity medications:     Current:   Topiramate 75mg - taking at night. No side effects. Has been helpful with appetite and hunger.       Recent diet changes: Eating 3 meals a day. Decrease in portion sizes. Continues to be provided with Moms meals. Drinking 75oz water daily.     Recent exercise/activity changes: Walking daily    Recent stressors: No concerns     Recent sleep changes: has improved.          CURRENT WEIGHT:   200 lbs 0 oz    Initial Weight (lbs): 295 lbs  Last Visits Weight: 105.7 kg (233 lb)  Cumulative weight loss (lbs): 95  Weight Loss Percentage: 32.2%    Wt Readings from Last 5 Encounters:   08/17/23 90.7 kg (200 lb)   02/08/23 105.7 kg (233 lb)   01/11/22 133.8 kg (295 lb)   02/06/18 128.7 kg (283 lb 12.8 oz)   10/31/17 134.9 kg (297 lb 6.4 oz)             8/17/2023     4:05 PM   Changes and Difficulties   I have made the following changes to my diet since my last visit: Mom's meals   With regards to my diet, I am still struggling with: Eating a lot of sweets   I have made the following changes to my activity/exercise since my last visit: Walking more   With regards to my activity/exercise, I am still struggling with: Arthritis in knees         MEDICATIONS:   Current Outpatient Medications   Medication Sig Dispense Refill    albuterol (PROAIR HFA/PROVENTIL HFA/VENTOLIN HFA) 108 (90 BASE) MCG/ACT Inhaler Inhale 1-2 puffs into the lungs      capsaicin (ZOSTRIX) 0.025 % external cream Apply 1 Application topically      cetirizine (ZYRTEC) 10 MG tablet Take 10 mg by mouth as needed       diazepam (VALIUM) 5 MG tablet Take 5 mg by mouth as needed      FLUoxetine (PROZAC) 20 MG capsule Take 20 mg by mouth daily      FLUoxetine (PROZAC) 40 MG capsule Take 40 mg by mouth daily      fluticasone (FLONASE) 50 MCG/ACT nasal spray Spray 2 sprays in nostril      lisinopril (ZESTRIL) 40 MG tablet Take 1 tablet by mouth daily      mometasone-formoterol (DULERA) 200-5 MCG/ACT inhaler       naproxen (NAPROSYN) 500 MG tablet Take 500 mg by mouth 2 times daily      topiramate (TOPAMAX) 25 MG tablet 75 mg at bedtime 90 tablet 4    traZODone (DESYREL) 100 MG tablet Take 100 mg by mouth daily             8/17/2023     4:05 PM   Weight Loss Medication History Reviewed With Patient   Which weight loss medications are you currently taking on a regular basis? Topamax (topiramate)   Are you having any side effects  "from the weight loss medication that we have prescribed you? No       Objective   Ht 1.626 m (5' 4\")   Wt 90.7 kg (200 lb)   BMI 34.33 kg/m      Vitals - Patient Reported  Pain Score: No Pain (0)      Vitals:  No vitals were obtained today due to virtual visit.    PHYSICAL EXAM:  GENERAL: Healthy, alert and no distress  EYES: Eyes grossly normal to inspection.  No discharge or erythema, or obvious scleral/conjunctival abnormalities.  RESP: No audible wheeze, cough, or visible cyanosis.  No visible retractions or increased work of breathing.    SKIN: Visible skin clear. No significant rash, abnormal pigmentation or lesions.  NEURO: Cranial nerves grossly intact.  Mentation and speech appropriate for age.  PSYCH: Mentation appears normal, affect normal/bright, judgement and insight intact, normal speech and appearance well-groomed.        Sincerely,    LUIS TALBERT PA-C      18 minutes spent by me on the date of the encounter doing chart review, history and exam, documentation and further activities per the note  "

## 2023-08-17 NOTE — NURSING NOTE
Is the patient currently in the state of [place]? YES    Visit mode:VIDEO    If the visit is dropped, the patient can be reconnected by: VIDEO VISIT: Text to cell phone:   Telephone Information:   Mobile [phone]       Will anyone else be joining the visit? NO  (If patient encounters technical issues they should call [phone])    How would you like to obtain your AVS? MyChart    Are changes needed to the allergy or medication list? No    Reason for visit: RECHECK (LOUANN)    Peggy SILVERIO

## 2023-08-17 NOTE — PROGRESS NOTES
Virtual Visit Details    Type of service:  Telephone Visit   Phone call duration: 12 minutes     Return Medical Weight Management Note     Kameron Park  MRN:  [id number]  :  1965  SHOAIB:  2023    Dear Veena Stafford NP,    I had the pleasure of seeing your patient Kameron Park. She is a 58 year old female who I am continuing to see for treatment of obesity related to:        2022    12:52 PM   --   I have the following health issues associated with obesity High Blood Pressure    Asthma    Osteoarthritis (joint disease)   I have the following symptoms associated with obesity Knee Pain    Depression    Back Pain    Fatigue       Assessment & Plan   Problem List Items Addressed This Visit          Digestive    Class 2 severe obesity with serious comorbidity and body mass index (BMI) of 39.0 to 39.9 in adult (H)    Relevant Medications    topiramate (TOPAMAX) 25 MG tablet      Continue Topiramate 75mg at night. Refills sent   Follow up with Sruthi Cruz in 4 months     INTERVAL HISTORY:  First seen by Dr. Vallecillo in  - started Topiramate   Starting Weight - 336lbs   Last seen by Dr. Vallecillo 2022  Continued Topiramate   Last seen 23 - continue Topiramate       Anti-obesity medications:     Current:   Topiramate 75mg - taking at night. No side effects. Has been helpful with appetite and hunger.       Recent diet changes: Eating 3 meals a day. Decrease in portion sizes. Continues to be provided with Moms meals. Drinking 75oz water daily.     Recent exercise/activity changes: Walking daily    Recent stressors: No concerns     Recent sleep changes: has improved.         CURRENT WEIGHT:   200 lbs 0 oz    Initial Weight (lbs): 295 lbs  Last Visits Weight: 105.7 kg (233 lb)  Cumulative weight loss (lbs): 95  Weight Loss Percentage: 32.2%    Wt Readings from Last 5 Encounters:   23 90.7 kg (200 lb)   23 105.7 kg (233 lb)   22 133.8 kg (295 lb)   18 128.7 kg (283 lb 12.8 oz)  "  10/31/17 134.9 kg (297 lb 6.4 oz)             8/17/2023     4:05 PM   Changes and Difficulties   I have made the following changes to my diet since my last visit: Mom's meals   With regards to my diet, I am still struggling with: Eating a lot of sweets   I have made the following changes to my activity/exercise since my last visit: Walking more   With regards to my activity/exercise, I am still struggling with: Arthritis in knees         MEDICATIONS:   Current Outpatient Medications   Medication Sig Dispense Refill    albuterol (PROAIR HFA/PROVENTIL HFA/VENTOLIN HFA) 108 (90 BASE) MCG/ACT Inhaler Inhale 1-2 puffs into the lungs      capsaicin (ZOSTRIX) 0.025 % external cream Apply 1 Application topically      cetirizine (ZYRTEC) 10 MG tablet Take 10 mg by mouth as needed       diazepam (VALIUM) 5 MG tablet Take 5 mg by mouth as needed      FLUoxetine (PROZAC) 20 MG capsule Take 20 mg by mouth daily      FLUoxetine (PROZAC) 40 MG capsule Take 40 mg by mouth daily      fluticasone (FLONASE) 50 MCG/ACT nasal spray Spray 2 sprays in nostril      lisinopril (ZESTRIL) 40 MG tablet Take 1 tablet by mouth daily      mometasone-formoterol (DULERA) 200-5 MCG/ACT inhaler       naproxen (NAPROSYN) 500 MG tablet Take 500 mg by mouth 2 times daily      topiramate (TOPAMAX) 25 MG tablet 75 mg at bedtime 90 tablet 4    traZODone (DESYREL) 100 MG tablet Take 100 mg by mouth daily             8/17/2023     4:05 PM   Weight Loss Medication History Reviewed With Patient   Which weight loss medications are you currently taking on a regular basis? Topamax (topiramate)   Are you having any side effects from the weight loss medication that we have prescribed you? No       Objective    Ht 1.626 m (5' 4\")   Wt 90.7 kg (200 lb)   BMI 34.33 kg/m      Vitals - Patient Reported  Pain Score: No Pain (0)      Vitals:  No vitals were obtained today due to virtual visit.    PHYSICAL EXAM:  GENERAL: Healthy, alert and no distress  EYES: Eyes " grossly normal to inspection.  No discharge or erythema, or obvious scleral/conjunctival abnormalities.  RESP: No audible wheeze, cough, or visible cyanosis.  No visible retractions or increased work of breathing.    SKIN: Visible skin clear. No significant rash, abnormal pigmentation or lesions.  NEURO: Cranial nerves grossly intact.  Mentation and speech appropriate for age.  PSYCH: Mentation appears normal, affect normal/bright, judgement and insight intact, normal speech and appearance well-groomed.        Sincerely,    LUIS TALBERT PA-C      18 minutes spent by me on the date of the encounter doing chart review, history and exam, documentation and further activities per the note

## 2023-08-25 ENCOUNTER — TELEPHONE (OUTPATIENT)
Dept: ENDOCRINOLOGY | Facility: CLINIC | Age: 58
End: 2023-08-25
Payer: COMMERCIAL

## 2023-08-25 NOTE — TELEPHONE ENCOUNTER
Spoke with patient and scheduled the 4 mo checkout order. Patient is scheduled for a telephone visit with Sruthi Fournier on 12/19/23.

## 2023-12-19 ENCOUNTER — VIRTUAL VISIT (OUTPATIENT)
Dept: ENDOCRINOLOGY | Facility: CLINIC | Age: 58
End: 2023-12-19
Payer: COMMERCIAL

## 2023-12-19 VITALS — WEIGHT: 213 LBS | HEIGHT: 64 IN | BODY MASS INDEX: 36.37 KG/M2

## 2023-12-19 DIAGNOSIS — E66.812 CLASS 2 SEVERE OBESITY WITH SERIOUS COMORBIDITY AND BODY MASS INDEX (BMI) OF 39.0 TO 39.9 IN ADULT, UNSPECIFIED OBESITY TYPE (H): ICD-10-CM

## 2023-12-19 DIAGNOSIS — E66.01 CLASS 2 SEVERE OBESITY WITH SERIOUS COMORBIDITY AND BODY MASS INDEX (BMI) OF 39.0 TO 39.9 IN ADULT, UNSPECIFIED OBESITY TYPE (H): ICD-10-CM

## 2023-12-19 DIAGNOSIS — B37.2 CANDIDAL INTERTRIGO: Primary | ICD-10-CM

## 2023-12-19 DIAGNOSIS — L98.7 EXCESS SKIN OF ABDOMEN: ICD-10-CM

## 2023-12-19 PROCEDURE — 99443 PR PHYSICIAN TELEPHONE EVALUATION 21-30 MIN: CPT | Mod: 93

## 2023-12-19 RX ORDER — TOPIRAMATE 25 MG/1
TABLET, FILM COATED ORAL
Qty: 90 TABLET | Refills: 4 | Status: SHIPPED | OUTPATIENT
Start: 2023-12-19 | End: 2024-03-21

## 2023-12-19 RX ORDER — NYSTATIN 100000 [USP'U]/G
POWDER TOPICAL PRN
Qty: 60 G | Refills: 1 | Status: SHIPPED | OUTPATIENT
Start: 2023-12-19

## 2023-12-19 ASSESSMENT — PATIENT HEALTH QUESTIONNAIRE - PHQ9: SUM OF ALL RESPONSES TO PHQ QUESTIONS 1-9: 15

## 2023-12-19 ASSESSMENT — PAIN SCALES - GENERAL: PAINLEVEL: NO PAIN (0)

## 2023-12-19 NOTE — PROGRESS NOTES
Virtual Visit Details    Type of service:  Telephone Visit   Phone call duration: 15 minutes       Return Medical Weight Management Note     Kameron Park  MRN:  [id number]  :  1965  SHOAIB:  2023    Dear Veena Stafford NP,    I had the pleasure of seeing your patient Kameron Park. She is a 58 year old female who I am continuing to see for treatment of obesity related to:        2022    12:52 PM   --   I have the following health issues associated with obesity High Blood Pressure    Asthma    Osteoarthritis (joint disease)   I have the following symptoms associated with obesity Knee Pain    Depression    Back Pain    Fatigue       Assessment & Plan   Problem List Items Addressed This Visit       Class 2 severe obesity with serious comorbidity and body mass index (BMI) of 39.0 to 39.9 in adult (H)    Relevant Medications    topiramate (TOPAMAX) 25 MG tablet    Other Relevant Orders    Basic metabolic panel     Other Visit Diagnoses       Candidal intertrigo    -  Primary    Relevant Medications    nystatin (MYCOSTATIN) 753098 UNIT/GM external powder    Other Relevant Orders    Adult Plastic Surgery  Referral    Excess skin of abdomen        Relevant Medications    nystatin (MYCOSTATIN) 826656 UNIT/GM external powder    Other Relevant Orders    Adult Plastic Surgery  Referral           Continue Topiramate 75mg at night. Refills sent   Excess skin under panus leading to rashes and sores.   Start Nystatin daily as needed to help prevent sores  Plastic surgery referral placed. Discussed would want to be at goal weight prior to visit.   Follow up with Sruthi Cruz in 4 months     INTERVAL HISTORY:  First seen by Dr. Vallecillo in  - started Topiramate   Starting Weight - 336lbs   Last seen by Dr. Vallecillo 2022  Continued Topiramate   Last seen 23 - continue Topiramate       Has continued to lose weight. Is really happy with results. Has been getting new clothes. Is unsure what  goal weight is, but wants to see how far she can continue to lose.     Anti-obesity medication history    Current:   Topiramate 75mg - No side effects. Has continued to be helpful.     Recent diet changes: Eating 2 meals a day, with some snacks at times. Continues to eat moms meals. Drinking water all day long.     Recent exercise/activity changes: Walking every other day on the treadmill and weight machines in the gym at her complex     Recent stressors: Some increase stress due to daughter being in a car accident. Followed by a therapist, sees them every Thursday. Saw psychiatrist yesterday     Recent sleep changes: Has been harder with increase stress    Vitamins/Labs: Last BMP was 1 year ago.     Excess skin - causes rashes and sores under panus. Tries to keep it as dry possible, but will continue to get it. Would like a plastic surgery referral today    CURRENT WEIGHT:   213 lbs 0 oz    Initial Weight (lbs): 295 lbs  Last Visits Weight: 90.7 kg (200 lb)  Cumulative weight loss (lbs): 82  Weight Loss Percentage: 27.8%    Wt Readings from Last 5 Encounters:   12/19/23 96.6 kg (213 lb)   08/17/23 90.7 kg (200 lb)   02/08/23 105.7 kg (233 lb)   01/11/22 133.8 kg (295 lb)   02/06/18 128.7 kg (283 lb 12.8 oz)             12/19/2023    11:12 AM   Changes and Difficulties   I have made the following changes to my diet since my last visit: no   With regards to my diet, I am still struggling with: losing weight on stomach   I have made the following changes to my activity/exercise since my last visit: more walking, gym exercise   With regards to my activity/exercise, I am still struggling with: nothing         MEDICATIONS:   Current Outpatient Medications   Medication Sig Dispense Refill    nystatin (MYCOSTATIN) 549619 UNIT/GM external powder Apply topically as needed for other (rashes and sores) 60 g 1    topiramate (TOPAMAX) 25 MG tablet 75 mg at bedtime 90 tablet 4    albuterol (PROAIR HFA/PROVENTIL HFA/VENTOLIN HFA)  "108 (90 BASE) MCG/ACT Inhaler Inhale 1-2 puffs into the lungs      capsaicin (ZOSTRIX) 0.025 % external cream Apply 1 Application topically      cetirizine (ZYRTEC) 10 MG tablet Take 10 mg by mouth as needed       diazepam (VALIUM) 5 MG tablet Take 5 mg by mouth as needed      FLUoxetine (PROZAC) 20 MG capsule Take 20 mg by mouth daily      FLUoxetine (PROZAC) 40 MG capsule Take 40 mg by mouth daily      fluticasone (FLONASE) 50 MCG/ACT nasal spray Spray 2 sprays in nostril      lisinopril (ZESTRIL) 40 MG tablet Take 1 tablet by mouth daily      mometasone-formoterol (DULERA) 200-5 MCG/ACT inhaler       naproxen (NAPROSYN) 500 MG tablet Take 500 mg by mouth 2 times daily      traZODone (DESYREL) 100 MG tablet Take 100 mg by mouth daily             12/19/2023    11:12 AM   Weight Loss Medication History Reviewed With Patient   Which weight loss medications are you currently taking on a regular basis? Topamax (topiramate)   Are you having any side effects from the weight loss medication that we have prescribed you? No         Objective    Ht 1.626 m (5' 4.02\")   Wt 96.6 kg (213 lb)   BMI 36.54 kg/m      Vitals - Patient Reported  Pain Score: No Pain (0)      PHYSICAL EXAM:    GENERAL: Healthy, alert and no distress  EYES: Eyes grossly normal to inspection.  No discharge or erythema, or obvious scleral/conjunctival abnormalities.  RESP: No audible wheeze, cough, or visible cyanosis.  No visible retractions or increased work of breathing.    SKIN: Visible skin clear. No significant rash, abnormal pigmentation or lesions.  NEURO: Cranial nerves grossly intact.  Mentation and speech appropriate for age.  PSYCH: Mentation appears normal, affect normal/bright, judgement and insight intact, normal speech and appearance well-groomed.        Sincerely,    LUIS TALBERT PA-C      24 minutes spent by me on the date of the encounter doing chart review, history and exam, documentation and further activities per the " note    Depression Screening Follow-up        12/19/2023    11:07 AM   PHQ   PHQ-9 Total Score 15   Q9: Thoughts of better off dead/self-harm past 2 weeks Not at all       Does the patient currently have a mental health provider?  Yes, patient was referred back to current mental health provider.    LUIS TALBERT PA-C

## 2023-12-19 NOTE — NURSING NOTE
Is the patient currently in the state of [place]? YES    Visit mode:TELEPHONE    If the visit is dropped, the patient can be reconnected by: TELEPHONE VISIT: Phone number: [phone]    Will anyone else be joining the visit? NO  (If patient encounters technical issues they should call [phone] :[id number])    How would you like to obtain your AVS? MyChart    Are changes needed to the allergy or medication list? Pt stated no changes to allergies and Pt stated no med changes    Reason for visit: Follow Up    Iraida Felipe VVF    High PHQ2&9, decline triage nurse, pt stated they are seeing a therapist.

## 2023-12-19 NOTE — PATIENT INSTRUCTIONS
"Benjamin Melo,   Thank you for allowing us the privilege of caring for you. We hope we provided you with the excellent service you deserve.   Please let us know if there is anything else we can do for you so that we can be sure you are completely satisfied with your care experience.    To ensure the quality of our services you may be receiving a patient satisfaction survey from an independent patient satisfaction monitoring company.    The greatest compliment you can give is a \"Likely to Recommend\"    Your visit was with LUIS TALBERT PA-C today.    Instructions per today's visit:     Continue Topiramate 75mg at night. Refills sent   Excess skin under panus leading to rashes and sores.   Start Nystatin daily as needed to help prevent sores  Plastic surgery referral placed. Discussed would want to be at goal weight prior to visit.   Follow up with Luis Cruz in 4 months     ___________________________________________________________________________  Important contact and scheduling information:  Please call our contact center at [phone] to schedule your next appointments.  For any nursing questions or concerns call Ramila James LPN at [phone] or Berta Sykes RN at [phone]  Please call during clinic hours Monday through Friday 8:00a - 4:00p if you have questions or you can contact us via [employer] at anytime and we will reply during clinic hours.    Lab results will be communicated through My Chart or letter (if My Chart not used). Please call the clinic if you have not received communication after 1 week or if you have any questions.?  Clinic Fax: [phone]  __________________________________________________________________________    If labs were ordered today:    Please make an appointment to have them drawn at your convenience.     To schedule the Lab Appointment using [employer]â„¢:  Select \"Schedule an Appointment\"  Select \"Lab Only\"  For \"A couple of questions\", select \"Other\"  For \"Which locations work " for you?, select the location and set up the appointment    To schedule by phone call [phone] to schedule a lab only appointment at any [facility] lab.  ___________________________________________________________________________  Work with A Health !  Virtual Sessions are Available through [facility] Weight Management Clinics    To learn more, call to schedule a free, Health  Q&A appointment: [phone]     What is Health Coaching?  Do you know what you are supposed to do, but you just aren't doing it?  Then, HEALTH COACHING may help you!   Get unstuck and move forward with the support of a professionally trained [employer]-HWC (National Board-Certified Health and ) who uses evidence-based approaches to help you move forward with healthy lifestyle changes in the areas of weight loss, stress management and overall well-being.    Health Coaches help you identify goals that will work best for you. Health Coaches provide support and encouragement with overcoming barriers and help you to find inspiration and motivation to lead a healthy lifestyle.    Option one:  Health Coaching 3-Pack; Three, 30-minute Health Coaching Visits, for $99  Visits are done virtually (phone or video)  This is a self pay service; we do not accept insurance for reginaldo coaching.    Option two:   The 24 week Plan; 11 Health Coaching Visits, and a 7 months subscription to [employer]-- on-demand fitness, nutrition and mindfulness classes, for $499 (employee discounts may be available). Participants will also meet regularly with a weight management Medical Provider and a Registered/Licensed Dietician.  This is a self-pay service; we do not accept insurance for health coaching.    To Schedule a free Health  Q&A appointment to learn more,  call [phone].  ____________________________________________________________________  [facility]  Healthy Lifestyle  "Group    Healthy Lifestyle Group  This is a 60 minute virtual coaching group for those who want to lead a healthier lifestyle. Come together to set goals and overcome barriers in a supportive group environment. We will address the four pillars of health--nutrition, exercise, sleep and emotional well-being.  This group is highly recommended for those who are participating in the 24 week Healthy Lifestyle Plan and our Health Coaching sessions.    WHEN: This group meets the first Friday of the month, 12:30 PM - 1:30 PM online, via a zoom meeting.      FACILITATOR: Led by National Board Certified Health and , Jazmin Ewing, [facility].    TO REGISTER: Please call the Call Center at [phone] to register. You will get an appointment to attend in [facility] [employer]. Fifteen minutes prior to the meeting, complete the e-check in and you will get the link to join the meeting.  There is no charge to attend this group and space is limited.      2023 and 2024 Meeting Topics and Dates:    November 3: Introduction to Mindfulness (Learn simple and effective mindfulness practices and how it can benefit you)    December 8: Let's Talk (guided discussion on our wins and challenges)    January 5: New Years Vision: Manifest your Best 2024! (Guided imagery,  journaling and discussion)    February 2: Let's Talk    March 1: 10 Percent Happier by Hernán Jones (Book Bites; a guided discussion on the nuggets of wisdom from favorite wellness books; no need to read the book but highly encouraged)    April 5: Let's Talk    May 3: \"Essentialism; The Disciplined Pursuit of Less by Monty Lopez (book bites discussion)    June 7: Let's Talk    July 5: NO MEETING, off for the 4th of July Holiday    August 2: The Blue Zones, Secrets for Living a Longer Life by Hernán Bullock (book bites discussion)      If you would like bariatric surgery specific support group info please let your care team know.         Thank you,   SHANE Peguero" Weight Management Team

## 2023-12-19 NOTE — LETTER
2023       RE: Kameron Park  [address]  [facility] [address]     Dear Colleague,    Thank you for referring your patient, Kameron Park, to the [facility] [place] at [facility]. Please see a copy of my visit note below.    Virtual Visit Details    Type of service:  Telephone Visit   Phone call duration: 15 minutes       Return Medical Weight Management Note     Kameron Park  MRN:  [id number]  :  1965  SHOAIB:  2023    Dear Veena Stafford NP,    I had the pleasure of seeing your patient Kameron Park. She is a 58 year old female who I am continuing to see for treatment of obesity related to:        2022    12:52 PM   --   I have the following health issues associated with obesity High Blood Pressure    Asthma    Osteoarthritis (joint disease)   I have the following symptoms associated with obesity Knee Pain    Depression    Back Pain    Fatigue       Assessment & Plan  Problem List Items Addressed This Visit       Class 2 severe obesity with serious comorbidity and body mass index (BMI) of 39.0 to 39.9 in adult (H)    Relevant Medications    topiramate (TOPAMAX) 25 MG tablet    Other Relevant Orders    Basic metabolic panel     Other Visit Diagnoses       Candidal intertrigo    -  Primary    Relevant Medications    nystatin (MYCOSTATIN) 831979 UNIT/GM external powder    Other Relevant Orders    Adult Plastic Surgery  Referral    Excess skin of abdomen        Relevant Medications    nystatin (MYCOSTATIN) 545308 UNIT/GM external powder    Other Relevant Orders    Adult Plastic Surgery  Referral           Continue Topiramate 75mg at night. Refills sent   Excess skin under panus leading to rashes and sores.   Start Nystatin daily as needed to help prevent sores  Plastic surgery referral placed. Discussed would want to be at goal weight prior to visit.   Follow up with Sruthi Cruz in  4 months     INTERVAL HISTORY:  First seen by Dr. Vallecillo in 2015 - started Topiramate   Starting Weight - 336lbs   Last seen by Dr. Vallecillo 1/11/2022  Continued Topiramate   Last seen 2/8/23 - continue Topiramate       Has continued to lose weight. Is really happy with results. Has been getting new clothes. Is unsure what goal weight is, but wants to see how far she can continue to lose.     Anti-obesity medication history    Current:   Topiramate 75mg - No side effects. Has continued to be helpful.     Recent diet changes: Eating 2 meals a day, with some snacks at times. Continues to eat moms meals. Drinking water all day long.     Recent exercise/activity changes: Walking every other day on the treadmill and weight machines in the gym at her complex     Recent stressors: Some increase stress due to daughter being in a car accident. Followed by a therapist, sees them every Thursday. Saw psychiatrist yesterday     Recent sleep changes: Has been harder with increase stress    Vitamins/Labs: Last BMP was 1 year ago.     Excess skin - causes rashes and sores under panus. Tries to keep it as dry possible, but will continue to get it. Would like a plastic surgery referral today    CURRENT WEIGHT:   213 lbs 0 oz    Initial Weight (lbs): 295 lbs  Last Visits Weight: 90.7 kg (200 lb)  Cumulative weight loss (lbs): 82  Weight Loss Percentage: 27.8%    Wt Readings from Last 5 Encounters:   12/19/23 96.6 kg (213 lb)   08/17/23 90.7 kg (200 lb)   02/08/23 105.7 kg (233 lb)   01/11/22 133.8 kg (295 lb)   02/06/18 128.7 kg (283 lb 12.8 oz)             12/19/2023    11:12 AM   Changes and Difficulties   I have made the following changes to my diet since my last visit: no   With regards to my diet, I am still struggling with: losing weight on stomach   I have made the following changes to my activity/exercise since my last visit: more walking, gym exercise   With regards to my activity/exercise, I am still struggling with: nothing  "        MEDICATIONS:   Current Outpatient Medications   Medication Sig Dispense Refill    nystatin (MYCOSTATIN) 222917 UNIT/GM external powder Apply topically as needed for other (rashes and sores) 60 g 1    topiramate (TOPAMAX) 25 MG tablet 75 mg at bedtime 90 tablet 4    albuterol (PROAIR HFA/PROVENTIL HFA/VENTOLIN HFA) 108 (90 BASE) MCG/ACT Inhaler Inhale 1-2 puffs into the lungs      capsaicin (ZOSTRIX) 0.025 % external cream Apply 1 Application topically      cetirizine (ZYRTEC) 10 MG tablet Take 10 mg by mouth as needed       diazepam (VALIUM) 5 MG tablet Take 5 mg by mouth as needed      FLUoxetine (PROZAC) 20 MG capsule Take 20 mg by mouth daily      FLUoxetine (PROZAC) 40 MG capsule Take 40 mg by mouth daily      fluticasone (FLONASE) 50 MCG/ACT nasal spray Spray 2 sprays in nostril      lisinopril (ZESTRIL) 40 MG tablet Take 1 tablet by mouth daily      mometasone-formoterol (DULERA) 200-5 MCG/ACT inhaler       naproxen (NAPROSYN) 500 MG tablet Take 500 mg by mouth 2 times daily      traZODone (DESYREL) 100 MG tablet Take 100 mg by mouth daily             12/19/2023    11:12 AM   Weight Loss Medication History Reviewed With Patient   Which weight loss medications are you currently taking on a regular basis? Topamax (topiramate)   Are you having any side effects from the weight loss medication that we have prescribed you? No         Objective   Ht 1.626 m (5' 4.02\")   Wt 96.6 kg (213 lb)   BMI 36.54 kg/m      Vitals - Patient Reported  Pain Score: No Pain (0)      PHYSICAL EXAM:    GENERAL: Healthy, alert and no distress  EYES: Eyes grossly normal to inspection.  No discharge or erythema, or obvious scleral/conjunctival abnormalities.  RESP: No audible wheeze, cough, or visible cyanosis.  No visible retractions or increased work of breathing.    SKIN: Visible skin clear. No significant rash, abnormal pigmentation or lesions.  NEURO: Cranial nerves grossly intact.  Mentation and speech appropriate for " age.  PSYCH: Mentation appears normal, affect normal/bright, judgement and insight intact, normal speech and appearance well-groomed.        Sincerely,    LUIS TALBERT PA-C      24 minutes spent by me on the date of the encounter doing chart review, history and exam, documentation and further activities per the note    Depression Screening Follow-up        12/19/2023    11:07 AM   PHQ   PHQ-9 Total Score 15   Q9: Thoughts of better off dead/self-harm past 2 weeks Not at all       Does the patient currently have a mental health provider?  Yes, patient was referred back to current mental health provider.    LUIS TALBERT PA-C

## 2023-12-27 NOTE — TELEPHONE ENCOUNTER
FUTURE VISIT INFORMATION      FUTURE VISIT INFORMATION:  Date: 4/12/24  Time: 11:00am  [place] [facility] [place]  REFERRAL INFORMATION:  Reason for visit/diagnosis  Excess skin of abdomen // per pt // ref by Dr Sruthi Fournier // no outside MR     RECORDS REQUESTED FROM:       Clinic name Comments Records Status Imaging Status   [facility] OV/referral 12/19/23 EPIC

## 2024-03-21 ENCOUNTER — MYC REFILL (OUTPATIENT)
Dept: ENDOCRINOLOGY | Facility: CLINIC | Age: 59
End: 2024-03-21
Payer: COMMERCIAL

## 2024-03-21 DIAGNOSIS — E66.01 CLASS 2 SEVERE OBESITY WITH SERIOUS COMORBIDITY AND BODY MASS INDEX (BMI) OF 39.0 TO 39.9 IN ADULT, UNSPECIFIED OBESITY TYPE (H): ICD-10-CM

## 2024-03-21 DIAGNOSIS — E66.812 CLASS 2 SEVERE OBESITY WITH SERIOUS COMORBIDITY AND BODY MASS INDEX (BMI) OF 39.0 TO 39.9 IN ADULT, UNSPECIFIED OBESITY TYPE (H): ICD-10-CM

## 2024-03-21 RX ORDER — TOPIRAMATE 25 MG/1
75 TABLET, FILM COATED ORAL AT BEDTIME
Qty: 90 TABLET | Refills: 0 | Status: SHIPPED | OUTPATIENT
Start: 2024-03-21 | End: 2024-05-01

## 2024-04-12 ENCOUNTER — PRE VISIT (OUTPATIENT)
Dept: PLASTIC SURGERY | Facility: CLINIC | Age: 59
End: 2024-04-12

## 2024-04-15 ENCOUNTER — PATIENT OUTREACH (OUTPATIENT)
Dept: ENDOCRINOLOGY | Facility: CLINIC | Age: 59
End: 2024-04-15

## 2024-04-15 NOTE — PROGRESS NOTES
Kameron has only seen Sruthi Cruz in person, called to check and see if there was some confusion here today. Pt did not answer.     Cecilio Santiago, EMT

## 2024-04-30 DIAGNOSIS — E66.812 CLASS 2 SEVERE OBESITY WITH SERIOUS COMORBIDITY AND BODY MASS INDEX (BMI) OF 39.0 TO 39.9 IN ADULT, UNSPECIFIED OBESITY TYPE (H): ICD-10-CM

## 2024-04-30 DIAGNOSIS — E66.01 CLASS 2 SEVERE OBESITY WITH SERIOUS COMORBIDITY AND BODY MASS INDEX (BMI) OF 39.0 TO 39.9 IN ADULT, UNSPECIFIED OBESITY TYPE (H): ICD-10-CM

## 2024-05-10 NOTE — TELEPHONE ENCOUNTER
topiramate (TOPAMAX) 25 MG tablet   90 tablet 0 3/21/2024       Last Office Visit : 12-  Future Office visit:  10-    Labs completed on : 1-6-2023  COMPREHENSIVE METABOLIC PANEL 14 ([facility])       Potassium ([facility])  3.5 - 5.2 mmol/L 4.0     Creatinine ([facility])  0.57 - 1.00 mg/dL 0.79     Chloride ([facility])  96 - 106 mmol/L 105     NO Anion gap in this grouping    Labs completed on : 10-  [facility]  8 - 16 mEq/L 6 Low

## 2024-05-13 RX ORDER — TOPIRAMATE 25 MG/1
75 TABLET, FILM COATED ORAL AT BEDTIME
Qty: 90 TABLET | Refills: 3 | Status: SHIPPED | OUTPATIENT
Start: 2024-05-13 | End: 2024-09-08

## 2024-05-28 NOTE — TELEPHONE ENCOUNTER
FUTURE VISIT INFORMATION      FUTURE VISIT INFORMATION:  Date: 8/23/24  Time: 11:00am  [place] [facility] [place]FORMATION:  Reason for visit/diagnosis  Excess skin of abdomen // per pt // ref by Dr Sruthi Fournier // no outside MR      RECORDS REQUESTED FROM:         Clinic name Comments Records Status Imaging Status   [facility] OV/referral 12/19/23 EPIC

## 2024-07-19 ENCOUNTER — TELEPHONE (OUTPATIENT)
Dept: PLASTIC SURGERY | Facility: CLINIC | Age: 59
End: 2024-07-19
Payer: COMMERCIAL

## 2024-07-19 NOTE — TELEPHONE ENCOUNTER
Patient confirmed scheduled appointment:  Date: 9/24/24  Time: 9:00 am  Visit type: New Plastic Surgery Long  Provider: Alison Burkett  Location: [facility]  Testing/imaging: n/a  Additional notes: rescheduled 8/23 appt

## 2024-07-22 NOTE — TELEPHONE ENCOUNTER
FUTURE VISIT INFORMATION      FUTURE VISIT INFORMATION:  Date: 9/24/24  Time: 9:00am  [place] [facility] [place]  REFERRAL INFORMATION:  Reason for visit/diagnosis  Excess skin of abdomen // per pt // ref by Dr Sruthi Fournier // no outside MR      RECORDS REQUESTED FROM:         Clinic name Comments Records Status Imaging Status   [facility] OV/referral 12/19/23 EP[facility]

## 2024-08-23 ENCOUNTER — PRE VISIT (OUTPATIENT)
Dept: PLASTIC SURGERY | Facility: CLINIC | Age: 59
End: 2024-08-23

## 2024-09-01 ENCOUNTER — HEALTH MAINTENANCE LETTER (OUTPATIENT)
Age: 59
End: 2024-09-01

## 2024-09-08 DIAGNOSIS — E66.01 CLASS 2 SEVERE OBESITY WITH SERIOUS COMORBIDITY AND BODY MASS INDEX (BMI) OF 39.0 TO 39.9 IN ADULT, UNSPECIFIED OBESITY TYPE (H): ICD-10-CM

## 2024-09-08 DIAGNOSIS — E66.812 CLASS 2 SEVERE OBESITY WITH SERIOUS COMORBIDITY AND BODY MASS INDEX (BMI) OF 39.0 TO 39.9 IN ADULT, UNSPECIFIED OBESITY TYPE (H): ICD-10-CM

## 2024-09-10 RX ORDER — TOPIRAMATE 25 MG/1
75 TABLET, FILM COATED ORAL AT BEDTIME
Qty: 90 TABLET | Refills: 3 | Status: SHIPPED | OUTPATIENT
Start: 2024-09-10

## 2024-09-10 NOTE — TELEPHONE ENCOUNTER
topiramate (TOPAMAX) 25 MG tablet 90 tablet 3 5/13/2024     Last Office Visit: 12/19/23  Future Office visit:   10/17/24      Routing refill request to provider for review/approval because:  Abnormal lab    Mikki Vela RN  [facility] [place] [facility]/Red Flag Triage & Med Refill Team

## 2024-09-23 ENCOUNTER — TELEPHONE (OUTPATIENT)
Dept: PLASTIC SURGERY | Facility: CLINIC | Age: 59
End: 2024-09-23
Payer: COMMERCIAL

## 2024-09-23 NOTE — PROGRESS NOTES
PLASTIC SURGERY HISTORY AND PHYSICAL    Chief Complaint: panniculus, excess skin  Referring Provider: Sruthi Fournier      HPI:   Kameron is a 59 year old female PMH TBI, bipolar, PTSD, depression who presents after weight loss, now with excess skin and persistent rashes.     Highest weight 368lbs, current weight 229lbs. Lost weight with MWM with lifestyle changes, diet/exercise and topomax. Weight fluctuates but she doesn't care as long as it stays under 300lbs.     Complains of itching and sores under the panniculus, has been going on for >5 years. Sometimes tucks a towel under skin to help with moisture. Has tried compression garment to help with excess skin. Complains of sweat under panniculus. Feels that excess skin pulls/causes back pain.     Had back surgery at [facility] in July, s/p Minimally Invasive Transforaminal Lumbar Interbody Fusion Fixation L4-L5. Recovering well.     Has touble with falling since TBI, many years ago, she can't remember when TBI occurred. Walks with walker for support. Her daughter is her PCA, comes 7 days a week for 5 hours, does not live with her.     PMH:   No past medical history on file.  TBI  Bipolar  PTSD  depression    PSH:   No past surgical history on file.  Bilateral carpal tunnel  Breast reduction  Back surgery  Tubal ligation    FH:   No family history on file.     SH:   Social History     Tobacco Use    Smoking status: Former     Current packs/day: 0.10     Types: Cigarettes    Smokeless tobacco: Never    Tobacco comments:     Quit a couple years ago   Vaping Use    Vaping status: Never Used   Substance Use Topics    Alcohol use: Yes   Quit smoking 5 years ago.   Social alcohol    Work/school: disability  [place], lives by herself    MEDS:     Current Outpatient Medications:     albuterol (PROAIR HFA/PROVENTIL HFA/VENTOLIN HFA) 108 (90 BASE) MCG/ACT Inhaler, Inhale 1-2 puffs into the lungs, Disp: , Rfl:     capsaicin (ZOSTRIX) 0.025 % external cream, Apply 1  Application topically, Disp: , Rfl:     diazepam (VALIUM) 5 MG tablet, Take 5 mg by mouth as needed, Disp: , Rfl:     FLUoxetine (PROZAC) 20 MG capsule, Take 20 mg by mouth daily, Disp: , Rfl:     FLUoxetine (PROZAC) 40 MG capsule, Take 40 mg by mouth daily, Disp: , Rfl:     fluticasone (FLONASE) 50 MCG/ACT nasal spray, Spray 2 sprays in nostril, Disp: , Rfl:     methocarbamol (ROBAXIN) 500 MG tablet, Take 1,000 mg by mouth., Disp: , Rfl:     nystatin (MYCOSTATIN) 184669 UNIT/GM external powder, Apply topically as needed for other (rashes and sores), Disp: 60 g, Rfl: 1    SENEXON-S 8.6-50 MG tablet, Take 1-2 tablets by mouth twice a day.*, Disp: , Rfl:     topiramate (TOPAMAX) 25 MG tablet, Take 3 tablets (75 mg) by mouth at bedtime., Disp: 90 tablet, Rfl: 3    traZODone (DESYREL) 100 MG tablet, Take 100 mg by mouth daily, Disp: , Rfl:     mometasone-formoterol (DULERA) 200-5 MCG/ACT inhaler, , Disp: , Rfl:     naproxen (NAPROSYN) 500 MG tablet, Take 500 mg by mouth 2 times daily (Patient not taking: Reported on 9/24/2024), Disp: , Rfl:        ALLERGIES:     Allergies   Allergen Reactions    Fish-Derived Products Anaphylaxis    Seafood Swelling    Beta Adrenergic Blockers Other (See Comments)      Contradiction to allergy shots    Seasonal Allergies      Other reaction(s): Runny Nose        ROS: Denies chest pain, shortness of breath, MI, CVA, DVT, PE, and bleeding disorders.     PHYSICAL EXAMINATION:   /68 (BP Location: Right arm, Patient Position: Sitting, Cuff Size: Adult Large)   Pulse 59   Wt 103.9 kg (229 lb 1.6 oz)   SpO2 99%   BMI 39.31 kg/m     BMI: Body mass index is 39.31 kg/m .  General: No acute distress.   Abdomen: fullness to upper abdomen. Grade II panniculus, covers pubic tubercle and entire mons area. No palpable hernia.      ASSESSMENT:   Kameron is a 59 year old female PMH TBI, bipolar, PTSD, depression who presents after weight loss, now with excess skin and persistent rashes.       PLAN: Kameron is a reasonable candidate for panniculectomy. We had a thorough discussion about the procedure today. I explained what a panniculectomy consists of and what it does not. I explained the difference between a panniculectomy and an abdominoplasty and we dicussed that this is a functional procedure to help with rashes. With a panni the upper belly will remain very similar. We did discuss possibly removing the umbo to remove more skin- she will consider this but is unsure at this time.     I explained the risks to include bleeding, infection, injury to surrounding structures, fluid collection, wound healing difficulties, contour deformity, dog ears, asymmetry, loss of umbilicus, inability to remove all excess tissue, and DVT/PE.     We discussed the recovery period and that this is outpatient surgery, she will need help at home postoperatively. She states her daughter will help her. She would like to proceed with the scheduling/PA process. I explained the PA process in detail.     Photos today. I will place orders.     Alison Burkett PA-C  Plastic and Reconstructive Surgery    55 minutes spent on the date of the encounter doing chart review, history and physical, dressing changes, documentation and further activity as noted above.

## 2024-09-23 NOTE — TELEPHONE ENCOUNTER
No answer, LVM to remind patient of 9 AM appointment tomorrow with Alison Burkett PA-C.    Tod Beebe, EMT

## 2024-09-24 ENCOUNTER — OFFICE VISIT (OUTPATIENT)
Dept: PLASTIC SURGERY | Facility: CLINIC | Age: 59
End: 2024-09-24
Payer: COMMERCIAL

## 2024-09-24 ENCOUNTER — PRE VISIT (OUTPATIENT)
Dept: PLASTIC SURGERY | Facility: CLINIC | Age: 59
End: 2024-09-24

## 2024-09-24 VITALS
HEART RATE: 59 BPM | OXYGEN SATURATION: 99 % | DIASTOLIC BLOOD PRESSURE: 68 MMHG | SYSTOLIC BLOOD PRESSURE: 131 MMHG | BODY MASS INDEX: 39.31 KG/M2 | WEIGHT: 229.1 LBS

## 2024-09-24 DIAGNOSIS — B37.2 CANDIDAL INTERTRIGO: ICD-10-CM

## 2024-09-24 DIAGNOSIS — L98.7 EXCESS SKIN OF ABDOMEN: Primary | ICD-10-CM

## 2024-09-24 PROCEDURE — 99204 OFFICE O/P NEW MOD 45 MIN: CPT | Performed by: PHYSICIAN ASSISTANT

## 2024-09-24 RX ORDER — METHOCARBAMOL 500 MG/1
1000 TABLET, FILM COATED ORAL
COMMUNITY
Start: 2024-09-18

## 2024-09-24 RX ORDER — DOCUSATE SODIUM 50MG AND SENNOSIDES 8.6MG 8.6; 5 MG/1; MG/1
TABLET, FILM COATED ORAL
COMMUNITY

## 2024-09-24 ASSESSMENT — PAIN SCALES - GENERAL: PAINLEVEL: NO PAIN (0)

## 2024-09-24 NOTE — LETTER
9/24/2024       RE: Kameron Park  [address]  [facility] [address]     Dear Colleague,    Thank you for referring your patient, Kameron Park, to the [facility] [place] at [facility]. Please see a copy of my visit note below.    PLASTIC SURGERY HISTORY AND PHYSICAL    Chief Complaint: panniculus, excess skin  Referring Provider: Sruthi Fournier      HPI:   Kameron is a 59 year old female PMH TBI, bipolar, PTSD, depression who presents after weight loss, now with excess skin and persistent rashes.     Highest weight 368lbs, current weight 229lbs. Lost weight with MWM with lifestyle changes, diet/exercise and topomax. Weight fluctuates but she doesn't care as long as it stays under 300lbs.     Complains of itching and sores under the panniculus, has been going on for >5 years. Sometimes tucks a towel under skin to help with moisture. Has tried compression garment to help with excess skin. Complains of sweat under panniculus. Feels that excess skin pulls/causes back pain.     Had back surgery at [facility] in July, s/p Minimally Invasive Transforaminal Lumbar Interbody Fusion Fixation L4-L5. Recovering well.     Has touble with falling since TBI, many years ago, she can't remember when TBI occurred. Walks with walker for support. Her daughter is her PCA, comes 7 days a week for 5 hours, does not live with her.     PMH:   No past medical history on file.  TBI  Bipolar  PTSD  depression    PSH:   No past surgical history on file.  Bilateral carpal tunnel  Breast reduction  Back surgery  Tubal ligation    FH:   No family history on file.     SH:   Social History     Tobacco Use     Smoking status: Former     Current packs/day: 0.10     Types: Cigarettes     Smokeless tobacco: Never     Tobacco comments:     Quit a couple years ago   Vaping Use     Vaping status: Never Used   Substance Use Topics      Alcohol use: Yes   Quit smoking 5 years ago.   Social alcohol    Work/school: disability  [place], lives by herself    MEDS:     Current Outpatient Medications:      albuterol (PROAIR HFA/PROVENTIL HFA/VENTOLIN HFA) 108 (90 BASE) MCG/ACT Inhaler, Inhale 1-2 puffs into the lungs, Disp: , Rfl:      capsaicin (ZOSTRIX) 0.025 % external cream, Apply 1 Application topically, Disp: , Rfl:      diazepam (VALIUM) 5 MG tablet, Take 5 mg by mouth as needed, Disp: , Rfl:      FLUoxetine (PROZAC) 20 MG capsule, Take 20 mg by mouth daily, Disp: , Rfl:      FLUoxetine (PROZAC) 40 MG capsule, Take 40 mg by mouth daily, Disp: , Rfl:      fluticasone (FLONASE) 50 MCG/ACT nasal spray, Spray 2 sprays in nostril, Disp: , Rfl:      methocarbamol (ROBAXIN) 500 MG tablet, Take 1,000 mg by mouth., Disp: , Rfl:      nystatin (MYCOSTATIN) 753977 UNIT/GM external powder, Apply topically as needed for other (rashes and sores), Disp: 60 g, Rfl: 1     SENEXON-S 8.6-50 MG tablet, Take 1-2 tablets by mouth twice a day.*, Disp: , Rfl:      topiramate (TOPAMAX) 25 MG tablet, Take 3 tablets (75 mg) by mouth at bedtime., Disp: 90 tablet, Rfl: 3     traZODone (DESYREL) 100 MG tablet, Take 100 mg by mouth daily, Disp: , Rfl:      mometasone-formoterol (DULERA) 200-5 MCG/ACT inhaler, , Disp: , Rfl:      naproxen (NAPROSYN) 500 MG tablet, Take 500 mg by mouth 2 times daily (Patient not taking: Reported on 9/24/2024), Disp: , Rfl:        ALLERGIES:     Allergies   Allergen Reactions     Fish-Derived Products Anaphylaxis     Seafood Swelling     Beta Adrenergic Blockers Other (See Comments)      Contradiction to allergy shots     Seasonal Allergies      Other reaction(s): Runny Nose        ROS: Denies chest pain, shortness of breath, MI, CVA, DVT, PE, and bleeding disorders.     PHYSICAL EXAMINATION:   /68 (BP Location: Right arm, Patient Position: Sitting, Cuff Size: Adult Large)   Pulse 59   Wt 103.9 kg (229 lb 1.6 oz)   SpO2 99%   BMI 39.31  kg/m     BMI: Body mass index is 39.31 kg/m .  General: No acute distress.   Abdomen: fullness to upper abdomen. Grade II panniculus, covers pubic tubercle and entire mons area. No palpable hernia.      ASSESSMENT:   Kameron is a 59 year old female PMH TBI, bipolar, PTSD, depression who presents after weight loss, now with excess skin and persistent rashes.      PLAN: Kameron is a reasonable candidate for panniculectomy. We had a thorough discussion about the procedure today. I explained what a panniculectomy consists of and what it does not. I explained the difference between a panniculectomy and an abdominoplasty and we dicussed that this is a functional procedure to help with rashes. With a panni the upper belly will remain very similar. We did discuss possibly removing the umbo to remove more skin- she will consider this but is unsure at this time.     I explained the risks to include bleeding, infection, injury to surrounding structures, fluid collection, wound healing difficulties, contour deformity, dog ears, asymmetry, loss of umbilicus, inability to remove all excess tissue, and DVT/PE.     We discussed the recovery period and that this is outpatient surgery, she will need help at home postoperatively. She states her daughter will help her. She would like to proceed with the scheduling/PA process. I explained the PA process in detail.     Photos today. I will place orders.     Alison Burkett PA-C  Plastic and Reconstructive Surgery    55 minutes spent on the date of the encounter doing chart review, history and physical, dressing changes, documentation and further activity as noted above.      Again, thank you for allowing me to participate in the care of your patient.      Sincerely,    Alison Burkett PA-C

## 2024-09-24 NOTE — NURSING NOTE
Chief Complaint   Patient presents with    Consult     Excess skin removal.       Vitals:    09/24/24 0858   BP: 131/68   BP Location: Right arm   Patient Position: Sitting   Cuff Size: Adult Large   Pulse: 59   SpO2: 99%   Weight: 103.9 kg (229 lb 1.6 oz)       Body mass index is 39.31 kg/m .      Tod Beebe, EMT

## 2024-09-24 NOTE — PATIENT INSTRUCTIONS
Summary of surgical consultation with plastic surgery and next steps:       Now that we are planning surgery the next steps are as follows:    1.) Surgery orders will be placed by the provider you saw for a consult.    2.) Once these orders are placed the surgery scheduler will contact you to offer available surgery dates (this can be 1-2 weeks from the consult before you hear from scheduling, depending on how busy the surgery scheduler is).    3.) If you have requested quotes for your surgery to know how much it will cost out of pocket, this will be given to you by the finance department within about 1 week of the consult.    4.) Prior Authorization to ask insurance if they will cover the surgery does not get started until 30 days before the surgery date. You will know the outcome of this prior to the surgery date, but it may be close to the surgery when you learn the outcome. If you plan to proceed with surgery regardless of prior auth outcome, please let us know. It is helpful for us to know if you plan to proceed even if you have to pay out of pocket.    5.) If the prior authorization is denied, you have the option to pay out of pocket up front in full for the cost of the surgery. There are no payment plan options available. This payment would be collected from you about 1 week before the surgery date.    6.) If your prior authorization is denied, and the reason for denial is something that you can fulfill (example they require that you have tried physical therapy and you have done this, it is possible to provide those records to us and re-submit/appeal). If the reason for denial is not something you can fulfill, surgery should be cancelled and you can obtain the required item, and schedule surgery again to see if it gets approved.    7.) In order to understand what your specific insurance company requires, you should call them and ask so that you know what types of approval and denial criteria will be applied  to your case.     8.) If you have outside records that fulfill the insurance criteria, please send them to the provider you saw in consultation via [employer] (as an attachment), so that they can be submitted along with the prior auth request.

## 2024-09-25 RX ORDER — CEFAZOLIN SODIUM 2 G/50ML
2 SOLUTION INTRAVENOUS SEE ADMIN INSTRUCTIONS
OUTPATIENT
Start: 2024-09-25

## 2024-09-25 RX ORDER — CEFAZOLIN SODIUM 2 G/50ML
2 SOLUTION INTRAVENOUS
OUTPATIENT
Start: 2024-09-25

## 2024-09-27 ENCOUNTER — TELEPHONE (OUTPATIENT)
Dept: PLASTIC SURGERY | Facility: CLINIC | Age: 59
End: 2024-09-27
Payer: COMMERCIAL

## 2024-09-27 PROBLEM — L98.7 EXCESS SKIN OF ABDOMEN: Status: ACTIVE | Noted: 2024-09-24

## 2024-09-27 NOTE — TELEPHONE ENCOUNTER
Called to schedule surgery with: Dr. Camarillo    Spoke with: Kameron     Date of Surgery: 2/12/25    Estimated Arrival time Discussed with Patient:  No    Location of surgery: [facility]    Pre-operative H&P: patient confirmed they will schedule with primary care clinic    Pre-surgical Consult: Yes 1/15 at 9:00 AM    Additional Appointments: N/A    Post-operative Appointment w/KATELYN or Surgeon: Kandice Tobar PA-C 2/21 at 11:40 AM    Discussed with patient pre-op RN will call 2-3 days prior to surgery with arrival time and instructions:  Yes     Standard Surgery Packet: Yes to be sent via US mail    Additional Comments: N/A    All patients questions were answered and was instructed to contact the clinic with any questions or concerns.       Loyda Briggs on 9/27/2024 at 2:31 PM

## 2024-09-27 NOTE — TELEPHONE ENCOUNTER
Left voicemail regarding surgery scheduling. Provided direct line for patient to call to discuss.     P: [phone]      Loyda Briggs on 9/27/2024 at 10:40 AM

## 2024-10-17 ENCOUNTER — OFFICE VISIT (OUTPATIENT)
Dept: ENDOCRINOLOGY | Facility: CLINIC | Age: 59
End: 2024-10-17
Payer: COMMERCIAL

## 2024-10-17 VITALS
BODY MASS INDEX: 37.05 KG/M2 | HEART RATE: 67 BPM | SYSTOLIC BLOOD PRESSURE: 125 MMHG | HEIGHT: 64 IN | DIASTOLIC BLOOD PRESSURE: 75 MMHG | WEIGHT: 217 LBS | OXYGEN SATURATION: 100 %

## 2024-10-17 DIAGNOSIS — E66.01 CLASS 2 SEVERE OBESITY WITH SERIOUS COMORBIDITY AND BODY MASS INDEX (BMI) OF 39.0 TO 39.9 IN ADULT, UNSPECIFIED OBESITY TYPE (H): ICD-10-CM

## 2024-10-17 DIAGNOSIS — E66.812 CLASS 2 SEVERE OBESITY WITH SERIOUS COMORBIDITY AND BODY MASS INDEX (BMI) OF 39.0 TO 39.9 IN ADULT, UNSPECIFIED OBESITY TYPE (H): ICD-10-CM

## 2024-10-17 PROCEDURE — 99213 OFFICE O/P EST LOW 20 MIN: CPT

## 2024-10-17 PROCEDURE — G2211 COMPLEX E/M VISIT ADD ON: HCPCS

## 2024-10-17 RX ORDER — TOPIRAMATE 25 MG/1
75 TABLET, FILM COATED ORAL AT BEDTIME
Qty: 90 TABLET | Refills: 3 | Status: SHIPPED | OUTPATIENT
Start: 2024-10-17

## 2024-10-17 ASSESSMENT — PAIN SCALES - GENERAL: PAINLEVEL: NO PAIN (0)

## 2024-10-17 NOTE — LETTER
10/17/2024       RE: Kameron Park  [address]  [facility] [address]     Dear Colleague,    Thank you for referring your patient, Kameron Park, to the [facility] WEIGHT MANAGEMENT CLINIC [place] at [facility]. Please see a copy of my visit note below.      Return Medical Weight Management Note     Kameron Park  MRN:  [id number]  :  1965  SHOAIB:  10/17/2024    Dear Veena Stafford NP,    I had the pleasure of seeing your patient Kameron Park. She is a 59 year old female who I am continuing to see for treatment of obesity related to:        2022    12:52 PM   --   I have the following health issues associated with obesity High Blood Pressure    Asthma    Osteoarthritis (joint disease)   I have the following symptoms associated with obesity Knee Pain    Depression    Back Pain    Fatigue       Assessment & Plan  Problem List Items Addressed This Visit       Class 2 severe obesity with serious comorbidity and body mass index (BMI) of 39.0 to 39.9 in adult (H)    Relevant Medications    topiramate (TOPAMAX) 25 MG tablet    Other Relevant Orders    Adult Bariatrics and Weight Management Clinic Follow-Up Order      Continue Topiramate 75mg at night. Refills sent.   Sruthi Cruz in 3 months     INTERVAL HISTORY:  First seen by Dr. Vallecillo in  - started Topiramate   Starting Weight - 336lbs   Last seen by Dr. Vallecillo 2022  Continued Topiramate       Since last visit went through emergent back surgery due to spinal stenosis. Has recovered well. This really limited her mobility, and saw some weight regain with this. Has been able to be more active again, and weight has been lost again. Overall continues to be really happy with her results.     Anti-obesity medication history    Current:   Topiramate 75mg - continues to be helpful. No side effects. Is not interested in discussion any medication changes today. Is happy with her current  regiment.       Recent diet changes: Continues to have mom meals for breakfast and lunch. Daughter has been making her dinners - all organic. Moms meals gives her snacks, but she typically doesn't have it. Will have popcorn at times, has been craving it more. 80oz of water daily. Minimal juice through moms meals.     Recent exercise/activity changes: has been limited due to back pain from surgery. Has been doing PT.     Recent stressors: trying to keep track of appts.     Recent sleep changes: ok as long as takes medications     Vitamins/Labs: GFR >60 7/19/2024    Has excess skin removal surgery scheduled 2/12/2025. She is really excited to have this done!     CURRENT WEIGHT:   217 lbs 0 oz    Initial Weight (lbs): 295 lbs  Last Visits Weight: 96.6 kg (213 lb)  Cumulative weight loss (lbs): 78  Weight Loss Percentage: 26.44%    Wt Readings from Last 5 Encounters:   10/17/24 98.4 kg (217 lb)   09/24/24 103.9 kg (229 lb 1.6 oz)   12/19/23 96.6 kg (213 lb)   08/17/23 90.7 kg (200 lb)   02/08/23 105.7 kg (233 lb)             10/14/2024    10:26 AM   Changes and Difficulties   I have made the following changes to my diet since my last visit: Yes, I eat less   With regards to my diet, I am still struggling with: Still eating little too much sweets   I have made the following changes to my activity/exercise since my last visit: Walking   With regards to my activity/exercise, I am still struggling with: Learning to do things different because of my back surgery         MEDICATIONS:   Current Outpatient Medications   Medication Sig Dispense Refill     albuterol (PROAIR HFA/PROVENTIL HFA/VENTOLIN HFA) 108 (90 BASE) MCG/ACT Inhaler Inhale 1-2 puffs into the lungs       capsaicin (ZOSTRIX) 0.025 % external cream Apply 1 Application topically       diazepam (VALIUM) 5 MG tablet Take 5 mg by mouth as needed       FLUoxetine (PROZAC) 20 MG capsule Take 20 mg by mouth daily       FLUoxetine (PROZAC) 40 MG capsule Take 40 mg by  "mouth daily       fluticasone (FLONASE) 50 MCG/ACT nasal spray Spray 2 sprays in nostril       methocarbamol (ROBAXIN) 500 MG tablet Take 1,000 mg by mouth.       nystatin (MYCOSTATIN) 066534 UNIT/GM external powder Apply topically as needed for other (rashes and sores) 60 g 1     SENEXON-S 8.6-50 MG tablet Take 1-2 tablets by mouth twice a day.*       topiramate (TOPAMAX) 25 MG tablet Take 3 tablets (75 mg) by mouth at bedtime. 90 tablet 3     traZODone (DESYREL) 100 MG tablet Take 100 mg by mouth daily       mometasone-formoterol (DULERA) 200-5 MCG/ACT inhaler  (Patient not taking: Reported on 9/24/2024)       naproxen (NAPROSYN) 500 MG tablet Take 500 mg by mouth 2 times daily (Patient not taking: Reported on 9/24/2024)             10/14/2024    10:26 AM   Weight Loss Medication History Reviewed With Patient   Which weight loss medications are you currently taking on a regular basis? Topamax (topiramate)   Are you having any side effects from the weight loss medication that we have prescribed you? No     Objective   /75 (BP Location: Left arm, Patient Position: Sitting, Cuff Size: Adult Large)   Pulse 67   Ht 1.626 m (5' 4\")   Wt 98.4 kg (217 lb)   SpO2 100%   BMI 37.25 kg/m        GENERAL: alert and no distress  EYES: Eyes grossly normal to inspection.  No discharge or erythema, or obvious scleral/conjunctival abnormalities.  RESP: No audible wheeze, cough, or visible cyanosis.    SKIN: Visible skin clear. No significant rash, abnormal pigmentation or lesions.  NEURO: Cranial nerves grossly intact.  Mentation and speech appropriate for age.  PSYCH: Appropriate affect, tone, and pace of words        Sincerely,    Sruthi Fournier PA-C      20 minutes spent by me on the date of the encounter doing chart review, history and exam, documentation and further activities per the note    The longitudinal plan of care for the diagnosis(es)/condition(s) as documented were addressed during this visit. Due to the " added complexity in care, I will continue to support [facility] in the subsequent management and with ongoing continuity of care.      Again, thank you for allowing me to participate in the care of your patient.      Sincerely,    Sruthi Fournier PA-C

## 2024-10-17 NOTE — PROGRESS NOTES
Return Medical Weight Management Note     Kameron Park  MRN:  [id number]  :  1965  SHOAIB:  10/17/2024    Dear Veena Stafford NP,    I had the pleasure of seeing your patient Kameron Park. She is a 59 year old female who I am continuing to see for treatment of obesity related to:        2022    12:52 PM   --   I have the following health issues associated with obesity High Blood Pressure    Asthma    Osteoarthritis (joint disease)   I have the following symptoms associated with obesity Knee Pain    Depression    Back Pain    Fatigue       Assessment & Plan   Problem List Items Addressed This Visit       Class 2 severe obesity with serious comorbidity and body mass index (BMI) of 39.0 to 39.9 in adult (H)    Relevant Medications    topiramate (TOPAMAX) 25 MG tablet    Other Relevant Orders    Adult Bariatrics and Weight Management Clinic Follow-Up Order      Continue Topiramate 75mg at night. Refills sent.   Sruthi Cruz in 3 months     INTERVAL HISTORY:  First seen by Dr. Vallecillo in  - started Topiramate   Starting Weight - 336lbs   Last seen by Dr. Vallecillo 2022  Continued Topiramate       Since last visit went through emergent back surgery due to spinal stenosis. Has recovered well. This really limited her mobility, and saw some weight regain with this. Has been able to be more active again, and weight has been lost again. Overall continues to be really happy with her results.     Anti-obesity medication history    Current:   Topiramate 75mg - continues to be helpful. No side effects. Is not interested in discussion any medication changes today. Is happy with her current regiment.       Recent diet changes: Continues to have mom meals for breakfast and lunch. Daughter has been making her dinners - all organic. Moms meals gives her snacks, but she typically doesn't have it. Will have popcorn at times, has been craving it more. 80oz of water daily. Minimal juice through moms meals.     Recent  exercise/activity changes: has been limited due to back pain from surgery. Has been doing PT.     Recent stressors: trying to keep track of appts.     Recent sleep changes: ok as long as takes medications     Vitamins/Labs: GFR >60 7/19/2024    Has excess skin removal surgery scheduled 2/12/2025. She is really excited to have this done!     CURRENT WEIGHT:   217 lbs 0 oz    Initial Weight (lbs): 295 lbs  Last Visits Weight: 96.6 kg (213 lb)  Cumulative weight loss (lbs): 78  Weight Loss Percentage: 26.44%    Wt Readings from Last 5 Encounters:   10/17/24 98.4 kg (217 lb)   09/24/24 103.9 kg (229 lb 1.6 oz)   12/19/23 96.6 kg (213 lb)   08/17/23 90.7 kg (200 lb)   02/08/23 105.7 kg (233 lb)             10/14/2024    10:26 AM   Changes and Difficulties   I have made the following changes to my diet since my last visit: Yes, I eat less   With regards to my diet, I am still struggling with: Still eating little too much sweets   I have made the following changes to my activity/exercise since my last visit: Walking   With regards to my activity/exercise, I am still struggling with: Learning to do things different because of my back surgery         MEDICATIONS:   Current Outpatient Medications   Medication Sig Dispense Refill    albuterol (PROAIR HFA/PROVENTIL HFA/VENTOLIN HFA) 108 (90 BASE) MCG/ACT Inhaler Inhale 1-2 puffs into the lungs      capsaicin (ZOSTRIX) 0.025 % external cream Apply 1 Application topically      diazepam (VALIUM) 5 MG tablet Take 5 mg by mouth as needed      FLUoxetine (PROZAC) 20 MG capsule Take 20 mg by mouth daily      FLUoxetine (PROZAC) 40 MG capsule Take 40 mg by mouth daily      fluticasone (FLONASE) 50 MCG/ACT nasal spray Spray 2 sprays in nostril      methocarbamol (ROBAXIN) 500 MG tablet Take 1,000 mg by mouth.      nystatin (MYCOSTATIN) 689747 UNIT/GM external powder Apply topically as needed for other (rashes and sores) 60 g 1    SENEXON-S 8.6-50 MG tablet Take 1-2 tablets by mouth  "twice a day.*      topiramate (TOPAMAX) 25 MG tablet Take 3 tablets (75 mg) by mouth at bedtime. 90 tablet 3    traZODone (DESYREL) 100 MG tablet Take 100 mg by mouth daily      mometasone-formoterol (DULERA) 200-5 MCG/ACT inhaler  (Patient not taking: Reported on 9/24/2024)      naproxen (NAPROSYN) 500 MG tablet Take 500 mg by mouth 2 times daily (Patient not taking: Reported on 9/24/2024)             10/14/2024    10:26 AM   Weight Loss Medication History Reviewed With Patient   Which weight loss medications are you currently taking on a regular basis? Topamax (topiramate)   Are you having any side effects from the weight loss medication that we have prescribed you? No     Objective    /75 (BP Location: Left arm, Patient Position: Sitting, Cuff Size: Adult Large)   Pulse 67   Ht 1.626 m (5' 4\")   Wt 98.4 kg (217 lb)   SpO2 100%   BMI 37.25 kg/m        GENERAL: alert and no distress  EYES: Eyes grossly normal to inspection.  No discharge or erythema, or obvious scleral/conjunctival abnormalities.  RESP: No audible wheeze, cough, or visible cyanosis.    SKIN: Visible skin clear. No significant rash, abnormal pigmentation or lesions.  NEURO: Cranial nerves grossly intact.  Mentation and speech appropriate for age.  PSYCH: Appropriate affect, tone, and pace of words        Sincerely,    Sruthi Fournier PA-C      20 minutes spent by me on the date of the encounter doing chart review, history and exam, documentation and further activities per the note    The longitudinal plan of care for the diagnosis(es)/condition(s) as documented were addressed during this visit. Due to the added complexity in care, I will continue to support [facility] in the subsequent management and with ongoing continuity of care.  "

## 2024-10-17 NOTE — NURSING NOTE
"(   Chief Complaint   Patient presents with    RECHECK     Follow-up.    )    ( Weight: 98.4 kg (217 lb) )  ( Height: 162.6 cm (5' 4\") )  ( BMI (Calculated): 37.25 )  (   )  ( Cumulative weight loss (lbs): 78 )  ( Last Visits Weight: 96.6 kg (213 lb) )  ( Wt change since last visit (lbs): 4 )  (   )  (   )    ( BP: 125/75 )  (   )  (   )  (   )  ( Pulse: 67 )  (   )  ( SpO2: 100 % )    (   Patient Active Problem List   Diagnosis    Class 2 severe obesity with serious comorbidity and body mass index (BMI) of 39.0 to 39.9 in adult (H)    Eating disorder    Depression    Knee pain    Back pain    HTN (hypertension)    Allergic conjunctivitis and rhinitis, bilateral    Bilateral primary osteoarthritis of knee    Bipolar affective disorder, manic (H)    Borderline personality disorder (H)    Carpal tunnel syndrome    Cholelithiases    Chronic pain of both shoulders    Cocaine use disorder, severe, dependence (H)    De Quervain's tenosynovitis, left    Headache syndrome    Moderate persistent asthma without complication    Myopia of both eyes with astigmatism and presbyopia    Osteoarthrosis    Normocytic anemia    Plantar fasciitis of left foot    Posttraumatic stress disorder    Traumatic brain injury (H)    Vitamin D deficiency    Excess skin of abdomen    )  (   Current Outpatient Medications   Medication Sig Dispense Refill    albuterol (PROAIR HFA/PROVENTIL HFA/VENTOLIN HFA) 108 (90 BASE) MCG/ACT Inhaler Inhale 1-2 puffs into the lungs      capsaicin (ZOSTRIX) 0.025 % external cream Apply 1 Application topically      diazepam (VALIUM) 5 MG tablet Take 5 mg by mouth as needed      FLUoxetine (PROZAC) 20 MG capsule Take 20 mg by mouth daily      FLUoxetine (PROZAC) 40 MG capsule Take 40 mg by mouth daily      fluticasone (FLONASE) 50 MCG/ACT nasal spray Spray 2 sprays in nostril      methocarbamol (ROBAXIN) 500 MG tablet Take 1,000 mg by mouth.      nystatin (MYCOSTATIN) 799376 UNIT/GM external powder Apply topically " as needed for other (rashes and sores) 60 g 1    SENEXON-S 8.6-50 MG tablet Take 1-2 tablets by mouth twice a day.*      topiramate (TOPAMAX) 25 MG tablet Take 3 tablets (75 mg) by mouth at bedtime. 90 tablet 3    traZODone (DESYREL) 100 MG tablet Take 100 mg by mouth daily      mometasone-formoterol (DULERA) 200-5 MCG/ACT inhaler  (Patient not taking: Reported on 9/24/2024)      naproxen (NAPROSYN) 500 MG tablet Take 500 mg by mouth 2 times daily (Patient not taking: Reported on 9/24/2024)      )  ( Diabetes Eval:    )    ( Pain Eval:  No Pain (0) )    ( Wound Eval:       )    (   History   Smoking Status    Former    Packs/day: 0.10    Types: Cigarettes   Smokeless Tobacco    Never    )    ( Signed By:  Tod Beebe; October 17, 2024; 3:45 PM )

## 2024-10-18 NOTE — PATIENT INSTRUCTIONS
"Benjamin Melo,   Thank you for allowing us the privilege of caring for you. We hope we provided you with the excellent service you deserve.   Please let us know if there is anything else we can do for you so that we can be sure you are completely satisfied with your care experience.    To ensure the quality of our services you may be receiving a patient satisfaction survey from an independent patient satisfaction monitoring company.    The greatest compliment you can give is a \"Likely to Recommend\"    Your visit was with Sruthi Cruz FEDERICO Fournier today.    Instructions per today's visit:     Continue Topiramate 75mg at night. Refills sent.   Sruthi Cruz in 3 months     ___________________________________________________________________________  Important contact and scheduling information:  Please call our contact center at [phone] to schedule your next appointments.  For any nursing questions or concerns call Ramila James LPN at [phone] or Berta Sykes RN at [phone]  Please call during clinic hours Monday through Friday 8:00a - 4:00p if you have questions or you can contact us via [employer] at anytime and we will reply during clinic hours.    Lab results will be communicated through My Chart or letter (if My Chart not used). Please call the clinic if you have not received communication after 1 week or if you have any questions.?  Clinic Fax: [phone]  __________________________________________________________________________    If labs were ordered today:    Please make an appointment to have them drawn at your convenience.     To schedule the Lab Appointment using [employer]:  Select \"Schedule an Appointment\"  Select \"Lab Only\"  For \"A couple of questions\", select \"Other\"  For \"Which locations work for you?, select the location and set up the appointment    To schedule by phone call [phone] to schedule a lab only appointment at [facility] " lab.  ___________________________________________________________________________  Work with A Health !  Virtual Sessions are Available through [facility] Weight Management Clinics    To learn more, call to schedule a free, Health  Q&A appointment: [phone]     What is Health Coaching?  Do you know what you are supposed to do, but you just aren't doing it?  Then, HEALTH COACHING may help you!   Get unstuck and move forward with the support of a professionally trained [employer]-HWC (National Board-Certified Health and ) who uses evidence-based approaches to help you move forward with healthy lifestyle changes in the areas of weight loss, stress management and overall well-being.    Health Coaches help you identify goals that will work best for you. Health Coaches provide support and encouragement with overcoming barriers and help you to find inspiration and motivation to lead a healthy lifestyle.    Option one:  Health Coaching 3-Pack; Three, 30-minute Health Coaching Visits, for $99  Visits are done virtually (phone or video)  This is a self pay service; we do not accept insurance for reginaldo coaching.    Option two:   The 24 week Plan; 11 Health Coaching Visits, and a 7 months subscription to [employer]-- on-demand fitness, nutrition and mindfulness classes, for $499 (employee discounts may be available). Participants will also meet regularly with a weight management Medical Provider and a Registered/Licensed Dietician.  This is a self-pay service; we do not accept insurance for health coaching.    To Schedule a free Health  Q&A appointment to learn more,  call [phone].  ____________________________________________________________________  [facility]  Healthy Lifestyle Group    Healthy Lifestyle Group  This is a 60 minute virtual coaching group for those who want to lead a healthier lifestyle. Come together to set goals and overcome  "barriers in a supportive group environment. We will address the four pillars of health--nutrition, exercise, sleep and emotional well-being.  This group is highly recommended for those who are participating in the 24 week Healthy Lifestyle Plan and our Health Coaching sessions.    WHEN: This group meets the first Friday of the month, 12:30 PM - 1:30 PM online, via a zoom meeting.      FACILITATOR: Led by National Board Certified Health and , Jazmin Ewing [facility].    TO REGISTER: Please call the Call Center at [phone] to register. You will get an appointment to attend in [email or web address]. Fifteen minutes prior to the meeting, complete the e-check in and you will get the link to join the meeting.  There is no charge to attend this group and space is limited.      2023 and 2024 Meeting Topics and Dates:    November 3: Introduction to Mindfulness (Learn simple and effective mindfulness practices and how it can benefit you)    December 8: Let's Talk (guided discussion on our wins and challenges)    January 5: New Years Vision: Manifest your Best 2024! (Guided imagery,  journaling and discussion)    February 2: Let's Talk    March 1: 10 Percent Happier by Hernán Jones (Book Bites; a guided discussion on the nuggets of wisdom from favorite wellness books; no need to read the book but highly encouraged)    April 5: Let's Talk    May 3: \"Essentialism; The Disciplined Pursuit of Less by Monty Lopez (book bites discussion)    June 7: Let's Talk    July 5: NO MEETING, off for the 4th of July Holiday    August 2: The Blue Zones, Secrets for Living a Longer Life by Hernán Bullock (book bites discussion)      If you would like bariatric surgery specific support group info please let your care team know.         Thank you,   [facility] Comprehensive Weight Management Team                          "

## 2025-01-15 ENCOUNTER — OFFICE VISIT (OUTPATIENT)
Dept: PLASTIC SURGERY | Facility: CLINIC | Age: 60
End: 2025-01-15
Payer: COMMERCIAL

## 2025-01-15 VITALS
HEIGHT: 64 IN | BODY MASS INDEX: 39.95 KG/M2 | DIASTOLIC BLOOD PRESSURE: 76 MMHG | SYSTOLIC BLOOD PRESSURE: 114 MMHG | HEART RATE: 84 BPM | OXYGEN SATURATION: 99 % | WEIGHT: 234 LBS

## 2025-01-15 DIAGNOSIS — L98.7 EXCESS SKIN OF ABDOMEN: Primary | ICD-10-CM

## 2025-01-15 ASSESSMENT — PAIN SCALES - GENERAL: PAINLEVEL_OUTOF10: NO PAIN (0)

## 2025-01-15 NOTE — NURSING NOTE
"Chief Complaint   Patient presents with    CAROLINE Melo, is being seen today for a Consult to discuss surgery on 2/12       Vitals:    01/15/25 0816   BP: 114/76   BP Location: Left arm   Patient Position: Chair   Cuff Size: Adult Large   Pulse: 84   SpO2: 99%   Weight: 106.1 kg (234 lb)   Height: 1.626 m (5' 4\")       Body mass index is 40.17 kg/m .      Opal Juan LPN    "

## 2025-01-15 NOTE — NURSING NOTE
Pre Op Teaching Note:       Pre and Post op Patient Education    Relevant Diagnosis:  excess skin of abdomen  Surgical procedure:  panniculectomy    Patient demonstrates understanding of the following:  Date of surgery:  2/12/25  Location of surgery:  [facility] - 5th Floor  History and Physical and any other testing necessary prior to surgery: Yes, discussed with pt need for pre-op within 30 days of surgery with PCP.    Patient demonstrates understanding of the following:    Pre-op showering/scrub information with PCMX Soap: Yes, soap given in clinic today  Blood thinner medications discussed and when to stop (if applicable):  Per PCP at pre-op. Pt states she had this at [facility] on 1/8/25, discussed with her this is too far out, needs another pre-op. Will ask surgery scheduler to assist with scheduling a virtual PAC appt.    Post-op follow-up:  Discussed how to contact the hospital, nurse, and clinic scheduling staff if necessary. (See packet information)    Instructional materials used/given/mailed:  [facility] Packet per surgery scheduler, post op teaching sheet, Soap.  Pt advised that final arrival information to come closer to the surgery date by [facility] nurses.

## 2025-01-15 NOTE — LETTER
1/15/2025       RE: Kameron Park  [address]  [facility] [address]     Dear Colleague,    Thank you for referring your patient, Kameron Park, to the [facility] PLASTIC AND RECONSTRUCTIVE SURGERY CLINIC [place] at [facility]. Please see a copy of my visit note below.    PREOPERATIVE VISIT NOTE     PRESENTING COMPLAINT:  Preop visit for upcoming panniculectomy on 2/12/2025     HISTORY OF PRESENTING COMPLAINT: The patient is here for a pre-op visit for the patient's surgery.  The patient is being seen in the presence of my nurse. There is no change since the patient's consultation. Please see the consult note for details.     No change in history and physical exam.  Prior authorization unsure of right now.  Plan is a panniculectomy.  Patient is okay losing her umbilicus.  She understands this is a functional procedure not an aesthetic procedure.  She understands that if we keep the umbilicus it may be lower than usual.    The major risks of wound complications up to 50 to 80% were discussed in detail.    Preop not formally done yet.  We will arrange for a PAC visit.     ASSESSMENT AND PLAN:  Based upon the above findings, the patient is here for a preop visit for upcoming surgery.  I had a abhinav, detailed discussion with the patient in the presence of my nurse (who was present from beginning to end) about the proposed surgery. I was very clear and detailed about overview of surgery, perioperative plans, and expectations. All risks, benefits and alternatives of the surgery including but not limited to pain, infection, bleeding, scarring, asymmetry, seromas, hematomas, wound breakdown, wound dehiscence, prominent scar, hypertrophic scar, keloid scar, requirement of further surgeries, skin/tissue necrosis, nerve/muscle/deeper structure injury, DVT, PE, MI, CVA, pneumonia, renal failure and death, were explained in detail. The patient agreed and  understood them all and had all the questions answered to the patient's satisfaction, and the patient wants to proceed with surgery. I look forward to helping the patient out in the near future.  All questions were answered.  The patient was happy with the visit.    Total time spent in the encounter today including chart review, visit itself, and post-visit paperwork was 30 minutes.       Again, thank you for allowing me to participate in the care of your patient.      Sincerely,    SHANE Camarillo MD

## 2025-01-15 NOTE — PROGRESS NOTES
PREOPERATIVE VISIT NOTE     PRESENTING COMPLAINT:  Preop visit for upcoming panniculectomy on 2/12/2025     HISTORY OF PRESENTING COMPLAINT: The patient is here for a pre-op visit for the patient's surgery.  The patient is being seen in the presence of my nurse. There is no change since the patient's consultation. Please see the consult note for details.     No change in history and physical exam.  Prior authorization unsure of right now.  Plan is a panniculectomy.  Patient is okay losing her umbilicus.  She understands this is a functional procedure not an aesthetic procedure.  She understands that if we keep the umbilicus it may be lower than usual.    The major risks of wound complications up to 50 to 80% were discussed in detail.    Preop not formally done yet.  We will arrange for a PAC visit.     ASSESSMENT AND PLAN:  Based upon the above findings, the patient is here for a preop visit for upcoming surgery.  I had a abhinav, detailed discussion with the patient in the presence of my nurse (who was present from beginning to end) about the proposed surgery. I was very clear and detailed about overview of surgery, perioperative plans, and expectations. All risks, benefits and alternatives of the surgery including but not limited to pain, infection, bleeding, scarring, asymmetry, seromas, hematomas, wound breakdown, wound dehiscence, prominent scar, hypertrophic scar, keloid scar, requirement of further surgeries, skin/tissue necrosis, nerve/muscle/deeper structure injury, DVT, PE, MI, CVA, pneumonia, renal failure and death, were explained in detail. The patient agreed and understood them all and had all the questions answered to the patient's satisfaction, and the patient wants to proceed with surgery. I look forward to helping the patient out in the near future.  All questions were answered.  The patient was happy with the visit.    Total time spent in the encounter today including chart review, visit itself, and  post-visit paperwork was 30 minutes.

## 2025-01-16 NOTE — TELEPHONE ENCOUNTER
FUTURE VISIT INFORMATION      SURGERY INFORMATION:  Date: 2/12/25  Location: [facility] OR  Surgeon:  SHANE Camarillo MD   Anesthesia Type:  general with Block  Procedure: PANNICULECTOMY     RECORDS REQUESTED FROM:       Primary Care Provider: [facility]     Pertinent Medical History: hypertension    Most recent EKG+ Tracing 7/11/24- [facility]    Most recent ECHO: 8/15/23- [facility]

## 2025-01-27 ENCOUNTER — OFFICE VISIT (OUTPATIENT)
Dept: ENDOCRINOLOGY | Facility: CLINIC | Age: 60
End: 2025-01-27
Payer: COMMERCIAL

## 2025-01-27 ENCOUNTER — TELEPHONE (OUTPATIENT)
Dept: ENDOCRINOLOGY | Facility: CLINIC | Age: 60
End: 2025-01-27

## 2025-01-27 ENCOUNTER — LAB (OUTPATIENT)
Dept: LAB | Facility: CLINIC | Age: 60
End: 2025-01-27
Payer: COMMERCIAL

## 2025-01-27 VITALS
OXYGEN SATURATION: 96 % | HEART RATE: 65 BPM | WEIGHT: 227.2 LBS | HEIGHT: 64 IN | SYSTOLIC BLOOD PRESSURE: 151 MMHG | BODY MASS INDEX: 38.79 KG/M2 | DIASTOLIC BLOOD PRESSURE: 90 MMHG

## 2025-01-27 DIAGNOSIS — E66.812 CLASS 2 SEVERE OBESITY WITH SERIOUS COMORBIDITY AND BODY MASS INDEX (BMI) OF 39.0 TO 39.9 IN ADULT, UNSPECIFIED OBESITY TYPE (H): ICD-10-CM

## 2025-01-27 DIAGNOSIS — E66.01 CLASS 2 SEVERE OBESITY WITH SERIOUS COMORBIDITY AND BODY MASS INDEX (BMI) OF 39.0 TO 39.9 IN ADULT, UNSPECIFIED OBESITY TYPE (H): ICD-10-CM

## 2025-01-27 LAB
ANION GAP SERPL CALCULATED.3IONS-SCNC: 6 MMOL/L (ref 7–15)
BUN SERPL-MCNC: 12 MG/DL (ref 6–20)
CALCIUM SERPL-MCNC: 8.9 MG/DL (ref 8.8–10.4)
CHLORIDE SERPL-SCNC: 107 MMOL/L (ref 98–107)
CREAT SERPL-MCNC: 0.8 MG/DL (ref 0.51–0.95)
EGFRCR SERPLBLD CKD-EPI 2021: 85 ML/MIN/1.73M2
GLUCOSE SERPL-MCNC: 96 MG/DL (ref 70–99)
HCO3 SERPL-SCNC: 25 MMOL/L (ref 22–29)
POTASSIUM SERPL-SCNC: 4.5 MMOL/L (ref 3.4–5.3)
SODIUM SERPL-SCNC: 138 MMOL/L (ref 135–145)

## 2025-01-27 PROCEDURE — 80048 BASIC METABOLIC PNL TOTAL CA: CPT | Performed by: PATHOLOGY

## 2025-01-27 PROCEDURE — 36415 COLL VENOUS BLD VENIPUNCTURE: CPT | Performed by: PATHOLOGY

## 2025-01-27 RX ORDER — TOPIRAMATE 25 MG/1
50 TABLET, FILM COATED ORAL 2 TIMES DAILY
Qty: 120 TABLET | Refills: 3 | Status: SHIPPED | OUTPATIENT
Start: 2025-01-27

## 2025-01-27 RX ORDER — ESCITALOPRAM OXALATE 20 MG/1
1 TABLET ORAL EVERY MORNING
COMMUNITY
Start: 2024-11-18

## 2025-01-27 ASSESSMENT — PATIENT HEALTH QUESTIONNAIRE - PHQ9
SUM OF ALL RESPONSES TO PHQ QUESTIONS 1-9: 10
SUM OF ALL RESPONSES TO PHQ QUESTIONS 1-9: 10
10. IF YOU CHECKED OFF ANY PROBLEMS, HOW DIFFICULT HAVE THESE PROBLEMS MADE IT FOR YOU TO DO YOUR WORK, TAKE CARE OF THINGS AT HOME, OR GET ALONG WITH OTHER PEOPLE: NOT DIFFICULT AT ALL

## 2025-01-27 ASSESSMENT — PAIN SCALES - GENERAL: PAINLEVEL_OUTOF10: NO PAIN (0)

## 2025-01-27 NOTE — NURSING NOTE
"(   Chief Complaint   Patient presents with    Follow Up     Return MW    )    ( Weight: 103.1 kg (227 lb 3.2 oz) )  ( Height: 162.6 cm (5' 4\") )  ( BMI (Calculated): 39 )  (   )  ( Cumulative weight loss (lbs): 67.8 )  ( Last Visits Weight: 106.1 kg (234 lb) )  ( Wt change since last visit (lbs): -6.8 )  ( Waist Circumference (cm): 129 cm )  (   )    ( BP: (!) 151/90 )  (   )  (   )  (   )  ( Pulse: 65 )  (   )  ( SpO2: 96 % )    (   Patient Active Problem List   Diagnosis    Class 2 severe obesity with serious comorbidity and body mass index (BMI) of 39.0 to 39.9 in adult (H)    Eating disorder    Depression    Knee pain    Back pain    HTN (hypertension)    Allergic conjunctivitis and rhinitis, bilateral    Bilateral primary osteoarthritis of knee    Bipolar affective disorder, manic (H)    Borderline personality disorder (H)    Carpal tunnel syndrome    Cholelithiases    Chronic pain of both shoulders    Cocaine use disorder, severe, dependence (H)    De Quervain's tenosynovitis, left    Headache syndrome    Moderate persistent asthma without complication    Myopia of both eyes with astigmatism and presbyopia    Osteoarthrosis    Normocytic anemia    Plantar fasciitis of left foot    Posttraumatic stress disorder    Traumatic brain injury (H)    Vitamin D deficiency    Excess skin of abdomen    )  (   Current Outpatient Medications   Medication Sig Dispense Refill    albuterol (PROAIR HFA/PROVENTIL HFA/VENTOLIN HFA) 108 (90 BASE) MCG/ACT Inhaler Inhale 1-2 puffs into the lungs      diazepam (VALIUM) 5 MG tablet Take 5 mg by mouth as needed      escitalopram (LEXAPRO) 20 MG tablet Take 1 tablet by mouth daily.      FLUoxetine (PROZAC) 20 MG capsule Take 20 mg by mouth daily      FLUoxetine (PROZAC) 40 MG capsule Take 40 mg by mouth daily      fluticasone (FLONASE) 50 MCG/ACT nasal spray Spray 2 sprays in nostril      SENEXON-S 8.6-50 MG tablet Take 1-2 tablets by mouth twice a day.*      topiramate (TOPAMAX) 25 " MG tablet Take 3 tablets (75 mg) by mouth at bedtime. 90 tablet 3    traZODone (DESYREL) 100 MG tablet Take 100 mg by mouth daily      capsaicin (ZOSTRIX) 0.025 % external cream Apply 1 Application topically (Patient not taking: Reported on 1/27/2025)      methocarbamol (ROBAXIN) 500 MG tablet Take 1,000 mg by mouth.      mometasone-formoterol (DULERA) 200-5 MCG/ACT inhaler  (Patient not taking: Reported on 9/24/2024)      naproxen (NAPROSYN) 500 MG tablet Take 500 mg by mouth 2 times daily (Patient not taking: Reported on 9/24/2024)      nystatin (MYCOSTATIN) 468670 UNIT/GM external powder Apply topically as needed for other (rashes and sores) (Patient not taking: Reported on 1/27/2025) 60 g 1    )  ( Diabetes Eval:    )    ( Pain Eval:  No Pain (0) )    ( Wound Eval:       )    (   History   Smoking Status    Former    Types: Cigarettes   Smokeless Tobacco    Never    )    ( Signed By:  MADDI Rivera January 27, 2025; 12:22 PM )

## 2025-01-27 NOTE — TELEPHONE ENCOUNTER
Patient confirmed scheduled appointment:  Date: 7/7/25  Time: 2:30 pm  Visit type: NERI LEW  Provider: Sruthi Fournier  Location: [facility] 4th floor  Testing/imaging: n/a  Additional notes: n/a

## 2025-01-27 NOTE — PATIENT INSTRUCTIONS
Visit Plan:     Increase Topiramate 50mg twice daily.   Kidney labs ordered - to be collected   Activity goal - continue to increase walking as able  Sruthi Cruz in 3-4 months

## 2025-01-27 NOTE — PROGRESS NOTES
Return Medical Weight Management Note     Kameron Park  MRN:  [id number]  :  1965  SHOAIB:  2025    Dear Veena Stafford NP,    I had the pleasure of seeing your patient Kameron Park. She is a 59 year old female who I am continuing to see for treatment of obesity related to:        2022    12:52 PM   --   I have the following health issues associated with obesity High Blood Pressure    Asthma    Osteoarthritis (joint disease)   I have the following symptoms associated with obesity Knee Pain    Depression    Back Pain    Fatigue       Assessment & Plan   Problem List Items Addressed This Visit       Class 2 severe obesity with serious comorbidity and body mass index (BMI) of 39.0 to 39.9 in adult (H)    Relevant Medications    topiramate (TOPAMAX) 25 MG tablet    Other Relevant Orders    Basic metabolic panel (Completed)      Increase Topiramate 50mg twice daily.   Kidney labs ordered - to be collected   Activity goal - continue to increase walking as able  Sruthi Cruz in 3-4 months         INTERVAL HISTORY:  First seen by Dr. Vallecillo in  - started Topiramate   Starting Weight - 336lbs   Last seen by Dr. Vallecillo 2022 and Continued Topiramate  First seen by me 2023 and continued topiramate 75mg   Last seen 10/17/2024 - had back surgery for spinal stenosis over the summer. Really limiting mobility and has seen some weight regain.            Since last visit has gained around 10lbs.     Plastic surgery for excess skin scheduled on 2024    Anti-obesity medication history    Current:   Topiramate 75mg - no side effects. Would like to dose increase today       Recent diet changes: has been having more sugar cravings. Also having increase hunger, especially in the middle of the day. Drinking 60-90oz water daily. Has been more snacks. Continues to have moms meals - breakfast and dinner.     Recent exercise/activity changes: going to PT 1xweek, trying to do them at home daily. Back pain  has improved. Just using cane right now.     Recent stressors: overall stable. Continues to see therapist 1xweek    Recent sleep changes: no concerns         CURRENT WEIGHT:   227 lbs 3.2 oz    Initial Weight (lbs): 295 lbs  Last Visits Weight: 106.1 kg (234 lb)  Cumulative weight loss (lbs): 67.8  Weight Loss Percentage: 22.98%  Waist Circumference (cm): 129 cm    Wt Readings from Last 5 Encounters:   01/27/25 103.1 kg (227 lb 3.2 oz)   01/15/25 106.1 kg (234 lb)   10/17/24 98.4 kg (217 lb)   09/24/24 103.9 kg (229 lb 1.6 oz)   12/19/23 96.6 kg (213 lb)             1/27/2025    11:40 AM   Changes and Difficulties   I have made the following changes to my diet since my last visit: not eating sweets   With regards to my diet, I am still struggling with: drinking more water   I have made the following changes to my activity/exercise since my last visit: I try to do more walking   With regards to my activity/exercise, I am still struggling with: Just exercise         MEDICATIONS:   Current Outpatient Medications   Medication Sig Dispense Refill    albuterol (PROAIR HFA/PROVENTIL HFA/VENTOLIN HFA) 108 (90 BASE) MCG/ACT Inhaler Inhale 1-2 puffs into the lungs      diazepam (VALIUM) 5 MG tablet Take 5 mg by mouth as needed      escitalopram (LEXAPRO) 20 MG tablet Take 1 tablet by mouth daily.      FLUoxetine (PROZAC) 20 MG capsule Take 20 mg by mouth daily      FLUoxetine (PROZAC) 40 MG capsule Take 40 mg by mouth daily      fluticasone (FLONASE) 50 MCG/ACT nasal spray Spray 2 sprays in nostril      SENEXON-S 8.6-50 MG tablet Take 1-2 tablets by mouth twice a day.*      topiramate (TOPAMAX) 25 MG tablet Take 2 tablets (50 mg) by mouth 2 times daily. 120 tablet 3    traZODone (DESYREL) 100 MG tablet Take 100 mg by mouth daily      capsaicin (ZOSTRIX) 0.025 % external cream Apply 1 Application topically (Patient not taking: Reported on 1/27/2025)      methocarbamol (ROBAXIN) 500 MG tablet Take 1,000 mg by mouth.       "mometasone-formoterol (DULERA) 200-5 MCG/ACT inhaler  (Patient not taking: Reported on 9/24/2024)      naproxen (NAPROSYN) 500 MG tablet Take 500 mg by mouth 2 times daily (Patient not taking: Reported on 9/24/2024)      nystatin (MYCOSTATIN) 901990 UNIT/GM external powder Apply topically as needed for other (rashes and sores) (Patient not taking: Reported on 1/27/2025) 60 g 1           1/27/2025    11:40 AM   Weight Loss Medication History Reviewed With Patient   Which weight loss medications are you currently taking on a regular basis? Topamax (topiramate)   Are you having any side effects from the weight loss medication that we have prescribed you? No         Objective    BP (!) 151/90 (BP Location: Left arm, Patient Position: Sitting, Cuff Size: Adult Large)   Pulse 65   Ht 1.626 m (5' 4\")   Wt 103.1 kg (227 lb 3.2 oz)   SpO2 96%   BMI 39.00 kg/m        PHYSICAL EXAM:  GENERAL: alert and no distress  EYES: Eyes grossly normal to inspection.  No discharge or erythema, or obvious scleral/conjunctival abnormalities.  RESP: No audible wheeze, cough, or visible cyanosis.    SKIN: Visible skin clear. No significant rash, abnormal pigmentation or lesions.  NEURO: Cranial nerves grossly intact.  Mentation and speech appropriate for age.  PSYCH: Appropriate affect, tone, and pace of words        Sincerely,    Sruthi Fournier PA-C      18 minutes spent by me on the date of the encounter doing chart review, history and exam, documentation and further activities per the note    The longitudinal plan of care for the diagnosis(es)/condition(s) as documented were addressed during this visit. Due to the added complexity in care, I will continue to support Kameron in the subsequent management and with ongoing continuity of care.  "

## 2025-01-27 NOTE — CONFIDENTIAL NOTE
Depression Screening Follow-up        1/27/2025    11:32 AM   PHQ   PHQ-9 Total Score 10    Q9: Thoughts of better off dead/self-harm past 2 weeks Not at all       Patient-reported         1/27/2025    11:32 AM   Last PHQ-9   1.  Little interest or pleasure in doing things 2   2.  Feeling down, depressed, or hopeless 2   3.  Trouble falling or staying asleep, or sleeping too much 2   4.  Feeling tired or having little energy 2   5.  Poor appetite or overeating 2   6.  Feeling bad about yourself 0   7.  Trouble concentrating 0   8.  Moving slowly or restless 0   Q9: Thoughts of better off dead/self-harm past 2 weeks 0   PHQ-9 Total Score 10        Patient-reported         Does the patient currently have a mental health provider?  Yes, patient was referred back to current mental health provider.    Sruthi Fournier PA-C

## 2025-01-27 NOTE — LETTER
2025       RE: Kameron Park  [address]  [facility] [address]     Dear Colleague,    Thank you for referring your patient, Kameron Park, to the [facility]NAGEMENT CLINIC [place] at . Please see a copy of my visit note below.      Return Medical Weight Management Note     Kameron Park  MRN:  [id number]  :  1965  SHOAIB:  2025    Dear Veena Stafford NP,    I had the pleasure of seeing your patient Kameron Park. She is a 59 year old female who I am continuing to see for treatment of obesity related to:        2022    12:52 PM   --   I have the following health issues associated with obesity High Blood Pressure    Asthma    Osteoarthritis (joint disease)   I have the following symptoms associated with obesity Knee Pain    Depression    Back Pain    Fatigue       Assessment & Plan  Problem List Items Addressed This Visit       Class 2 severe obesity with serious comorbidity and body mass index (BMI) of 39.0 to 39.9 in adult (H)    Relevant Medications    topiramate (TOPAMAX) 25 MG tablet    Other Relevant Orders    Basic metabolic panel (Completed)      Increase Topiramate 50mg twice daily.   Kidney labs ordered - to be collected   Activity goal - continue to increase walking as able  Sruthi Cruz in 3-4 months         INTERVAL HISTORY:  First seen by Dr. Vallecillo in  - started Topiramate   Starting Weight - 336lbs   Last seen by Dr. Vallecillo 2022 and Continued Topiramate  First seen by me 2023 and continued topiramate 75mg   Last seen 10/17/2024 - had back surgery for spinal stenosis over the summer. Really limiting mobility and has seen some weight regain.            Since last visit has gained around 10lbs.     Plastic surgery for excess skin scheduled on 2024    Anti-obesity medication history    Current:   Topiramate 75mg - no side effects. Would like to dose increase today       Recent  diet changes: has been having more sugar cravings. Also having increase hunger, especially in the middle of the day. Drinking 60-90oz water daily. Has been more snacks. Continues to have moms meals - breakfast and dinner.     Recent exercise/activity changes: going to PT 1xweek, trying to do them at home daily. Back pain has improved. Just using cane right now.     Recent stressors: overall stable. Continues to see therapist 1xweek    Recent sleep changes: no concerns         CURRENT WEIGHT:   227 lbs 3.2 oz    Initial Weight (lbs): 295 lbs  Last Visits Weight: 106.1 kg (234 lb)  Cumulative weight loss (lbs): 67.8  Weight Loss Percentage: 22.98%  Waist Circumference (cm): 129 cm    Wt Readings from Last 5 Encounters:   01/27/25 103.1 kg (227 lb 3.2 oz)   01/15/25 106.1 kg (234 lb)   10/17/24 98.4 kg (217 lb)   09/24/24 103.9 kg (229 lb 1.6 oz)   12/19/23 96.6 kg (213 lb)             1/27/2025    11:40 AM   Changes and Difficulties   I have made the following changes to my diet since my last visit: not eating sweets   With regards to my diet, I am still struggling with: drinking more water   I have made the following changes to my activity/exercise since my last visit: I try to do more walking   With regards to my activity/exercise, I am still struggling with: Just exercise         MEDICATIONS:   Current Outpatient Medications   Medication Sig Dispense Refill     albuterol (PROAIR HFA/PROVENTIL HFA/VENTOLIN HFA) 108 (90 BASE) MCG/ACT Inhaler Inhale 1-2 puffs into the lungs       diazepam (VALIUM) 5 MG tablet Take 5 mg by mouth as needed       escitalopram (LEXAPRO) 20 MG tablet Take 1 tablet by mouth daily.       FLUoxetine (PROZAC) 20 MG capsule Take 20 mg by mouth daily       FLUoxetine (PROZAC) 40 MG capsule Take 40 mg by mouth daily       fluticasone (FLONASE) 50 MCG/ACT nasal spray Spray 2 sprays in nostril       SENEXON-S 8.6-50 MG tablet Take 1-2 tablets by mouth twice a day.*       topiramate (TOPAMAX) 25 MG  "tablet Take 2 tablets (50 mg) by mouth 2 times daily. 120 tablet 3     traZODone (DESYREL) 100 MG tablet Take 100 mg by mouth daily       capsaicin (ZOSTRIX) 0.025 % external cream Apply 1 Application topically (Patient not taking: Reported on 1/27/2025)       methocarbamol (ROBAXIN) 500 MG tablet Take 1,000 mg by mouth.       mometasone-formoterol (DULERA) 200-5 MCG/ACT inhaler  (Patient not taking: Reported on 9/24/2024)       naproxen (NAPROSYN) 500 MG tablet Take 500 mg by mouth 2 times daily (Patient not taking: Reported on 9/24/2024)       nystatin (MYCOSTATIN) 718068 UNIT/GM external powder Apply topically as needed for other (rashes and sores) (Patient not taking: Reported on 1/27/2025) 60 g 1           1/27/2025    11:40 AM   Weight Loss Medication History Reviewed With Patient   Which weight loss medications are you currently taking on a regular basis? Topamax (topiramate)   Are you having any side effects from the weight loss medication that we have prescribed you? No         Objective   BP (!) 151/90 (BP Location: Left arm, Patient Position: Sitting, Cuff Size: Adult Large)   Pulse 65   Ht 1.626 m (5' 4\")   Wt 103.1 kg (227 lb 3.2 oz)   SpO2 96%   BMI 39.00 kg/m        PHYSICAL EXAM:  GENERAL: alert and no distress  EYES: Eyes grossly normal to inspection.  No discharge or erythema, or obvious scleral/conjunctival abnormalities.  RESP: No audible wheeze, cough, or visible cyanosis.    SKIN: Visible skin clear. No significant rash, abnormal pigmentation or lesions.  NEURO: Cranial nerves grossly intact.  Mentation and speech appropriate for age.  PSYCH: Appropriate affect, tone, and pace of words        Sincerely,    Sruthi Fournier PA-C      18 minutes spent by me on the date of the encounter doing chart review, history and exam, documentation and further activities per the note    The longitudinal plan of care for the diagnosis(es)/condition(s) as documented were addressed during this visit. Due " to the added complexity in care, I will continue to support [facility] in the subsequent management and with ongoing continuity of care.      Again, thank you for allowing me to participate in the care of your patient.      Sincerely,    Sruthi Fournier PA-C

## 2025-01-29 ENCOUNTER — VIRTUAL VISIT (OUTPATIENT)
Dept: SURGERY | Facility: CLINIC | Age: 60
End: 2025-01-29
Payer: COMMERCIAL

## 2025-01-29 ENCOUNTER — TELEPHONE (OUTPATIENT)
Dept: SURGERY | Facility: CLINIC | Age: 60
End: 2025-01-29

## 2025-01-29 ENCOUNTER — PRE VISIT (OUTPATIENT)
Dept: SURGERY | Facility: CLINIC | Age: 60
End: 2025-01-29

## 2025-01-29 ENCOUNTER — ANESTHESIA EVENT (OUTPATIENT)
Dept: SURGERY | Facility: AMBULATORY SURGERY CENTER | Age: 60
End: 2025-01-29
Payer: COMMERCIAL

## 2025-01-29 VITALS — BODY MASS INDEX: 38.76 KG/M2 | WEIGHT: 227 LBS | HEIGHT: 64 IN

## 2025-01-29 DIAGNOSIS — Z01.818 PREOP EXAMINATION: Primary | ICD-10-CM

## 2025-01-29 DIAGNOSIS — D64.9 ANEMIA, UNSPECIFIED TYPE: ICD-10-CM

## 2025-01-29 DIAGNOSIS — L98.7 EXCESS SKIN OF ABDOMEN: ICD-10-CM

## 2025-01-29 ASSESSMENT — PAIN SCALES - GENERAL: PAINLEVEL_OUTOF10: NO PAIN (0)

## 2025-01-29 ASSESSMENT — ENCOUNTER SYMPTOMS: SEIZURES: 0

## 2025-01-29 ASSESSMENT — LIFESTYLE VARIABLES: TOBACCO_USE: 1

## 2025-01-29 NOTE — PATIENT INSTRUCTIONS
Preparing for Your Surgery      Name:  Kameron Park   MRN:  [id number]   :  1965   Today's Date:  2025       Arriving for surgery:  Surgery date:  25  Arrival time:  11:00 am        Please come to:     [facility] [address] [address]     Parking is available in front of the Clinics and Surgery Center building from 5:30AM to 8:00PM.  -  Proceed to the 5th floor to check into the Ambulatory Surgery Center.              >> There will be patient concierges on the 1st and 5th floor, for assistance or an escort, if you would like.              >> Please call [phone] with any questions.    What can I eat or drink?  -  You may eat and drink normally up to 8 hours prior to arrival time.   -  You may have clear liquids until 2 hours prior to arrival time.    Examples of clear liquids:  Water  Clear broth  Juices (apple, white grape, white cranberry  and cider) without pulp  Noncarbonated, powder based beverages  (lemonade and Curtis-Aid)  Sodas (Sprite, 7-Up, ginger ale and seltzer)  Coffee or tea (without milk or cream)  Gatorade    -  No Alcohol or cannabis products for at least 24 hours before surgery.     Which medicines can I take?    Hold Aspirin for 7 days before surgery.   Hold Multivitamins for 7 days before surgery.  Hold Supplements for 7 days before surgery.  Hold Ibuprofen (Advil, Motrin) for 1 day(s) before surgery--unless otherwise directed by surgeon.  Hold Naproxen (Aleve) for 4 days before surgery.    -  DO NOT take these medications the day of surgery:  Flonase and sennexon.    -  PLEASE TAKE these medications the day of surgery:  Tyelnol or robaxin if needed; take lexapro, prozac, topamax.  Use and bring inhaler to hospital.    How do I prepare myself?  - Please take 2 showers (one the night prior to surgery and one the morning of surgery) using Scrubcare or Hibiclens soap.    Use this soap only from the  neck to your toes. Avoid genital area      Leave the soap on your skin for one minute--then rinse thoroughly.      You may use your own shampoo and conditioner. No other hair products.   - Please remove all jewelry and body piercings.  - No lotions, deodorants or fragrance.  - No makeup or fingernail polish.   - Bring your ID and insurance card.    -If you use a CPAP machine, please bring the CPAP machine, tubing, and mask to hospital.    -If you have a Deep Brain Stimulator, Spinal Cord Stimulator, or any Neuro Stimulator device---you must bring the remote control to the hospital.      ALL PATIENTS GOING HOME THE SAME DAY OF SURGERY ARE REQUIRED TO HAVE A RESPONSIBLE ADULT TO DRIVE AND BE IN ATTENDANCE WITH THEM FOR 24 HOURS FOLLOWING SURGERY.    Covid testing policy as of 12/06/2022  Your surgeon will notify and schedule you for a COVID test if one is needed before surgery--please direct any questions or COVID symptoms to your surgeon      Questions or Concerns:    - For any questions regarding the day of surgery or your hospital stay, please contact the Pre Admission Nursing Office at [phone].       - If you have health changes between today and your surgery, please call your surgeon.       - For questions after surgery, please call your surgeons office.           Current Visitor Guidelines    You may have 2 visitors in the pre op area.    Visiting hours: 8 a.m. to 8:30 p.m.    Patients confirmed or suspected to have symptoms of COVID 19 or flu:     No visitors allowed for adult patients.   Children (under age 18) can have 1 named visitor.     People who are sick or showing symptoms of COVID 19 or flu:    Are not allowed to visit patients--we can only make exceptions in special situations.       Please follow these guidelines for your visit:          Please maintain social distance          Masking is optional--however at times you may be asked to wear a mask for the safety of yourself and others     Clean  your hands with alcohol hand . Do this when you arrive at and leave the building and patient room,    And again after you touch your mask or anything in the room.     Go directly to and from the room you are visiting.     Stay in the patient s room during your visit. Limit going to other places in the hospital as much as possible     Leave bags and jackets at home or in the car.     For everyone s health, please don t come and go during your visit. That includes for smoking   during your visit.

## 2025-01-29 NOTE — H&P
Pre-Operative H & P     CC:  Preoperative exam to assess for increased cardiopulmonary risk while undergoing surgery and anesthesia.    Date of Encounter: 1/29/2025  Primary Care Physician:  Veena Stafford     Reason for visit:   Encounter Diagnoses   Name Primary?    Excess skin of abdomen Yes    Preop examination        HPI  Kameron Park is a 59 year old female who presents for pre-operative H & P in preparation for  Procedure Information       Case: [id number] Date/Time: 02/12/25 1235    Procedure: PANNICULECTOMY (Abdomen)    Anesthesia type: General with Block    Diagnosis: Excess skin of abdomen [L98.7]    Pre-op diagnosis: Excess skin of abdomen [L98.7]    Location: [place] [address] / [facility]    Providers: SHANE Camarillo MD            Patient is being evaluated for comorbid conditions of HTN, known heart murmur, asthma, depression, PTSD, bipolar disorder, neuropathy, h/o TBI, urinary incontinence, obesity, chronic anemia, lumbar stenosis s/p L4-5 fusion 7/14/24.    Ms. Park has a history of obesity. She has intentionally lost >100 lbs. She has been counseled by Dr. Camarillo and now presents for the above procedure.      History is obtained from the patient and chart review    Hx of abnormal bleeding or anti-platelet use: denies    Menstrual history: No LMP recorded. Patient is postmenopausal.:       Past Medical History  Past Medical History:   Diagnosis Date    Anemia     Asthma     Bipolar disorder (H)     Borderline personality disorder (H)     Depression     Excess skin of abdomen 01/2025    History of lumbar fusion 07/14/2024    L4-5    Obesity     PTSD (post-traumatic stress disorder)     TBI (traumatic brain injury) (H)     Urinary incontinence        Past Surgical History  Past Surgical History:   Procedure Laterality Date    LEFT CARPAL TUNNEL RELEASE   2009    MAMMOPLASTY REDUCTION BILATERAL  2012    MINIMALLY INVASIVE TRANSFORAMINAL LUMBAR  INTERBODY FUSION FIXATION L4-L5 WITH NEURO MONITORING  07/14/2024    Right carpal tunnel release   2009       Prior to Admission Medications  Current Outpatient Medications   Medication Sig Dispense Refill    albuterol (PROAIR HFA/PROVENTIL HFA/VENTOLIN HFA) 108 (90 BASE) MCG/ACT Inhaler Inhale 1-2 puffs into the lungs      capsaicin (ZOSTRIX) 0.025 % external cream Apply 1 Application. topically.      diazepam (VALIUM) 5 MG tablet Take 5 mg by mouth as needed      escitalopram (LEXAPRO) 20 MG tablet Take 1 tablet by mouth every morning.      FLUoxetine (PROZAC) 20 MG capsule Take 20 mg by mouth every morning. Take along with 40 mg      FLUoxetine (PROZAC) 40 MG capsule Take 40 mg by mouth every morning. With 20mg      fluticasone (FLONASE) 50 MCG/ACT nasal spray Spray 2 sprays in nostril      methocarbamol (ROBAXIN) 500 MG tablet Take 1,000 mg by mouth.      mometasone-formoterol (DULERA) 200-5 MCG/ACT inhaler Inhale 2 puffs into the lungs 2 times daily.      nystatin (MYCOSTATIN) 900565 UNIT/GM external powder Apply topically as needed for other (rashes and sores) 60 g 1    SENEXON-S 8.6-50 MG tablet Take 1-2 tablets by mouth twice a day.*      topiramate (TOPAMAX) 25 MG tablet Take 2 tablets (50 mg) by mouth 2 times daily. 120 tablet 3    traZODone (DESYREL) 100 MG tablet Take 100 mg by mouth at bedtime.      naproxen (NAPROSYN) 500 MG tablet Take 500 mg by mouth 2 times daily (Patient not taking: Reported on 9/24/2024)         Allergies  Allergies   Allergen Reactions    Fish-Derived Products Anaphylaxis    Seafood Swelling    Beta Adrenergic Blockers Other (See Comments)      Contradiction to allergy shots    Pollen Extract Other (See Comments)    Seasonal Allergies      Other reaction(s): Runny Nose       Social History  Social History     Socioeconomic History    Marital status:      Spouse name: Not on file    Number of children: Not on file    Years of education: Not on file    Highest education  level: Not on file   Occupational History    Not on file   Tobacco Use    Smoking status: Former     Current packs/day: 0.10     Types: Cigarettes    Smokeless tobacco: Never    Tobacco comments:     Quit a couple years ago   Vaping Use    Vaping status: Never Used   Substance and Sexual Activity    Alcohol use: Not Currently    Drug use: Not Currently    Sexual activity: Not on file   Other Topics Concern    Not on file   Social History Narrative    Not on file     Social Drivers of Health     Financial Resource Strain: Not on file   Food Insecurity: Unknown (7/15/2024)    Received from [facility]     Hunger Vital Sign     Worried About Running Out of Food in the Last Year: Not on file     Ran Out of Food in the Last Year: Never true   Transportation Needs: No Transportation Needs (7/15/2024)    Received from [facility]     PRAPARE - Transportation     Lack of Transportation (Medical): No     Lack of Transportation (Non-Medical): No   Physical Activity: Not on file   Stress: Not on file   Social Connections: Not on file   Interpersonal Safety: Not At Risk (7/15/2024)    Received from [facility]     Humiliation, Afraid, Rape, and Kick questionnaire     Fear of Current or Ex-Partner: No     Emotionally Abused: No     Physically Abused: No     Sexually Abused: No   Housing Stability: Unknown (7/15/2024)    Received from [facility]     Housing Stability Vital Sign     Unable to Pay for Housing in the Last Year: No     Number of Times Moved in the Last Year: Not on file     Homeless in the Last Year: No       Family History  Family History   Problem Relation Age of Onset    Anesthesia Reaction No family hx of     Deep Vein Thrombosis (DVT) No family hx of        Review of Systems  The complete review of systems is negative other than noted in the HPI or here.     Anesthesia Evaluation   Pt has had prior anesthetic.         ROS/MED HX  ENT/Pulmonary:     (+)                 tobacco use (Quit 2021), Past use,  9  Pack-Year Hx,   Moderate Persistent, asthma (taking Dulera bid)  Treatment: Inhaler daily,              (-) sleep apnea and recent URI   Neurologic: Comment: TBI 2012, denies deficits     (-) no seizures and no CVA   Cardiovascular:     (+)  - -   -  - -                           valvular problems/murmurs  known murmur.    Previous cardiac testing   Echo: Date: 2017 Results:  Global and regional left ventricular function is normal with an EF of 55-60%.  Left ventricular wall thickness is normal.  Right ventricular function, chamber size, wall motion, and thickness are  normal.  No pericardial effusion is present.    Stress Test:  Date: Results:    ECG Reviewed:  Date: 2/20/23 Results:  NSR w/ early precordial R wave transition and nonspecific ST and T wave abnormality..  Cath:  Date: Results:      METS/Exercise Tolerance: 3 - Able to walk 1-2 blocks without stopping Comment: Uses cane or walker. Ambulates slowly.   Hematologic: Comments: Baseline Hgb in 9s (was in 7s following lumbar surgery last July).    (+)      anemia (chronic),       (-) history of blood clots and history of blood transfusion   Musculoskeletal: Comment: Lumbar stenosis, s/p L4-5 fusion 7/14/24  (+)  arthritis (knees),             GI/Hepatic: Comment: Chronic constipation     (-) GERD and liver disease   Renal/Genitourinary:    (-) renal disease   Endo:     (+)               Obesity,    (-) Type II DM   Psychiatric/Substance Use: Comment: PTSD    (+) psychiatric history bipolar, anxiety and depression   Recreational drug usage: Cocaine (history of).    Infectious Disease:  - neg infectious disease ROS     Malignancy:  - neg malignancy ROS     Other:  - neg other ROS          Virtual visit -  No vitals were obtained    Physical Exam  Constitutional: Awake, alert, cooperative, no apparent distress, and appears stated age.  HENT: Normocephalic  Respiratory: non labored breathing   Neurologic: Awake, alert,  oriented to name, place and time.   Neuropsychiatric: Calm, cooperative. Normal affect.      Prior Labs/Diagnostic Studies   All pertinent records, results, labs and imaging personally reviewed     EKG/ stress test - please see ROS above      LABS to be collected on 2/3/25:  CBC, iron studies, ferritin    Assessment    Kameron Park is a 59 year old female seen as a PAC referral for risk assessment and optimization for anesthesia.    Plan/Recommendations  Pt will be optimized for the proposed procedure.  See below for details on the assessment, risk, and preoperative recommendations    NEUROLOGY  - No history of TIA, CVA or seizure    -Post Op delirium risk factors:  No risk identified    ENT  - No current airway concerns.  Will need to be reassessed day of surgery.  Mallampati: Unable to assess  TM: Unable to assess    CARDIAC  - No history of CAD, Hypertension, and Afib  - Echo 2017:  EF 55-60%, normal function    - METS (Metabolic Equivalents)  Uses cane or walker. Ambulates slowly.    Patient CANNOT perform 4 METS exercise without symptoms             Total Score: 1    Functional Capacity: Unable to complete 4 METS      RCRI-Very low risk: Class 1 0.4% complication rate             Total Score: 0        PULMONARY  ARIADNA Low Risk  *This score is based on incomplete data *           Total Score: 2*    ARIADNA: BMI over 35 kg/m2    ARIADNA: Over 50 ys old    Criteria with incomplete data:    ARIADNA: Male      - Moderate persistent asthma, well controlled on Dulera bid. Pt reports symptoms improved significantly after quitting smoking in 2021.    - Tobacco History    History   Smoking Status    Former    Types: Cigarettes   Smokeless Tobacco    Never       GI  - Denies GERD  - Chronic constipation    PONV High Risk  Total Score: 3           1 AN PONV: Pt is Female    1 AN PONV: Patient is not a current smoker    1 AN PONV: Intended Post Op Opioids          ENDOCRINE    - BMI: Estimated body mass index is 38.96 kg/m  as  "calculated from the following:    Height as of this encounter: 1.626 m (5' 4\").    Weight as of this encounter: 103 kg (227 lb).  Obesity (BMI >30)  - No history of Diabetes Mellitus    HEME  VTE Low Risk 0.26%             Total Score: 0      - No history of abnormal bleeding or antiplatelet use.  - Chronic anemia, baseline Hgb has been in 9s for years (dropped to 7.7 following lumbar spine surgery last summer.  - Per staff message with Dr. Camarillo, expected blood loss for above surgery is estimated to be <100 mL.  - Discussed w/ Dr. Lopes. If current Hbg level is at pts baseline in 9s, the above surgery may proceed as scheduled at  (labs will be collected on 2/3).     MSK  Patient is NOT Frail             Total Score: 0      - Lumbar stenosis, s/p L4-5 fusion 7/14/24  - OA, knees    PSYCH  - anxiety, depression, bipolar - stable      The patient is aware that the final anesthesia plan will be decided by the assigned anesthesia provider on the date of service.      The patient is optimized for their procedure. AVS with information on surgery time/arrival time, meds and NPO status given by nursing staff. No further diagnostic testing indicated.    Please refer to the physical examination documented by the anesthesiologist in the anesthesia record on the day of surgery.    Video-Visit Details    Type of service:  Video Visit    Provider received verbal consent for a Video Visit from the patient? Yes   Video Start Time:  1329  Video End Time: 1339    Originating Location (pt. Location): Home    Distant Location (provider location):  Off-site  Mode of Communication:  Video Conference via Pluck  On the day of service:     Prep time: 14 minutes  Visit time: 10 minutes  Documentation time: 14 minutes  ------------------------------------------  Total time: 38 minutes      Jamila Nguyen PA-C  Preoperative Assessment Center  [facility]  [facility]  Phone: [phone]  Fax: " [phone]

## 2025-01-29 NOTE — TELEPHONE ENCOUNTER
Patient confirmed scheduled appointment:     Date: 2/3/25  Time: 2:00 PM  Visit type: Lab  Requested by provider: Jamila Nguyen  [place] [facility] [place]:

## 2025-01-29 NOTE — PROGRESS NOTES
Kameron is a 59 year old who is being evaluated via a billable video visit.    How would you like to obtain your AVS? MyChart  If the video visit is dropped, the invitation should be resent by: Text to cell phone: [phone]    Subjective   Kameron is a 59 year old, presenting for the following health issues:  Pre-Op Exam      HPI           Physical Exam

## 2025-02-03 ENCOUNTER — LAB (OUTPATIENT)
Dept: LAB | Facility: CLINIC | Age: 60
End: 2025-02-03
Payer: COMMERCIAL

## 2025-02-03 DIAGNOSIS — L98.7 EXCESS SKIN OF ABDOMEN: ICD-10-CM

## 2025-02-03 DIAGNOSIS — Z01.818 PREOP EXAMINATION: ICD-10-CM

## 2025-02-03 DIAGNOSIS — D64.9 ANEMIA, UNSPECIFIED TYPE: ICD-10-CM

## 2025-02-03 LAB
ERYTHROCYTE [DISTWIDTH] IN BLOOD BY AUTOMATED COUNT: 29.1 % (ref 10–15)
FERRITIN SERPL-MCNC: 338 NG/ML (ref 11–328)
HCT VFR BLD AUTO: 28.8 % (ref 35–47)
HGB BLD-MCNC: 10.1 G/DL (ref 11.7–15.7)
IRON BINDING CAPACITY (ROCHE): 227 UG/DL (ref 240–430)
IRON SATN MFR SERPL: 52 % (ref 15–46)
IRON SERPL-MCNC: 118 UG/DL (ref 37–145)
MCH RBC QN AUTO: 31.9 PG (ref 26.5–33)
MCHC RBC AUTO-ENTMCNC: 35.1 G/DL (ref 31.5–36.5)
MCV RBC AUTO: 91 FL (ref 78–100)
PLATELET # BLD AUTO: 205 10E3/UL (ref 150–450)
RBC # BLD AUTO: 3.17 10E6/UL (ref 3.8–5.2)
WBC # BLD AUTO: 6.1 10E3/UL (ref 4–11)

## 2025-02-03 PROCEDURE — 83540 ASSAY OF IRON: CPT | Performed by: PATHOLOGY

## 2025-02-03 PROCEDURE — 85027 COMPLETE CBC AUTOMATED: CPT | Performed by: PATHOLOGY

## 2025-02-03 PROCEDURE — 82728 ASSAY OF FERRITIN: CPT | Performed by: PATHOLOGY

## 2025-02-03 PROCEDURE — 36415 COLL VENOUS BLD VENIPUNCTURE: CPT | Performed by: PATHOLOGY

## 2025-02-03 PROCEDURE — 83550 IRON BINDING TEST: CPT | Performed by: PATHOLOGY

## 2025-02-12 ENCOUNTER — TELEPHONE (OUTPATIENT)
Dept: PLASTIC SURGERY | Facility: CLINIC | Age: 60
End: 2025-02-12

## 2025-02-12 ENCOUNTER — ANESTHESIA (OUTPATIENT)
Dept: SURGERY | Facility: AMBULATORY SURGERY CENTER | Age: 60
End: 2025-02-12
Payer: COMMERCIAL

## 2025-02-12 ENCOUNTER — HOSPITAL ENCOUNTER (OUTPATIENT)
Facility: AMBULATORY SURGERY CENTER | Age: 60
End: 2025-02-12
Attending: PLASTIC SURGERY
Payer: COMMERCIAL

## 2025-02-12 VITALS
DIASTOLIC BLOOD PRESSURE: 72 MMHG | BODY MASS INDEX: 38.76 KG/M2 | HEIGHT: 64 IN | WEIGHT: 227 LBS | SYSTOLIC BLOOD PRESSURE: 124 MMHG | TEMPERATURE: 97.7 F | HEART RATE: 66 BPM | RESPIRATION RATE: 12 BRPM | OXYGEN SATURATION: 97 %

## 2025-02-12 DIAGNOSIS — L98.7 EXCESS SKIN OF ABDOMEN: Primary | ICD-10-CM

## 2025-02-12 PROCEDURE — 88300 SURGICAL PATH GROSS: CPT | Mod: 26 | Performed by: PATHOLOGY

## 2025-02-12 PROCEDURE — 88300 SURGICAL PATH GROSS: CPT | Mod: TC | Performed by: PLASTIC SURGERY

## 2025-02-12 RX ORDER — FENTANYL CITRATE 50 UG/ML
25 INJECTION, SOLUTION INTRAMUSCULAR; INTRAVENOUS EVERY 5 MIN PRN
Status: DISCONTINUED | OUTPATIENT
Start: 2025-02-12 | End: 2025-02-12 | Stop reason: HOSPADM

## 2025-02-12 RX ORDER — NALOXONE HYDROCHLORIDE 0.4 MG/ML
0.4 INJECTION, SOLUTION INTRAMUSCULAR; INTRAVENOUS; SUBCUTANEOUS
Status: DISCONTINUED | OUTPATIENT
Start: 2025-02-12 | End: 2025-02-12 | Stop reason: HOSPADM

## 2025-02-12 RX ORDER — NALOXONE HYDROCHLORIDE 0.4 MG/ML
0.2 INJECTION, SOLUTION INTRAMUSCULAR; INTRAVENOUS; SUBCUTANEOUS
Status: DISCONTINUED | OUTPATIENT
Start: 2025-02-12 | End: 2025-02-12 | Stop reason: HOSPADM

## 2025-02-12 RX ORDER — ONDANSETRON 2 MG/ML
INJECTION INTRAMUSCULAR; INTRAVENOUS PRN
Status: DISCONTINUED | OUTPATIENT
Start: 2025-02-12 | End: 2025-02-12

## 2025-02-12 RX ORDER — LIDOCAINE 40 MG/G
CREAM TOPICAL
Status: DISCONTINUED | OUTPATIENT
Start: 2025-02-12 | End: 2025-02-12 | Stop reason: HOSPADM

## 2025-02-12 RX ORDER — DEXAMETHASONE SODIUM PHOSPHATE 10 MG/ML
4 INJECTION, SOLUTION INTRAMUSCULAR; INTRAVENOUS
Status: DISCONTINUED | OUTPATIENT
Start: 2025-02-12 | End: 2025-02-12 | Stop reason: HOSPADM

## 2025-02-12 RX ORDER — DEXAMETHASONE SODIUM PHOSPHATE 4 MG/ML
INJECTION, SOLUTION INTRA-ARTICULAR; INTRALESIONAL; INTRAMUSCULAR; INTRAVENOUS; SOFT TISSUE PRN
Status: DISCONTINUED | OUTPATIENT
Start: 2025-02-12 | End: 2025-02-12

## 2025-02-12 RX ORDER — OXYCODONE HYDROCHLORIDE 5 MG/1
5-10 TABLET ORAL EVERY 6 HOURS PRN
Qty: 20 TABLET | Refills: 0 | Status: SHIPPED | OUTPATIENT
Start: 2025-02-12

## 2025-02-12 RX ORDER — BUPIVACAINE HYDROCHLORIDE 2.5 MG/ML
INJECTION, SOLUTION EPIDURAL; INFILTRATION; INTRACAUDAL
Status: COMPLETED | OUTPATIENT
Start: 2025-02-12 | End: 2025-02-12

## 2025-02-12 RX ORDER — SODIUM CHLORIDE, SODIUM LACTATE, POTASSIUM CHLORIDE, CALCIUM CHLORIDE 600; 310; 30; 20 MG/100ML; MG/100ML; MG/100ML; MG/100ML
INJECTION, SOLUTION INTRAVENOUS CONTINUOUS PRN
Status: DISCONTINUED | OUTPATIENT
Start: 2025-02-12 | End: 2025-02-12

## 2025-02-12 RX ORDER — LIDOCAINE HYDROCHLORIDE 20 MG/ML
INJECTION, SOLUTION INFILTRATION; PERINEURAL PRN
Status: DISCONTINUED | OUTPATIENT
Start: 2025-02-12 | End: 2025-02-12

## 2025-02-12 RX ORDER — FLUMAZENIL 0.1 MG/ML
0.2 INJECTION, SOLUTION INTRAVENOUS
Status: DISCONTINUED | OUTPATIENT
Start: 2025-02-12 | End: 2025-02-12 | Stop reason: HOSPADM

## 2025-02-12 RX ORDER — PROPOFOL 10 MG/ML
INJECTION, EMULSION INTRAVENOUS PRN
Status: DISCONTINUED | OUTPATIENT
Start: 2025-02-12 | End: 2025-02-12

## 2025-02-12 RX ORDER — ONDANSETRON 4 MG/1
4 TABLET, ORALLY DISINTEGRATING ORAL EVERY 30 MIN PRN
Status: ACTIVE | OUTPATIENT
Start: 2025-02-12

## 2025-02-12 RX ORDER — FENTANYL CITRATE 50 UG/ML
25-50 INJECTION, SOLUTION INTRAMUSCULAR; INTRAVENOUS
Status: DISCONTINUED | OUTPATIENT
Start: 2025-02-12 | End: 2025-02-12 | Stop reason: HOSPADM

## 2025-02-12 RX ORDER — OXYCODONE HYDROCHLORIDE 5 MG/1
10 TABLET ORAL
Status: ACTIVE | OUTPATIENT
Start: 2025-02-12

## 2025-02-12 RX ORDER — ACETAMINOPHEN 325 MG/1
975 TABLET ORAL ONCE
Status: DISCONTINUED | OUTPATIENT
Start: 2025-02-12 | End: 2025-02-12 | Stop reason: HOSPADM

## 2025-02-12 RX ORDER — FENTANYL CITRATE 50 UG/ML
INJECTION, SOLUTION INTRAMUSCULAR; INTRAVENOUS PRN
Status: DISCONTINUED | OUTPATIENT
Start: 2025-02-12 | End: 2025-02-12

## 2025-02-12 RX ORDER — PROPOFOL 10 MG/ML
INJECTION, EMULSION INTRAVENOUS CONTINUOUS PRN
Status: DISCONTINUED | OUTPATIENT
Start: 2025-02-12 | End: 2025-02-12

## 2025-02-12 RX ORDER — HYDROMORPHONE HYDROCHLORIDE 1 MG/ML
0.4 INJECTION, SOLUTION INTRAMUSCULAR; INTRAVENOUS; SUBCUTANEOUS EVERY 5 MIN PRN
Status: DISCONTINUED | OUTPATIENT
Start: 2025-02-12 | End: 2025-02-12 | Stop reason: HOSPADM

## 2025-02-12 RX ORDER — CEFAZOLIN SODIUM 2 G/50ML
2 SOLUTION INTRAVENOUS
Status: COMPLETED | OUTPATIENT
Start: 2025-02-12 | End: 2025-02-12

## 2025-02-12 RX ORDER — HYDROMORPHONE HYDROCHLORIDE 1 MG/ML
0.2 INJECTION, SOLUTION INTRAMUSCULAR; INTRAVENOUS; SUBCUTANEOUS EVERY 5 MIN PRN
Status: DISCONTINUED | OUTPATIENT
Start: 2025-02-12 | End: 2025-02-12 | Stop reason: HOSPADM

## 2025-02-12 RX ORDER — AMOXICILLIN 250 MG
1-2 CAPSULE ORAL 2 TIMES DAILY
Qty: 30 TABLET | Refills: 0 | Status: SHIPPED | OUTPATIENT
Start: 2025-02-12

## 2025-02-12 RX ORDER — ONDANSETRON 4 MG/1
4 TABLET, ORALLY DISINTEGRATING ORAL EVERY 8 HOURS PRN
Qty: 4 TABLET | Refills: 0 | Status: SHIPPED | OUTPATIENT
Start: 2025-02-12

## 2025-02-12 RX ORDER — NALOXONE HYDROCHLORIDE 0.4 MG/ML
0.1 INJECTION, SOLUTION INTRAMUSCULAR; INTRAVENOUS; SUBCUTANEOUS
Status: DISCONTINUED | OUTPATIENT
Start: 2025-02-12 | End: 2025-02-12 | Stop reason: HOSPADM

## 2025-02-12 RX ORDER — LABETALOL HYDROCHLORIDE 5 MG/ML
10 INJECTION, SOLUTION INTRAVENOUS
Status: DISCONTINUED | OUTPATIENT
Start: 2025-02-12 | End: 2025-02-12 | Stop reason: HOSPADM

## 2025-02-12 RX ORDER — ONDANSETRON 2 MG/ML
4 INJECTION INTRAMUSCULAR; INTRAVENOUS EVERY 30 MIN PRN
Status: ACTIVE | OUTPATIENT
Start: 2025-02-12

## 2025-02-12 RX ORDER — ONDANSETRON 2 MG/ML
4 INJECTION INTRAMUSCULAR; INTRAVENOUS EVERY 30 MIN PRN
Status: DISCONTINUED | OUTPATIENT
Start: 2025-02-12 | End: 2025-02-12 | Stop reason: HOSPADM

## 2025-02-12 RX ORDER — CEFAZOLIN SODIUM 2 G/50ML
2 SOLUTION INTRAVENOUS SEE ADMIN INSTRUCTIONS
Status: DISCONTINUED | OUTPATIENT
Start: 2025-02-12 | End: 2025-02-12 | Stop reason: HOSPADM

## 2025-02-12 RX ORDER — SODIUM CHLORIDE, SODIUM LACTATE, POTASSIUM CHLORIDE, CALCIUM CHLORIDE 600; 310; 30; 20 MG/100ML; MG/100ML; MG/100ML; MG/100ML
INJECTION, SOLUTION INTRAVENOUS CONTINUOUS
Status: DISCONTINUED | OUTPATIENT
Start: 2025-02-12 | End: 2025-02-12 | Stop reason: HOSPADM

## 2025-02-12 RX ORDER — ONDANSETRON 4 MG/1
4 TABLET, ORALLY DISINTEGRATING ORAL EVERY 30 MIN PRN
Status: DISCONTINUED | OUTPATIENT
Start: 2025-02-12 | End: 2025-02-12 | Stop reason: HOSPADM

## 2025-02-12 RX ORDER — OXYCODONE HYDROCHLORIDE 5 MG/1
5 TABLET ORAL
Status: ACTIVE | OUTPATIENT
Start: 2025-02-12

## 2025-02-12 RX ORDER — FENTANYL CITRATE 50 UG/ML
50 INJECTION, SOLUTION INTRAMUSCULAR; INTRAVENOUS EVERY 5 MIN PRN
Status: DISCONTINUED | OUTPATIENT
Start: 2025-02-12 | End: 2025-02-12 | Stop reason: HOSPADM

## 2025-02-12 RX ORDER — DEXAMETHASONE SODIUM PHOSPHATE 10 MG/ML
4 INJECTION, SOLUTION INTRAMUSCULAR; INTRAVENOUS
Status: ACTIVE | OUTPATIENT
Start: 2025-02-12

## 2025-02-12 RX ORDER — NALOXONE HYDROCHLORIDE 0.4 MG/ML
0.1 INJECTION, SOLUTION INTRAMUSCULAR; INTRAVENOUS; SUBCUTANEOUS
Status: ACTIVE | OUTPATIENT
Start: 2025-02-12

## 2025-02-12 RX ADMIN — FENTANYL CITRATE 50 MCG: 50 INJECTION, SOLUTION INTRAMUSCULAR; INTRAVENOUS at 12:32

## 2025-02-12 RX ADMIN — Medication 0.5 MG: at 13:44

## 2025-02-12 RX ADMIN — PROPOFOL 50 MG: 10 INJECTION, EMULSION INTRAVENOUS at 13:21

## 2025-02-12 RX ADMIN — FENTANYL CITRATE 100 MCG: 50 INJECTION, SOLUTION INTRAMUSCULAR; INTRAVENOUS at 13:18

## 2025-02-12 RX ADMIN — Medication 0.5 MG: at 13:51

## 2025-02-12 RX ADMIN — PROPOFOL 200 MG: 10 INJECTION, EMULSION INTRAVENOUS at 13:19

## 2025-02-12 RX ADMIN — DEXAMETHASONE SODIUM PHOSPHATE 4 MG: 4 INJECTION, SOLUTION INTRA-ARTICULAR; INTRALESIONAL; INTRAMUSCULAR; INTRAVENOUS; SOFT TISSUE at 13:18

## 2025-02-12 RX ADMIN — SODIUM CHLORIDE, SODIUM LACTATE, POTASSIUM CHLORIDE, CALCIUM CHLORIDE: 600; 310; 30; 20 INJECTION, SOLUTION INTRAVENOUS at 13:08

## 2025-02-12 RX ADMIN — ONDANSETRON 4 MG: 2 INJECTION INTRAMUSCULAR; INTRAVENOUS at 14:47

## 2025-02-12 RX ADMIN — PROPOFOL 200 MCG/KG/MIN: 10 INJECTION, EMULSION INTRAVENOUS at 13:41

## 2025-02-12 RX ADMIN — CEFAZOLIN SODIUM 2 G: 2 SOLUTION INTRAVENOUS at 13:08

## 2025-02-12 RX ADMIN — LIDOCAINE HYDROCHLORIDE 100 MG: 20 INJECTION, SOLUTION INFILTRATION; PERINEURAL at 13:18

## 2025-02-12 RX ADMIN — PROPOFOL 150 MCG/KG/MIN: 10 INJECTION, EMULSION INTRAVENOUS at 13:20

## 2025-02-12 RX ADMIN — BUPIVACAINE HYDROCHLORIDE 20 ML: 2.5 INJECTION, SOLUTION EPIDURAL; INFILTRATION; INTRACAUDAL at 12:40

## 2025-02-12 RX ADMIN — PROPOFOL 200 MCG/KG/MIN: 10 INJECTION, EMULSION INTRAVENOUS at 14:33

## 2025-02-12 ASSESSMENT — LIFESTYLE VARIABLES: TOBACCO_USE: 1

## 2025-02-12 ASSESSMENT — ENCOUNTER SYMPTOMS: SEIZURES: 0

## 2025-02-12 NOTE — ANESTHESIA POSTPROCEDURE EVALUATION
Patient: Kameron Park    Procedure: Procedure(s):  PANNICULECTOMY       Anesthesia Type:  General    Note:  Disposition: Outpatient   Postop Pain Control: Uneventful            Sign Out: Well controlled pain   PONV: No   Neuro/Psych: Uneventful            Sign Out: Acceptable/Baseline neuro status   Airway/Respiratory: Uneventful            Sign Out: Acceptable/Baseline resp. status   CV/Hemodynamics: Uneventful            Sign Out: Acceptable CV status; No obvious hypovolemia; No obvious fluid overload   Other NRE: NONE   DID A NON-ROUTINE EVENT OCCUR? No           Last vitals:  Vitals Value Taken Time   /73 02/12/25 1550   Temp 36.4  C (97.6  F) 02/12/25 1550   Pulse 63 02/12/25 1550   Resp 16 02/12/25 1550   SpO2 98 % 02/12/25 1550       Electronically Signed By: Jordan Oliva MD  February 12, 2025  4:27 PM

## 2025-02-12 NOTE — DISCHARGE INSTRUCTIONS
ABDOMINOPLASTY and/or PANNICULECTOMY POST-OPERATIVE INSTRUCTIONS    General:   Have someone at home with you for the first 24 hours. You may need additional support for the first week.   Get plenty of rest and eat a balanced diet.   Drink plenty of fluids to help with healing and to help prevent constipation.    Avoid alcohol for a minimum of 3 weeks after surgery as it can cause fluid retention.   No smoking or any nicotine products.    Abdominoplasty is most painful the first 1-2 weeks. Pain will improve, soreness/tightness will persist for 6-8 weeks.   You will have bruising and swelling of the abdomen. The majority of bruising and swelling will subside in 6-8 weeks.    Activity:   Start walking around your house as soon as possible, this will help reduce swelling and decrease risk of blood clots   You will likely be bent over for the first week. It is normal for the incision area to feel tight. After 1 week you can start to stand upright as it feels more comfortable.   Turn to the side and push up with your arms when getting out of bed to avoid putting stress on the incision.  Sleep with pillows under knees and head elevated on 2 pillows for the first week to take tension off the incision.   Avoid strenuous activities or straining abdominal muscles, no working out or lifting greater than 10lbs for 6 weeks  Resume sexual activity as comfort permits, usually 2-3 weeks postoperatively.  In general, you can drive around 2 weeks post op. You must be off all narcotic pain medications and have full range of motion in your legs and no discomfort in your abdomen when lifting your legs.  No heavy lifting, nothing greater than 10lbs until 6 weeks post op.     Medications:    You can take Ibuprofen 400-800 mg and Tylenol 650 mg for pain relief. Please take each every 6 hours, and for optimal pain relief - please stagger the medications so that you are taking one or the other every 3 hours. It is best to make a  chart/schedule of this to stay organized. If you are taking additional pain medications, please do not exceed 4000 mg of Tylenol daily from all sources.   You have been prescribed additional pain meds such as narcotics and muscle relaxants, please take as instructed and as needed. Ok to take narcotics in addition to tylenol/ibuprofen.   If you are taking narcotic medications, please do not operate heavy machinery or drive.    Please take any antibiotics as prescribed     Incision Care:   Incisions have a tape and glue in place. Please leave in place for 2-3 weeks. If it starts to peel off on its own you can trim back the tape.   If you have skin staples these will be removed in 2-3 weeks in clinic  Ok to get incisions wet in the shower 48 hours post op. If you have drains it is ok to get drains wet in the shower. No soaking in tubs or baths for 6 weeks or until all incisions/wounds have healed.   Wear abdominal compression with a binder as directed, usually this is for 6 weeks. You can wear pads over the incision as needed for comfort.   Small amounts of drainage from incisions can be expected.     Drain Management:   If you have drains please strip, empty and record output twice a day. Bring your drain log to your post op appointment. This is needed so we know when the drain is ready to be removed.     Follow up:   Follow up with your Surgeon or Physician Assistant will be 7-14 days after discharge     When to call:   Please call the office and/or consider return to the ER if you experience worsening pain not relieved by medications, increased swelling, redness to skin or high fevers >101F or if there are unexpected problems like shortness of breath.   Contact us on ticckle Monday - Friday, 8 a.m. - 4:30 p.m. or call [phone] to speak with our nursing team   After hours and on weekends, call Hospital Paging at [phone], and ask for the Plastic Surgery Resident on call     [facility] and  "Procedure Center  Home Care Following Anesthesia  For 24 hours after surgery:  Get plenty of rest.  A responsible adult must stay with you for at least 24 hours after you leave the surgery center.  Do not drive or use heavy equipment.  If you have weakness or tingling, don't drive or use heavy equipment until this feeling goes away.   Do not drink alcohol.   Avoid strenuous or risky activities.  Ask for help when climbing stairs.  You may feel lightheaded.  IF so, sit for a few minutes before standing.  Have someone help you get up.   If you have nausea (feel sick to your stomach): Drink only clear liquids such as apple juice, ginger ale, broth or 7-Up.  Rest may also help.  Be sure to drink enough fluids.  Move to a regular diet as you feel able.   You may have a slight fever.  Call the doctor if your fever is over 100 F (37.7 C) (taken under the tongue) or lasts longer than 24 hours.  You may have a dry mouth, a sore throat, muscle aches or trouble sleeping. These should go away after 24 hours.  Do not make important or legal decisions.   It is recommended to avoid smoking.   Tips for taking pain medications  Today you received an Exparel block to numb the nerves near your surgery site.  This is a block using local anesthetic or \"numbing\" medication injected around the nerves to anesthetize or \"numb\" the area supplied by those nerves.  This block is injected into the muscle layer near your surgical site.  This medication may numb the location where you had surgery up to 72 hours.  If your surgical site is an arm or leg you should be careful with your affected limb, since it is possible to injure your limb without being aware of it due to the numbing.  Until full feeling returns, you should guard against bumping or hitting your limb, and avoid extreme hot or cold temperatures on the skin.  As the block wears off, the feeling will return as a tingling or prickly sensation near your surgical site.  You will experince " more discomfort from your incision as the feeling returns.  You may want to take a pain pill (a narcotic or Tylenol if this was prescribed by your surgeon) when you start to experience mild pain before the pain beomes more severe.  If your pain medications do not control your pain, you should notify your surgeon.    To get the best pain relief possible, remember these points:  Take pain medications as directed, before pain becomes severe.  Pain medication can upset your stomach: taking it with food may help.  Constipation is a common side effect of pain medication. Drink plenty of  fluids.  Eat foods high in fiber. Take a stool softener if recommended by your doctor or pharmacist.  Do not drink alcohol, drive or operate machinery while taking pain medications.  Ask about other ways to control pain, such as with heat, ice or relaxation.    Tylenol/Acetaminophen Consumption  If you feel your pain relief is insufficient, you may take Tylenol/Acetaminophen in addition to your narcotic pain medication.   Be careful not to exceed 4,000 mg of Tylenol/Acetaminophen in a 24 hour period from all sources.  If you are taking extra strength Tylenol/acetaminophen (500 mg), the maximum dose is 8 tablets in 24 hours.  If you are taking regular strength acetaminophen (325 mg), the maximum dose is 12 tablets in 24 hours.    Call a doctor for any of the following:  Signs of infection (fever, growing tenderness at the surgery site, a large amount of drainage or bleeding, severe pain, foul-smelling drainage, redness, swelling).  It has been over 8 to 10 hours since surgery and you are still not able to urinate (pass water).  Headache for over 24 hours.    Your doctor is:  Dr. Marlen Camarillo, Plastic Surgery: [phone]              After hours and weekends call the hospital @ [phone] and ask for the resident on call for:  Plastics  For emergency care, call the:  [place] [facility]:  [phone] (TTY for hearing  impaired: [phone])

## 2025-02-12 NOTE — ANESTHESIA PROCEDURE NOTES
Airway       Patient location during procedure: OR  Staff -        CRNA: Kendy Brown APRN CRNA       Performed By: CRNA  Consent for Airway        Urgency: elective  Indications and Patient Condition       Indications for airway management: margaux-procedural       Induction type:intravenous       Mask difficulty assessment: 1 - vent by mask    Final Airway Details       Final airway type: supraglottic airway    Supraglottic Airway Details        Type: LMA       Brand: I-Gel       LMA size: 5    Post intubation assessment        Placement verified by: capnometry, equal breath sounds and chest rise        Number of attempts at approach: 1       Secured with: tape       Ease of procedure: easy       Dentition: Intact and Unchanged

## 2025-02-12 NOTE — BRIEF OP NOTE
[facility]    Brief Operative Note    Pre-operative diagnosis: Excess skin of abdomen [L98.7]  Post-operative diagnosis Same as pre-operative diagnosis    Procedure: PANNICULECTOMY, N/A - Abdomen    Surgeon: Surgeons and Role:     * SHANE Camarillo MD - Primary     * Danette Vergara MD - Resident - Assisting     * Nba Daly MD - Fellow - Assisting  Anesthesia: General with Block   Estimated Blood Loss: 50 ml    Drains: Fili-Mckeon x2  Specimens:   ID Type Source Tests Collected by Time Destination   1 : Panniculus Tissue Panniculus SURGICAL PATHOLOGY EXAM SHANE Camarillo MD 2/12/2025  2:19 PM      Findings:   Panniculus = 4717 g .  Complications: None.  Implants: * No implants in log *      Danette Maldonado MD  Plastic Surgery Resident, PGY-1

## 2025-02-12 NOTE — ANESTHESIA PROCEDURE NOTES
TAP Procedure Note    Pre-Procedure   Staff -        Anesthesiologist:  Abby Marcano MD       Resident/Fellow: Timothy Suarez MD       Performed By: resident       Location: pre-op       Pre-Anesthestic Checklist: patient identified, IV checked, site marked, risks and benefits discussed, informed consent, monitors and equipment checked, pre-op evaluation, at physician/surgeon's request and post-op pain management  Timeout:       Correct Patient: Yes        Correct Procedure: Yes        Correct Site: Yes        Correct Position: Yes        Correct Laterality: Yes        Site Marked: Yes  Procedure Documentation  Procedure: TAP         Diagnosis: POSTOPERATIVE ANALGESIA       Laterality: bilateral       Patient Position: supine       Patient Prep/Sterile Barriers: sterile gloves, mask       Skin prep: DuraPrep and Chloraprep       Needle Type: short bevel       Needle Gauge: 21.        Needle Length (millimeters): 110        Ultrasound guided       1. Ultrasound was used to identify targeted nerve, plexus, vascular marker, or fascial plane and place a needle adjacent to it in real-time.       2. Ultrasound was used to visualize the spread of anesthetic in close proximity to the above referenced structure.       3. A permanent image is entered into the patient's record.    Assessment/Narrative         The placement was negative for: blood aspirated, painful injection and site bleeding       Paresthesias: No.       Bolus given via needle. no blood aspirated via catheter.        Secured via.        Insertion/Infusion Method: Single Shot       Complications: none       Injection made incrementally with aspirations every 5 mL.    Medication(s) Administered   Bupivacaine 0.25% PF (Infiltration) - Infiltration   20 mL - 2/12/2025 12:40:00 PM  Bupivacaine liposome (Exparel) 1.3% LA inj susp (Infiltration) - Infiltration   20 mL - 2/12/2025 12:41:00 PM   Comments:  Injected 266 mg of Exparel      FOR [facility]  "([facility]) ONLY:   Pain Team Contact information: please page the Pain Team Via Bocom. Search \"Pain\". During daytime hours, please page the attending first. At night please page the resident first.      "

## 2025-02-12 NOTE — ANESTHESIA CARE TRANSFER NOTE
Patient: Kameron Park    Procedure: Procedure(s):  PANNICULECTOMY       Diagnosis: Excess skin of abdomen [L98.7]  Diagnosis Additional Information: No value filed.    Anesthesia Type:   General     Note:    Oropharynx: spontaneously breathing and oropharynx clear of all foreign objects  Level of Consciousness: drowsy  Oxygen Supplementation: face mask  Level of Supplemental Oxygen (L/min / FiO2): 6  Independent Airway: airway patency satisfactory and stable  Dentition: dentition unchanged  Vital Signs Stable: post-procedure vital signs reviewed and stable  Report to RN Given: handoff report given  Patient transferred to: PACU    Handoff Report: Identifed the Patient, Identified the Reponsible Provider, Reviewed the pertinent medical history, Discussed the surgical course, Reviewed Intra-OP anesthesia mangement and issues during anesthesia, Set expectations for post-procedure period and Allowed opportunity for questions and acknowledgement of understanding      Vitals:  Vitals Value Taken Time   /73 02/12/25 1509   Temp 36.3  C (97.4  F) 02/12/25 1509   Pulse 64 02/12/25 1509   Resp 12 02/12/25 1509   SpO2 100 % 02/12/25 1509       Electronically Signed By: BHAVIN Travis CRNA  February 12, 2025  3:12 PM

## 2025-02-12 NOTE — OR NURSING
Patient received bilateral Transverse Abdominis Plane nerve block  with Exparel.  Fentanyl 50mcg and Versed 1mg given. Tolerated procedure well.

## 2025-02-12 NOTE — TELEPHONE ENCOUNTER
[facility] Call Center    Phone Message    May a detailed message be left on voicemail: yes     Reason for Call: Other: Pt's child returning a call for nurse Emerson. Requesting call back.      Action Taken: Other: plast surg     Travel Screening: Not Applicable     Date of Service:

## 2025-02-12 NOTE — ANESTHESIA PREPROCEDURE EVALUATION
Anesthesia Pre-Procedure Evaluation    Patient: Kameron Park   MRN: [id number] : 1965        Procedure : Procedure(s):  PANNICULECTOMY          Past Medical History:   Diagnosis Date     Anemia      Asthma      Bipolar disorder (H)      Borderline personality disorder (H)      Depression      Excess skin of abdomen 2025     History of lumbar fusion 2024    L4-5     Obesity      PTSD (post-traumatic stress disorder)      TBI (traumatic brain injury) (H)      Urinary incontinence       Past Surgical History:   Procedure Laterality Date     LEFT CARPAL TUNNEL RELEASE   2009     MAMMOPLASTY REDUCTION BILATERAL  2012     MINIMALLY INVASIVE TRANSFORAMINAL LUMBAR INTERBODY FUSION FIXATION L4-L5 WITH NEURO MONITORING  2024     Right carpal tunnel release   2009      Allergies   Allergen Reactions     Fish-Derived Products Anaphylaxis     Seafood Swelling     Beta Adrenergic Blockers Other (See Comments)      Contradiction to allergy shots     Pollen Extract Other (See Comments)     Seasonal Allergies      Other reaction(s): Runny Nose      Social History     Tobacco Use     Smoking status: Former     Current packs/day: 0.10     Types: Cigarettes     Smokeless tobacco: Never     Tobacco comments:     Quit a couple years ago   Substance Use Topics     Alcohol use: Not Currently      Wt Readings from Last 1 Encounters:   25 103 kg (227 lb)        Anesthesia Evaluation   Pt has had prior anesthetic. Type: General and MAC.        ROS/MED HX  ENT/Pulmonary:     (+)                tobacco use (Quit ), Past use,  9  Pack-Year Hx,   Moderate Persistent, asthma (taking Dulera bid)  Treatment: Inhaler daily,              (-) sleep apnea and recent URI   Neurologic: Comment: TBI , denies deficits     (-) no seizures and no CVA   Cardiovascular:     (+)  - -   -  - -                           valvular problems/murmurs  known murmur.    Previous cardiac testing   Echo: Date:  Results:  Global  and regional left ventricular function is normal with an EF of 55-60%.  Left ventricular wall thickness is normal.  Right ventricular function, chamber size, wall motion, and thickness are  normal.  No pericardial effusion is present.    Stress Test:  Date: Results:    ECG Reviewed:  Date: 2/20/23 Results:  NSR w/ early precordial R wave transition and nonspecific ST and T wave abnormality..  Cath:  Date: Results:      METS/Exercise Tolerance: 3 - Able to walk 1-2 blocks without stopping Comment: Uses cane or walker. Ambulates slowly.   Hematologic: Comments: Baseline Hgb in 9s (was in 7s following lumbar surgery last July).    (+)      anemia (chronic),       (-) history of blood clots and history of blood transfusion   Musculoskeletal: Comment: Lumbar stenosis, s/p L4-5 fusion 7/14/24  (+)  arthritis (knees),             GI/Hepatic: Comment: Chronic constipation     (-) GERD and liver disease   Renal/Genitourinary:    (-) renal disease   Endo:     (+)               Obesity,    (-) Type II DM   Psychiatric/Substance Use: Comment: PTSD    (+) psychiatric history bipolar, anxiety and depression   Recreational drug usage: Cocaine (history of).    Infectious Disease:  - neg infectious disease ROS     Malignancy:  - neg malignancy ROS     Other:  - neg other ROS          Physical Exam    Airway        Mallampati: I   TM distance: > 3 FB   Neck ROM: full   Mouth opening: > 3 cm    Respiratory Devices and Support         Dental       (+) Modest Abnormalities - crowns, retainers, 1 or 2 missing teeth      Cardiovascular   cardiovascular exam normal       Rhythm and rate: regular and normal     Pulmonary   pulmonary exam normal        breath sounds clear to auscultation       OUTSIDE LABS:  CBC:   Lab Results   Component Value Date    WBC 6.1 02/03/2025    HGB 10.1 (L) 02/03/2025    HCT 28.8 (L) 02/03/2025     02/03/2025     BMP:   Lab Results   Component Value Date     01/27/2025     08/15/2017     "POTASSIUM 4.5 01/27/2025    POTASSIUM 4.3 08/15/2017    CHLORIDE 107 01/27/2025    CHLORIDE 109 08/15/2017    CO2 25 01/27/2025    CO2 23 08/15/2017    BUN 12.0 01/27/2025    BUN 10 08/15/2017    CR 0.80 01/27/2025    CR 0.84 08/15/2017    GLC 96 01/27/2025    GLC 86 08/15/2017     COAGS: No results found for: \"PTT\", \"INR\", \"FIBR\"  POC: No results found for: \"BGM\", \"HCG\", \"HCGS\"  HEPATIC: No results found for: \"ALBUMIN\", \"PROTTOTAL\", \"ALT\", \"AST\", \"GGT\", \"ALKPHOS\", \"BILITOTAL\", \"BILIDIRECT\", \"CAT\"  OTHER:   Lab Results   Component Value Date    STEPHEN 8.9 01/27/2025       Anesthesia Plan    ASA Status:  3    NPO Status:  NPO Appropriate    Anesthesia Type: General.     - Airway: ETT   Induction: Intravenous, Propofol.   Maintenance: TIVA.        Consents    Anesthesia Plan(s) and associated risks, benefits, and realistic alternatives discussed. Questions answered and patient/representative(s) expressed understanding.     - Discussed:     - Discussed with:  Patient            Postoperative Care    Pain management: IV analgesics, Oral pain medications, Multi-modal analgesia, Peripheral nerve block (Single Shot).   PONV prophylaxis: Ondansetron (or other 5HT-3), Dexamethasone or Solumedrol, Background Propofol Infusion     Comments:               Timothy Suarez MD    I have reviewed the pertinent notes and labs in the chart from the past 30 days and (re)examined the patient.  Any updates or changes from those notes are reflected in this note.    Clinically Significant Risk Factors Present on Admission                   # Hypertension: Noted on problem list           # Obesity: Estimated body mass index is 38.96 kg/m  as calculated from the following:    Height as of 1/29/25: 1.626 m (5' 4\").    Weight as of 1/29/25: 103 kg (227 lb).       # Asthma: noted on problem list          "

## 2025-02-12 NOTE — OP NOTE
PREOPERATIVE DIAGNOSIS: Massive weight loss with symptomatic abdominal panniculus.     POSTOPERATIVE DIAGNOSIS: Massive weight loss with symptomatic abdominal panniculus.     SURGEON: Marlen Camarillo MD     FELLOW: Nba Daly MD     RESIDENT: Danette Dorado MD    PROCEDURE:   1. Panniculectomy.     ANESTHESIA: General anesthesia with LMA    COMPLICATIONS: Nil.     DRAINS: Two 15-Citizen of [place] Amaury drains.     SPECIMENS: Skin and fat from abdomen, measuring about 5 kg.    Blood loss: 50 cc     DESCRIPTION OF PROCEDURE: After informed consent was taken from the patient,  the proper site and procedure were ascertained with her, and she was appropriately marked, and she was taken to the operating room. She was placed in a supine position with her knees comfortably flexed, pillows underneath them, and pneumoboots placed and running prior to induction of anesthesia. Preoperative antibiotics were given in the OR. All pressure points were appropriately padded. General anesthesia was administered without any complications. She was placed in a supine position, but arms abducted to about 50 degrees, such that she could be flexed to about 40-50 degrees. We went ahead and prepped and draped in a standard surgical fashion. Went ahead and marked out her inferior incision which was about 7 cm from the external genitalia in the midline, and then within that mons area, we marked out a horizontal line while tractioning up on the mons for about 8 cm on either side, and then pulling up on the lateral tissues, we marked out the anterior superior iliac spine, and then marked out a line that joined the lateral extent of the mons marking to the lateral extent of our dissection that I had marked out preoperatively, passing through the plane of the anterior superior iliac spine on each side. Once I was happy with the markings, we began. We made an incision, dissected down through the subcutaneous tissues, elevated the skin flap in a cephalad  direction superficial to the Aldo's layer until I was outside the groin, and then I dove deep to the Aldo's layer and elevated the skin flaps cephalad anterior to the anterior abdominal wall, keeping a thin layer of areolar tissue on the anterior abdominal wall. All of this was done to decrease seroma formation. Dissection of this flap was then carried out towards the umbilicus. Once the umbilical plane was approached, the dissection was carried around the umbilicus all the way up to the superior markings that I had made preoperatively.  The umbilical stalk was cut and reinforced with 0 PDS suture.  I then  went ahead and marked out the excess skin and excised it, again ensuring hemostasis. I then placed 2 15Fr channeled PASTORA drains in the surgical site. I then began the closure portion. I closed the skin flap using #0 PDS suture in an interrupted fashion for the superficial fascial system, and then 2-0 Monocryl suture in a deep dermal layer, followed by 3-0 Stratafix suture in a running intracuticular manner.  Surgical tape and glue was used to close all of the incisions. An abdominal binder placed over the abdomen. The patient tolerated the procedure well. All counts were correct at the end of the case. The patient was then extubated and sent to the recovery room.

## 2025-02-13 LAB
PATH REPORT.COMMENTS IMP SPEC: NORMAL
PATH REPORT.COMMENTS IMP SPEC: NORMAL
PATH REPORT.GROSS SPEC: NORMAL
PATH REPORT.RELEVANT HX SPEC: NORMAL
PHOTO IMAGE: NORMAL

## 2025-02-19 ENCOUNTER — OFFICE VISIT (OUTPATIENT)
Dept: PLASTIC SURGERY | Facility: CLINIC | Age: 60
End: 2025-02-19
Payer: COMMERCIAL

## 2025-02-19 VITALS
WEIGHT: 239.8 LBS | OXYGEN SATURATION: 100 % | HEIGHT: 64 IN | HEART RATE: 67 BPM | SYSTOLIC BLOOD PRESSURE: 120 MMHG | BODY MASS INDEX: 40.94 KG/M2 | DIASTOLIC BLOOD PRESSURE: 79 MMHG | TEMPERATURE: 98.3 F

## 2025-02-19 DIAGNOSIS — L98.7 EXCESS SKIN OF ABDOMEN: Primary | ICD-10-CM

## 2025-02-19 ASSESSMENT — PAIN SCALES - GENERAL: PAINLEVEL_OUTOF10: SEVERE PAIN (8)

## 2025-02-19 NOTE — NURSING NOTE
"Chief Complaint   Patient presents with    Surgical Followup     1 week post-op, DOS 2/12/25.       Vitals:    02/19/25 0910   BP: 120/79   BP Location: Left arm   Patient Position: Sitting   Cuff Size: Adult Large   Pulse: 67   Temp: 98.3  F (36.8  C)   TempSrc: Oral   SpO2: 100%   Weight: 239 lb 12.8 oz   Height: 5' 4\"       Body mass index is 41.16 kg/m .    Patient reports severe pain along abdominal incision (8/10).    Tod Beebe, EMT    "

## 2025-02-19 NOTE — LETTER
2/19/2025       RE: Kameron Park  [address]  [facility] [address]     Dear Colleague,    Thank you for referring your patient, Kameron Park, to the [facility] PLASTIC AND RECONSTRUCTIVE SURGERY CLINIC [place] at [facility]. Please see a copy of my visit note below.    PRESENTING COMPLAINT:  Post-operative visit s/p panniculectomy done on 2/12/2025     HISTORY OF PRESENTING COMPLAINT: The patient is here for post-operative visit.  The patient is being seen in the presence of my nurse.     Overall doing well.  PASTORA drainage seems to be more than 50 cc a day from each drain however the patient's drains were not engaged.    On exam: Vital signs stable afebrile.  No evidence for infection.  PASTORA drainage is dark but serous.    I engaged the drains and about 600 cc of fluid was removed from within the surgical site.  And otherwise she had an undrained seroma due to the fact that the drains were only to gravity and not to suction.     ASSESSMENT AND PLAN:  Based upon the above findings, the patient is here for post-operative visit.     I have advised the patient to continue to keep the bulbs engaged at all times.  Reassured her.  Will see her back in a week's time.  She will moisturize her incisions.  She will be up and about but no heavy lifting or exercising.  She will shower every day.    All questions were answered.  The patient was happy with the visit.      Again, thank you for allowing me to participate in the care of your patient.      Sincerely,    SHANE Camarillo MD

## 2025-02-19 NOTE — PROGRESS NOTES
PRESENTING COMPLAINT:  Post-operative visit s/p panniculectomy done on 2/12/2025     HISTORY OF PRESENTING COMPLAINT: The patient is here for post-operative visit.  The patient is being seen in the presence of my nurse.     Overall doing well.  PASTORA drainage seems to be more than 50 cc a day from each drain however the patient's drains were not engaged.    On exam: Vital signs stable afebrile.  No evidence for infection.  PASTORA drainage is dark but serous.    I engaged the drains and about 600 cc of fluid was removed from within the surgical site.  And otherwise she had an undrained seroma due to the fact that the drains were only to gravity and not to suction.     ASSESSMENT AND PLAN:  Based upon the above findings, the patient is here for post-operative visit.     I have advised the patient to continue to keep the bulbs engaged at all times.  Reassured her.  Will see her back in a week's time.  She will moisturize her incisions.  She will be up and about but no heavy lifting or exercising.  She will shower every day.    All questions were answered.  The patient was happy with the visit.

## 2025-02-26 ENCOUNTER — OFFICE VISIT (OUTPATIENT)
Dept: PLASTIC SURGERY | Facility: CLINIC | Age: 60
End: 2025-02-26
Payer: COMMERCIAL

## 2025-02-26 VITALS
TEMPERATURE: 97.6 F | HEART RATE: 72 BPM | SYSTOLIC BLOOD PRESSURE: 102 MMHG | BODY MASS INDEX: 39.78 KG/M2 | HEIGHT: 64 IN | OXYGEN SATURATION: 100 % | WEIGHT: 233 LBS | DIASTOLIC BLOOD PRESSURE: 60 MMHG

## 2025-02-26 DIAGNOSIS — L98.7 EXCESS SKIN OF ABDOMEN: Primary | ICD-10-CM

## 2025-02-26 ASSESSMENT — PAIN SCALES - GENERAL: PAINLEVEL_OUTOF10: MODERATE PAIN (6)

## 2025-02-26 NOTE — PROGRESS NOTES
PRESENTING COMPLAINT:  Post-operative visit s/p panniculectomy done on 2/12/2025     HISTORY OF PRESENTING COMPLAINT: The patient is here for post-operative visit.  The patient is being seen in the presence of my nurse.      Overall doing well.  PASTORA drainage seems to be more than 50 cc a day from each drain.     On exam: Vital signs stable afebrile.  No evidence for infection.       ASSESSMENT AND PLAN:  Based upon the above findings, the patient is here for post-operative visit.      I have advised the patient to continue to keep the bulbs engaged at all times.  Reassured her.  Will see her back in a week's time.  She will moisturize her incisions.  She will be up and about but no heavy lifting or exercising.  She will shower every day.     All questions were answered.  The patient was happy with the visit.

## 2025-02-26 NOTE — NURSING NOTE
"Chief Complaint   Patient presents with    CAROLINE Melo, is being seen today for a drain check/removal.       Vitals:    02/26/25 1116   BP: 102/60   BP Location: Left arm   Patient Position: Chair   Cuff Size: Adult Large   Pulse: 72   Temp: 97.6  F (36.4  C)   TempSrc: Oral   SpO2: 100%   Weight: 105.7 kg (233 lb)   Height: 1.626 m (5' 4\")       Body mass index is 39.99 kg/m .      Opal Juan LPN    "

## 2025-02-26 NOTE — LETTER
2/26/2025       RE: Kameron Park  [address]  [facility] [address]     Dear Colleague,    Thank you for referring your patient, Kameron Park, to the [facility] PLASTIC AND RECONSTRUCTIVE SURGERY CLINIC [place] at [facility]. Please see a copy of my visit note below.    PRESENTING COMPLAINT:  Post-operative visit s/p panniculectomy done on 2/12/2025     HISTORY OF PRESENTING COMPLAINT: The patient is here for post-operative visit.  The patient is being seen in the presence of my nurse.      Overall doing well.  PASTORA drainage seems to be more than 50 cc a day from each drain.     On exam: Vital signs stable afebrile.  No evidence for infection.       ASSESSMENT AND PLAN:  Based upon the above findings, the patient is here for post-operative visit.      I have advised the patient to continue to keep the bulbs engaged at all times.  Reassured her.  Will see her back in a week's time.  She will moisturize her incisions.  She will be up and about but no heavy lifting or exercising.  She will shower every day.     All questions were answered.  The patient was happy with the visit.      Again, thank you for allowing me to participate in the care of your patient.      Sincerely,    SHANE Camarillo MD

## 2025-03-02 ENCOUNTER — HOSPITAL ENCOUNTER (EMERGENCY)
Facility: CLINIC | Age: 60
Discharge: HOME | End: 2025-03-02
Attending: STUDENT IN AN ORGANIZED HEALTH CARE EDUCATION/TRAINING PROGRAM | Admitting: STUDENT IN AN ORGANIZED HEALTH CARE EDUCATION/TRAINING PROGRAM
Payer: COMMERCIAL

## 2025-03-02 ENCOUNTER — APPOINTMENT (OUTPATIENT)
Dept: CT IMAGING | Facility: CLINIC | Age: 60
End: 2025-03-02
Attending: STUDENT IN AN ORGANIZED HEALTH CARE EDUCATION/TRAINING PROGRAM
Payer: COMMERCIAL

## 2025-03-02 VITALS
TEMPERATURE: 98.2 F | BODY MASS INDEX: 39.78 KG/M2 | RESPIRATION RATE: 20 BRPM | SYSTOLIC BLOOD PRESSURE: 137 MMHG | DIASTOLIC BLOOD PRESSURE: 70 MMHG | HEART RATE: 89 BPM | OXYGEN SATURATION: 98 % | HEIGHT: 64 IN | WEIGHT: 233 LBS

## 2025-03-02 DIAGNOSIS — T14.8XXA WOUND DRAINAGE: ICD-10-CM

## 2025-03-02 LAB
ANION GAP SERPL CALCULATED.3IONS-SCNC: 11 MMOL/L (ref 7–15)
BASOPHILS # BLD AUTO: 0.1 10E3/UL (ref 0–0.2)
BASOPHILS NFR BLD AUTO: 2 %
BUN SERPL-MCNC: 4.1 MG/DL (ref 6–20)
CALCIUM SERPL-MCNC: 8.1 MG/DL (ref 8.8–10.4)
CHLORIDE SERPL-SCNC: 105 MMOL/L (ref 98–107)
CREAT SERPL-MCNC: 0.65 MG/DL (ref 0.51–0.95)
CRP SERPL-MCNC: 124 MG/L
EGFRCR SERPLBLD CKD-EPI 2021: >90 ML/MIN/1.73M2
EOSINOPHIL # BLD AUTO: 0.3 10E3/UL (ref 0–0.7)
EOSINOPHIL NFR BLD AUTO: 6 %
ERYTHROCYTE [DISTWIDTH] IN BLOOD BY AUTOMATED COUNT: 27 % (ref 10–15)
GLUCOSE SERPL-MCNC: 116 MG/DL (ref 70–99)
HCO3 SERPL-SCNC: 23 MMOL/L (ref 22–29)
HCT VFR BLD AUTO: 23.4 % (ref 35–47)
HGB BLD-MCNC: 7.9 G/DL (ref 11.7–15.7)
IMM GRANULOCYTES # BLD: 0 10E3/UL
IMM GRANULOCYTES NFR BLD: 1 %
LYMPHOCYTES # BLD AUTO: 1.3 10E3/UL (ref 0.8–5.3)
LYMPHOCYTES NFR BLD AUTO: 26 %
MCH RBC QN AUTO: 31.3 PG (ref 26.5–33)
MCHC RBC AUTO-ENTMCNC: 33.8 G/DL (ref 31.5–36.5)
MCV RBC AUTO: 93 FL (ref 78–100)
MONOCYTES # BLD AUTO: 1.2 10E3/UL (ref 0–1.3)
MONOCYTES NFR BLD AUTO: 24 %
NEUTROPHILS # BLD AUTO: 2.1 10E3/UL (ref 1.6–8.3)
NEUTROPHILS NFR BLD AUTO: 42 %
NRBC # BLD AUTO: 0 10E3/UL
NRBC BLD AUTO-RTO: 0 /100
PLATELET # BLD AUTO: 452 10E3/UL (ref 150–450)
POTASSIUM SERPL-SCNC: 3.5 MMOL/L (ref 3.4–5.3)
RBC # BLD AUTO: 2.52 10E6/UL (ref 3.8–5.2)
SODIUM SERPL-SCNC: 139 MMOL/L (ref 135–145)
WBC # BLD AUTO: 5.1 10E3/UL (ref 4–11)

## 2025-03-02 PROCEDURE — 85004 AUTOMATED DIFF WBC COUNT: CPT | Performed by: STUDENT IN AN ORGANIZED HEALTH CARE EDUCATION/TRAINING PROGRAM

## 2025-03-02 PROCEDURE — 80048 BASIC METABOLIC PNL TOTAL CA: CPT | Performed by: STUDENT IN AN ORGANIZED HEALTH CARE EDUCATION/TRAINING PROGRAM

## 2025-03-02 PROCEDURE — 99284 EMERGENCY DEPT VISIT MOD MDM: CPT | Performed by: STUDENT IN AN ORGANIZED HEALTH CARE EDUCATION/TRAINING PROGRAM

## 2025-03-02 PROCEDURE — 250N000011 HC RX IP 250 OP 636: Performed by: STUDENT IN AN ORGANIZED HEALTH CARE EDUCATION/TRAINING PROGRAM

## 2025-03-02 PROCEDURE — 96374 THER/PROPH/DIAG INJ IV PUSH: CPT | Mod: 59 | Performed by: STUDENT IN AN ORGANIZED HEALTH CARE EDUCATION/TRAINING PROGRAM

## 2025-03-02 PROCEDURE — 99285 EMERGENCY DEPT VISIT HI MDM: CPT | Mod: 25 | Performed by: STUDENT IN AN ORGANIZED HEALTH CARE EDUCATION/TRAINING PROGRAM

## 2025-03-02 PROCEDURE — 74177 CT ABD & PELVIS W/CONTRAST: CPT | Mod: 26 | Performed by: RADIOLOGY

## 2025-03-02 PROCEDURE — 250N000009 HC RX 250: Performed by: STUDENT IN AN ORGANIZED HEALTH CARE EDUCATION/TRAINING PROGRAM

## 2025-03-02 PROCEDURE — 74177 CT ABD & PELVIS W/CONTRAST: CPT

## 2025-03-02 PROCEDURE — 86140 C-REACTIVE PROTEIN: CPT | Performed by: STUDENT IN AN ORGANIZED HEALTH CARE EDUCATION/TRAINING PROGRAM

## 2025-03-02 PROCEDURE — 96375 TX/PRO/DX INJ NEW DRUG ADDON: CPT | Performed by: STUDENT IN AN ORGANIZED HEALTH CARE EDUCATION/TRAINING PROGRAM

## 2025-03-02 PROCEDURE — 36415 COLL VENOUS BLD VENIPUNCTURE: CPT | Performed by: STUDENT IN AN ORGANIZED HEALTH CARE EDUCATION/TRAINING PROGRAM

## 2025-03-02 PROCEDURE — 85049 AUTOMATED PLATELET COUNT: CPT | Performed by: STUDENT IN AN ORGANIZED HEALTH CARE EDUCATION/TRAINING PROGRAM

## 2025-03-02 RX ORDER — SULFAMETHOXAZOLE AND TRIMETHOPRIM 800; 160 MG/1; MG/1
1 TABLET ORAL 2 TIMES DAILY
Qty: 20 TABLET | Refills: 0 | Status: SHIPPED | OUTPATIENT
Start: 2025-03-02 | End: 2025-03-12

## 2025-03-02 RX ORDER — IOPAMIDOL 755 MG/ML
136 INJECTION, SOLUTION INTRAVASCULAR ONCE
Status: COMPLETED | OUTPATIENT
Start: 2025-03-02 | End: 2025-03-02

## 2025-03-02 RX ORDER — CEPHALEXIN 500 MG/1
500 CAPSULE ORAL 4 TIMES DAILY
Qty: 40 CAPSULE | Refills: 0 | Status: SHIPPED | OUTPATIENT
Start: 2025-03-02 | End: 2025-03-12

## 2025-03-02 RX ORDER — ONDANSETRON 2 MG/ML
4 INJECTION INTRAMUSCULAR; INTRAVENOUS ONCE
Status: COMPLETED | OUTPATIENT
Start: 2025-03-02 | End: 2025-03-02

## 2025-03-02 RX ORDER — MORPHINE SULFATE 4 MG/ML
4 INJECTION, SOLUTION INTRAMUSCULAR; INTRAVENOUS ONCE
Status: COMPLETED | OUTPATIENT
Start: 2025-03-02 | End: 2025-03-02

## 2025-03-02 RX ADMIN — SODIUM CHLORIDE, PRESERVATIVE FREE 88 ML: 5 INJECTION INTRAVENOUS at 13:42

## 2025-03-02 RX ADMIN — MORPHINE SULFATE 4 MG: 4 INJECTION INTRAVENOUS at 13:29

## 2025-03-02 RX ADMIN — IOPAMIDOL 136 ML: 755 INJECTION, SOLUTION INTRAVENOUS at 13:42

## 2025-03-02 ASSESSMENT — ACTIVITIES OF DAILY LIVING (ADL)
ADLS_ACUITY_SCORE: 41

## 2025-03-02 ASSESSMENT — COLUMBIA-SUICIDE SEVERITY RATING SCALE - C-SSRS
6. HAVE YOU EVER DONE ANYTHING, STARTED TO DO ANYTHING, OR PREPARED TO DO ANYTHING TO END YOUR LIFE?: NO
1. IN THE PAST MONTH, HAVE YOU WISHED YOU WERE DEAD OR WISHED YOU COULD GO TO SLEEP AND NOT WAKE UP?: NO
2. HAVE YOU ACTUALLY HAD ANY THOUGHTS OF KILLING YOURSELF IN THE PAST MONTH?: NO

## 2025-03-02 NOTE — ED PROVIDER NOTES
"ED Provider Note  [facility]      History     Chief Complaint   Patient presents with    Post-op Problem     Pt underwent panniculectomy 2/12 with left and right PASTORA drains. Pt BIB EMS for c/o \"leaking from where they cut me open\". PASTORA drains with observed red colored drainage. Pt denies increase in pain and fever.      HPI  Kameron Park is a 59 year old female with a history of recent panniculectomy with left and right PASTORA drains in place (2/12/2025) and HTN who presents to the emergency department via EMS for a postoperative problem.  Patient reports red drainage from PASTORA drains for the past 3 days.  She notes the drainage has not changed in color since surgery.  Patient denies any new pain.  She states that she has been having lower right quadrant abdominal pain that has been present since the surgery.  She denies fever.        Past Medical History  Past Medical History:   Diagnosis Date    Anemia     Asthma     Bipolar disorder (H)     Borderline personality disorder (H)     Depression     Excess skin of abdomen 01/2025    History of lumbar fusion 07/14/2024    L4-5    Obesity     PTSD (post-traumatic stress disorder)     TBI (traumatic brain injury) (H)     Urinary incontinence      Past Surgical History:   Procedure Laterality Date    LEFT CARPAL TUNNEL RELEASE   2009    MAMMOPLASTY REDUCTION BILATERAL  2012    MINIMALLY INVASIVE TRANSFORAMINAL LUMBAR INTERBODY FUSION FIXATION L4-L5 WITH NEURO MONITORING  07/14/2024    PANNICULECTOMY N/A 2/12/2025    Procedure: PANNICULECTOMY;  Surgeon: SHANE Camarillo MD;  Location: [facility] OR    Right carpal tunnel release   2009     albuterol (PROAIR HFA/PROVENTIL HFA/VENTOLIN HFA) 108 (90 BASE) MCG/ACT Inhaler  capsaicin (ZOSTRIX) 0.025 % external cream  diazepam (VALIUM) 5 MG tablet  escitalopram (LEXAPRO) 20 MG tablet  FLUoxetine (PROZAC) 20 MG capsule  FLUoxetine (PROZAC) 40 MG capsule  fluticasone (FLONASE) 50 MCG/ACT nasal " "spray  methocarbamol (ROBAXIN) 500 MG tablet  mometasone-formoterol (DULERA) 200-5 MCG/ACT inhaler  naproxen (NAPROSYN) 500 MG tablet  nystatin (MYCOSTATIN) 697721 UNIT/GM external powder  ondansetron (ZOFRAN ODT) 4 MG ODT tab  oxyCODONE (ROXICODONE) 5 MG tablet  senna-docusate (SENOKOT-S/PERICOLACE) 8.6-50 MG tablet  topiramate (TOPAMAX) 25 MG tablet  traZODone (DESYREL) 100 MG tablet      Allergies   Allergen Reactions    Fish-Derived Products Anaphylaxis    Seafood Swelling    Beta Adrenergic Blockers Other (See Comments)      Contradiction to allergy shots    Pollen Extract Other (See Comments)    Seasonal Allergies      Other reaction(s): Runny Nose     Family History  Family History   Problem Relation Age of Onset    Anesthesia Reaction No family hx of     Deep Vein Thrombosis (DVT) No family hx of      Social History   Social History     Tobacco Use    Smoking status: Former     Current packs/day: 0.10     Types: Cigarettes    Smokeless tobacco: Never    Tobacco comments:     Quit a couple years ago   Vaping Use    Vaping status: Never Used   Substance Use Topics    Alcohol use: Not Currently    Drug use: Not Currently      A medically appropriate review of systems was performed with pertinent positives and negatives noted in the HPI, and all other systems negative.    Physical Exam   BP: 137/70  Pulse: 89  Temp: 98.2  F (36.8  C)  Resp: 17  Height: 162.6 cm (5' 4\")  Weight: 105.7 kg (233 lb)  SpO2: 98 %  Physical Exam  Vitals and nursing note reviewed.   Constitutional:       General: She is not in acute distress.     Appearance: Normal appearance. She is not diaphoretic.   HENT:      Head: Atraumatic.      Mouth/Throat:      Mouth: Mucous membranes are moist.   Eyes:      General: No scleral icterus.     Conjunctiva/sclera: Conjunctivae normal.   Cardiovascular:      Rate and Rhythm: Normal rate.      Heart sounds: Normal heart sounds.   Pulmonary:      Effort: No respiratory distress.      Breath sounds: " Normal breath sounds.   Abdominal:      General: Abdomen is flat.      Comments: Bilateral PASTORA drains appear to be draining, do not appear infected, wound is large and does appear to be seeping serous fluid, but no redness, erythema, fluctuance or tenderness to palpation   Musculoskeletal:      Cervical back: Neck supple.   Skin:     General: Skin is warm.      Findings: No rash.   Neurological:      Mental Status: She is alert.           ED Course, Procedures, & Data      Procedures                No results found for any visits on 03/02/25.  Medications - No data to display  Labs Ordered and Resulted from Time of ED Arrival to Time of ED Departure - No data to display  No orders to display          Critical care was not performed.     Medical Decision Making  The patient's presentation was of high complexity (an acute health issue posing potential threat to life or bodily function).    The patient's evaluation involved:  review of external note(s) from 3+ sources (see separate area of note for details)  review of 3+ test result(s) ordered prior to this encounter (see separate area of note for details)  strong consideration of a test (CT abdomen pelvis) that was ultimately deferred  ordering and/or review of 3+ test(s) in this encounter (see separate area of note for details)  discussion of management or test interpretation with another health professional (surgery)    The patient's management necessitated moderate risk (order and review of multiple labs, discussion with consultant, consideration of CT scan, coordination of care and disposition home, prescription of antibiotic).    Assessment & Plan    Kameron Park is a 59 year old female with a history of recent panniculectomy with left and right PASTORA drains in place (2/12/2025) and HTN who presents to the emergency department via EMS with drainage from surgical site.     Patient was seen by Dr. Shelton of plastic surgery service who recommended patient be placed in  a larger abdominal binder, was not wearing the binder she was given at time of discharge previously because it was too tight.      Plastic surgery recommendations are that if patient has elevations in CRP or white blood cell count, discharged home with Keflex, and recommends ordering a larger abdominal binder.    As patient has a reassuring physical exam, reassuring vital signs, but does have an elevation in her CRP, per plastic surgery recommendation will discharge home with a prescription for Keflex.  Also discussed the fact that her hemoglobin today is 7.9, from baseline around 10 before surgery.  But as patient has stable vital signs, does not appear to be acutely bleeding, discussed case again with plastic surgery who again recommended discharge home with urgent follow-up, and they will make follow-up in their clinic on Wednesday as opposed to Friday.        Requested binder from the operating room here, and will discharge patient home with strict return precautions to come back to the emergency room for new or worsening symptoms, prescription for Keflex, new abdominal binder, and instructions to follow-up with her plastic surgeons as scheduled.    On reassessment, patient appeared to be in slightly increased discomfort,, so given hemoglobin drop and CRP, ordered CT abdomen pelvis.  After CT abdomen pelvis which showed fluid collections but drains in place as well as likely inflammation/infection, discussed case with plastic surgery who again said patient was safe to discharge home.  Went back and spoke with the patient who also agrees that she would like to discharge home although I did offer her admission.  At this point as she has stable vital signs, reassuring physical exam, would like to discharge home and is demonstrating capacity, we will discharge her home with 2 antibiotics, new abdominal binder, and strict return precautions to come back to the emergency room for new or worsening symptoms.    I have  reviewed the nursing notes. I have reviewed the findings, diagnosis, plan and need for follow up with the patient.    New Prescriptions    No medications on file       Final diagnoses:   None   I, Aurora Yanez, am serving as a trained medical scribe to document services personally performed by Ken Barton MD, based on the provider's statements to me.     IKen MD, was physically present and have reviewed and verified the accuracy of this note documented by Aurora Yanez.     Ken Barton MD  [facility] EMERGENCY DEPARTMENT  3/2/2025     Ken Barton MD  03/02/25 1301       Ken Barton MD  03/02/25 3838

## 2025-03-02 NOTE — ED TRIAGE NOTES
"Post op wound assessment - Pt underwent panniculectomy 2/12 with left and right PASTORA drains. Pt BIB EMS for c/o \"leaking from where they cut me open\". PASTORA drains with observed red colored drainage. Pt denies increase in pain and fever.      Triage Assessment (Adult)       Row Name 03/02/25 0947          Triage Assessment    Airway WDL WDL        Respiratory WDL    Respiratory WDL WDL        Skin Circulation/Temperature WDL    Skin Circulation/Temperature WDL WDL        Cardiac WDL    Cardiac WDL WDL        Peripheral/Neurovascular WDL    Peripheral Neurovascular WDL WDL        Cognitive/Neuro/Behavioral WDL    Cognitive/Neuro/Behavioral WDL WDL                     "

## 2025-03-02 NOTE — CONSULTS
Plastic Surgery Consult Note  03/02/2025           Chief complaint/Reason for consult: Increased abdominal drainage    Assessment/Plan   Kameron Park is a 59 year old female with BMI 39, with recent panniculectomy performed on 2/12/2025 with Dr. Camarillo, and recently seen in clinic 2/26 2025 with drain outputs greater than 50 cc a day.  Patient has increased weeping of drainage from incision without any dehiscence.  She has not been wearing her abdominal binder. she has a normal white count but elevated CRP, and mild tenderness along the incision.  Will treat with oral antibiotics.     -Oral abx (bactrim) MRSA coverage  -Order large abdominal binder to help with patient fit.  Discussed importance of wearing abdominal binder  -Discussed importance of recording PASTORA drain output and holding it to suction  -Discussed warning signs that would necessitate earlier follow-up including spreading redness warmth tenderness pain fevers chills.  Patient and her daughter understood the treatment plan and had no further questions.  -Keep PASTORA drains in until lower output less than 30 cc a day ensure that drains do not fall out and be careful with transfers and turns.   -Follow-up this week either Wednesday or Friday message sent to schedulers    Discussed with Dr. Rabia Shelton PGY4  Plastic and Reconstructive Surgery        HPI:  Kameron Park is a 59 year old female with BMI 39, pmhx anemia, depression, bipolar disorder, with recent panniculectomy performed on 2/12/2025 with Dr. Camarillo, and recently seen in clinic 2/26 2025 with drain outputs greater than 50 cc a day.  Patient has increased weeping of drainage from incision without any dehiscence.  She has not been wearing her abdominal binder.  She says that it irritates her.     Furthermore they just learned that the drain needs to be placed to suction a few days ago.  They had a telephone encounter with Ellie Armstrong.     She denies any fevers chills vomiting  nausea diarrhea, redness, worsening abdominal pain.       PAST MEDICAL HISTORY:   Past Medical History:   Diagnosis Date    Anemia     Asthma     Bipolar disorder (H)     Borderline personality disorder (H)     Depression     Excess skin of abdomen 01/2025    History of lumbar fusion 07/14/2024    L4-5    Obesity     PTSD (post-traumatic stress disorder)     TBI (traumatic brain injury) (H)     Urinary incontinence        SURGICAL HISTORY:   Past Surgical History:   Procedure Laterality Date    LEFT CARPAL TUNNEL RELEASE   2009    MAMMOPLASTY REDUCTION BILATERAL  2012    MINIMALLY INVASIVE TRANSFORAMINAL LUMBAR INTERBODY FUSION FIXATION L4-L5 WITH NEURO MONITORING  07/14/2024    PANNICULECTOMY N/A 2/12/2025    Procedure: PANNICULECTOMY;  Surgeon: SHANE Camarillo MD;  Location: [facility] OR    Right carpal tunnel release   2009       ALLERGIES:      Allergies   Allergen Reactions    Fish-Derived Products Anaphylaxis    Seafood Swelling    Beta Adrenergic Blockers Other (See Comments)      Contradiction to allergy shots    Pollen Extract Other (See Comments)    Seasonal Allergies      Other reaction(s): Runny Nose       MEDICATIONS:  Current Meds:   Current Facility-Administered Medications   Medication Dose Route Frequency Provider Last Rate Last Admin    iopamidol (ISOVUE-370) solution 136 mL  136 mL Intravenous Once Ken Barton MD        ondansetron (ZOFRAN) injection 4 mg  4 mg Intravenous Once Ken Barton MD   4 mg at 03/02/25 1329    sodium chloride 0.9 % bag 500mL for CT scan flush use  88 mL Intravenous Once Ken Barton MD         Current Outpatient Medications   Medication Sig Dispense Refill    cephALEXin (KEFLEX) 500 MG capsule Take 1 capsule (500 mg) by mouth 4 times daily for 10 days. 40 capsule 0    sulfamethoxazole-trimethoprim (BACTRIM DS) 800-160 MG tablet Take 1 tablet by mouth 2 times daily for 10 days. 20 tablet 0    albuterol (PROAIR HFA/PROVENTIL HFA/VENTOLIN HFA) 108 (90 BASE)  MCG/ACT Inhaler Inhale 1-2 puffs into the lungs      capsaicin (ZOSTRIX) 0.025 % external cream Apply 1 Application. topically.      diazepam (VALIUM) 5 MG tablet Take 5 mg by mouth as needed      escitalopram (LEXAPRO) 20 MG tablet Take 1 tablet by mouth every morning.      FLUoxetine (PROZAC) 20 MG capsule Take 20 mg by mouth every morning. Take along with 40 mg      FLUoxetine (PROZAC) 40 MG capsule Take 40 mg by mouth every morning. With 20mg      fluticasone (FLONASE) 50 MCG/ACT nasal spray Spray 2 sprays in nostril      methocarbamol (ROBAXIN) 500 MG tablet Take 1,000 mg by mouth. (Patient not taking: Reported on 2/26/2025)      mometasone-formoterol (DULERA) 200-5 MCG/ACT inhaler Inhale 2 puffs into the lungs 2 times daily.      naproxen (NAPROSYN) 500 MG tablet Take 500 mg by mouth 2 times daily.      nystatin (MYCOSTATIN) 829297 UNIT/GM external powder Apply topically as needed for other (rashes and sores) 60 g 1    ondansetron (ZOFRAN ODT) 4 MG ODT tab Take 1 tablet (4 mg) by mouth every 8 hours as needed for nausea. 4 tablet 0    oxyCODONE (ROXICODONE) 5 MG tablet Take 1-2 tablets (5-10 mg) by mouth every 6 hours as needed for moderate to severe pain. 20 tablet 0    senna-docusate (SENOKOT-S/PERICOLACE) 8.6-50 MG tablet Take 1-2 tablets by mouth 2 times daily. 30 tablet 0    topiramate (TOPAMAX) 25 MG tablet Take 2 tablets (50 mg) by mouth 2 times daily. 120 tablet 3    traZODone (DESYREL) 100 MG tablet Take 100 mg by mouth at bedtime.           SOCIAL HISTORY:   Social History     Socioeconomic History    Marital status:      Spouse name: None    Number of children: None    Years of education: None    Highest education level: None   Tobacco Use    Smoking status: Former     Current packs/day: 0.10     Types: Cigarettes    Smokeless tobacco: Never    Tobacco comments:     Quit a couple years ago   Vaping Use    Vaping status: Never Used   Substance and Sexual Activity    Alcohol use: Not Currently     Drug use: Not Currently     Social Drivers of Health     Food Insecurity: Unknown (7/15/2024)    Received from [facility]     Hunger Vital Sign     Ran Out of Food in the Last Year: Never true   Transportation Needs: No Transportation Needs (7/15/2024)    Received from [facility]     PRAPARE - Transportation     Lack of Transportation (Medical): No     Lack of Transportation (Non-Medical): No   Interpersonal Safety: Low Risk  (2/12/2025)    Interpersonal Safety     Do you feel physically and emotionally safe where you currently live?: Yes     Within the past 12 months, have you been hit, slapped, kicked or otherwise physically hurt by someone?: No     Within the past 12 months, have you been humiliated or emotionally abused in other ways by your partner or ex-partner?: No   Housing Stability: Unknown (7/15/2024)    Received from [facility]     Housing Stability Vital Sign     Unable to Pay for Housing in the Last Year: No     Homeless in the Last Year: No         REVIEW OF SYSTEMS: Negative other than what's stated in HPI    Objective:  Temp:  [98.2  F (36.8  C)] 98.2  F (36.8  C)  Pulse:  [89] 89  Resp:  [17] 17  BP: (137)/(70) 137/70  SpO2:  [98 %] 98 %    No intake/output data recorded.     Physical Exam:   Gen: Awake, alert, NAD  Resp: NLB on RA  Abd: soft, mildly tender along incision sites, with weeping drainage, serosanguineous fluid from right drain and left drain, high output from the right drain.  Tenderness more to the right than the left.  No rebound tenderness  Ext: WWP, no edema                 Labs:  Recent Labs   Lab 03/02/25  1136   WBC 5.1   HGB 7.9*   *       Recent Labs   Lab 03/02/25  1136      POTASSIUM 3.5   CHLORIDE 105   CO2 23   BUN 4.1*   CR 0.65   *   STEPHEN 8.1*       Imaging:  None

## 2025-03-02 NOTE — DISCHARGE INSTRUCTIONS
Please call the surgery coordinator to schedule a return visit on Wednesday, the surgeons called their coordinator and should be waiting for your call.  Here in the emergency department we are discharging home with a larger abdominal binder as well as 2 antibiotics.  You were seen by plastic surgery, and I encourage you to follow-up with your plastic surgery team as scheduled  If you develop any new or worsening symptoms please return immediately to the emergency department.

## 2025-04-09 ENCOUNTER — OFFICE VISIT (OUTPATIENT)
Dept: PLASTIC SURGERY | Facility: CLINIC | Age: 60
End: 2025-04-09
Payer: COMMERCIAL

## 2025-04-09 VITALS
BODY MASS INDEX: 39.27 KG/M2 | SYSTOLIC BLOOD PRESSURE: 129 MMHG | HEART RATE: 70 BPM | OXYGEN SATURATION: 99 % | DIASTOLIC BLOOD PRESSURE: 75 MMHG | WEIGHT: 228.8 LBS

## 2025-04-09 DIAGNOSIS — Z98.890 S/P PANNICULECTOMY: Primary | ICD-10-CM

## 2025-04-09 ASSESSMENT — PAIN SCALES - GENERAL: PAINLEVEL_OUTOF10: MODERATE PAIN (5)

## 2025-04-09 NOTE — LETTER
4/9/2025       RE: Kameron Park  [address]  [facility] [address]     Dear Colleague,    Thank you for referring your patient, Kameron Park, to the [facility] [place] at [facility]. Please see a copy of my visit note below.    PRESENTING COMPLAINT:  Post-operative visit s/p panniculectomy done on 2/12/2025     HISTORY OF PRESENTING COMPLAINT: The patient is here for post-operative visit.  The patient is being seen in the presence of my nurse.      Overall doing well.  Still complains of some nonspecific pain in the right lateral abdominal area.  No drainage.     On exam: Vital signs stable afebrile.  No evidence for infection.  Fully healed except for a very superficial opening in the right middle portion of the incision.  The right lateral aspect is soft no infection no seroma no pathology seen.     ASSESSMENT AND PLAN:  Based upon the above findings, the patient is here for post-operative visit.      Reassured the patient.  Asked her to stop wearing her binder.  The chafing may interrupt healing.  Advised moisturization.  The pain probably is related to scarring versus nerve issues which should resolve over the ensuing months.  I will see her back as needed.    All questions were answered.  The patient was happy with the visit.      Again, thank you for allowing me to participate in the care of your patient.      Sincerely,    SHANE Camarillo MD

## 2025-04-09 NOTE — NURSING NOTE
Chief Complaint   Patient presents with    Surgical Followup     6 week post-op DOS 2/12       Vitals:    04/09/25 0954   BP: 129/75   BP Location: Left arm   Patient Position: Sitting   Cuff Size: Adult Large   Pulse: 70   SpO2: 99%   Weight: 228 lb 12.8 oz       Body mass index is 39.27 kg/m .                          Damaris Schwarz, EMT

## 2025-04-09 NOTE — PROGRESS NOTES
PRESENTING COMPLAINT:  Post-operative visit s/p panniculectomy done on 2/12/2025     HISTORY OF PRESENTING COMPLAINT: The patient is here for post-operative visit.  The patient is being seen in the presence of my nurse.      Overall doing well.  Still complains of some nonspecific pain in the right lateral abdominal area.  No drainage.     On exam: Vital signs stable afebrile.  No evidence for infection.  Fully healed except for a very superficial opening in the right middle portion of the incision.  The right lateral aspect is soft no infection no seroma no pathology seen.     ASSESSMENT AND PLAN:  Based upon the above findings, the patient is here for post-operative visit.      Reassured the patient.  Asked her to stop wearing her binder.  The chafing may interrupt healing.  Advised moisturization.  The pain probably is related to scarring versus nerve issues which should resolve over the ensuing months.  I will see her back as needed.    All questions were answered.  The patient was happy with the visit.

## 2025-07-07 ENCOUNTER — OFFICE VISIT (OUTPATIENT)
Dept: ENDOCRINOLOGY | Facility: CLINIC | Age: 60
End: 2025-07-07
Payer: COMMERCIAL

## 2025-07-07 VITALS
HEIGHT: 64 IN | DIASTOLIC BLOOD PRESSURE: 85 MMHG | OXYGEN SATURATION: 99 % | HEART RATE: 64 BPM | WEIGHT: 210.2 LBS | SYSTOLIC BLOOD PRESSURE: 131 MMHG | BODY MASS INDEX: 35.89 KG/M2

## 2025-07-07 DIAGNOSIS — E66.01 CLASS 2 SEVERE OBESITY WITH SERIOUS COMORBIDITY AND BODY MASS INDEX (BMI) OF 39.0 TO 39.9 IN ADULT, UNSPECIFIED OBESITY TYPE (H): Primary | ICD-10-CM

## 2025-07-07 DIAGNOSIS — E66.812 CLASS 2 SEVERE OBESITY WITH SERIOUS COMORBIDITY AND BODY MASS INDEX (BMI) OF 39.0 TO 39.9 IN ADULT, UNSPECIFIED OBESITY TYPE (H): Primary | ICD-10-CM

## 2025-07-07 PROCEDURE — G2211 COMPLEX E/M VISIT ADD ON: HCPCS

## 2025-07-07 PROCEDURE — 99213 OFFICE O/P EST LOW 20 MIN: CPT

## 2025-07-07 PROCEDURE — 1125F AMNT PAIN NOTED PAIN PRSNT: CPT

## 2025-07-07 PROCEDURE — 3075F SYST BP GE 130 - 139MM HG: CPT

## 2025-07-07 PROCEDURE — 3079F DIAST BP 80-89 MM HG: CPT

## 2025-07-07 RX ORDER — TOPIRAMATE 25 MG/1
50 TABLET, FILM COATED ORAL 2 TIMES DAILY
Qty: 120 TABLET | Refills: 3 | Status: SHIPPED | OUTPATIENT
Start: 2025-07-07

## 2025-07-07 ASSESSMENT — PAIN SCALES - GENERAL: PAINLEVEL_OUTOF10: MODERATE PAIN (5)

## 2025-07-07 NOTE — PROGRESS NOTES
Return Medical Weight Management Note     Kameron Park  MRN:  [id number]  :  1965  SHOAIB:  2025    Dear Veena Stafford NP,    I had the pleasure of seeing your patient Kameron Park. She is a 60 year old female who I am continuing to see for treatment of obesity related to:        2022    12:52 PM   --   I have the following health issues associated with obesity High Blood Pressure    Asthma    Osteoarthritis (joint disease)   I have the following symptoms associated with obesity Knee Pain    Depression    Back Pain    Fatigue       Assessment & Plan   Problem List Items Addressed This Visit       Class 2 severe obesity with serious comorbidity and body mass index (BMI) of 39.0 to 39.9 in adult (H) - Primary    Relevant Medications    topiramate (TOPAMAX) 25 MG tablet    Other Relevant Orders    Basic metabolic panel        Continue Topiramate 50mg twice daily. Refills sent   Food goal - 3 consistent meals a day with protein with every meal   Activity goal - continue to walk as well   BMP ordered   Sruthi Cruz PA-C in 4 months       INTERVAL HISTORY:  First seen by Dr. Vallecillo in  - started Topiramate   Starting Weight - 336lbs   Last seen by Dr. Vallecillo 2022 and Continued Topiramate  First seen by me 2023 and continued topiramate 75mg   Last seen 10/17/2024 - had back surgery for spinal stenosis over the summer. Really limiting mobility and has seen some weight regain.       Since last visit had gotten panniculectomy with Dr. Camarillo. Has been recovering well and is really happy with her results.     Anti-obesity medication history    Current:   Topiramate 50mg - taking it twice daily. Was helpful with dose increase.       Recent diet changes: Eating 2 meals a day. Skipping breakfast, but tries to eat breakfast. Drinking 120oz of water daily. Has moms meals. Daughter will make the other meal. Wishes she could afford more fresh fruit/vegetables.     Recent exercise/activity changes:  back pain continues to improve, but is still there. Skin removal has helped with back pain. Walking daily     Recent stressors: high stress. Continues to see therapist.       CURRENT WEIGHT:   210 lbs 3.2 oz    Initial Weight (lbs): 295 lbs  Last Visits Weight: 103.1 kg (227 lb 3.2 oz)  Cumulative weight loss (lbs): 84.8  Weight Loss Percentage: 28.75%  Waist Circumference (cm): 109 cm    Wt Readings from Last 5 Encounters:   07/07/25 95.3 kg (210 lb 3.2 oz)   04/09/25 103.8 kg (228 lb 12.8 oz)   03/07/25 102 kg (224 lb 12.8 oz)   03/02/25 105.7 kg (233 lb)   02/26/25 105.7 kg (233 lb)             7/7/2025     1:34 PM   Changes and Difficulties   I have made the following changes to my diet since my last visit: Still have some problom eating   With regards to my diet, I am still struggling with: exercising and knowing what to eat right   I have made the following changes to my activity/exercise since my last visit: I try to do more walking   With regards to my activity/exercise, I am still struggling with: Not doing enough         MEDICATIONS:   Current Outpatient Medications   Medication Sig Dispense Refill    albuterol (PROAIR HFA/PROVENTIL HFA/VENTOLIN HFA) 108 (90 BASE) MCG/ACT Inhaler Inhale 1-2 puffs into the lungs      capsaicin (ZOSTRIX) 0.025 % external cream Apply 1 Application. topically.      diazepam (VALIUM) 5 MG tablet Take 5 mg by mouth as needed      escitalopram (LEXAPRO) 20 MG tablet Take 1 tablet by mouth every morning.      FLUoxetine (PROZAC) 20 MG capsule Take 20 mg by mouth every morning. Take along with 40 mg      FLUoxetine (PROZAC) 40 MG capsule Take 40 mg by mouth every morning. With 20mg      fluticasone (FLONASE) 50 MCG/ACT nasal spray Spray 2 sprays in nostril      methocarbamol (ROBAXIN) 500 MG tablet Take 1,000 mg by mouth.      mometasone-formoterol (DULERA) 200-5 MCG/ACT inhaler Inhale 2 puffs into the lungs 2 times daily.      naproxen (NAPROSYN) 500 MG tablet Take 500 mg by mouth  "2 times daily.      nystatin (MYCOSTATIN) 884133 UNIT/GM external powder Apply topically as needed for other (rashes and sores) 60 g 1    topiramate (TOPAMAX) 25 MG tablet Take 2 tablets (50 mg) by mouth 2 times daily. 120 tablet 3    traZODone (DESYREL) 100 MG tablet Take 100 mg by mouth at bedtime.             7/7/2025     1:34 PM   Weight Loss Medication History Reviewed With Patient   Which weight loss medications are you currently taking on a regular basis? Topamax (topiramate)   Are you having any side effects from the weight loss medication that we have prescribed you? No               Objective    /85 (BP Location: Left arm, Patient Position: Sitting, Cuff Size: Adult Large)   Pulse 64   Ht 1.626 m (5' 4\")   Wt 95.3 kg (210 lb 3.2 oz)   SpO2 99%   BMI 36.08 kg/m        PHYSICAL EXAM:  GENERAL: alert and no distress  EYES: Eyes grossly normal to inspection.  No discharge or erythema, or obvious scleral/conjunctival abnormalities.  RESP: No audible wheeze, cough, or visible cyanosis.    SKIN: Visible skin clear. No significant rash, abnormal pigmentation or lesions.  NEURO: Cranial nerves grossly intact.  Mentation and speech appropriate for age.  PSYCH: Appropriate affect, tone, and pace of words        Sincerely,    Sruthi Forunier PA-C      24 minutes spent by me on the date of the encounter doing chart review, history and exam, documentation and further activities per the note    The longitudinal plan of care for the diagnosis(es)/condition(s) as documented were addressed during this visit. Due to the added complexity in care, I will continue to support [facility] in the subsequent management and with ongoing continuity of care.  "

## 2025-07-07 NOTE — LETTER
2025       RE: Kameron Park  [address]  [facility] [address]     Dear Colleague,    Thank you for referring your patient, Kameron Park, to the [facility] [place] at [facility]. Please see a copy of my visit note below.      Return Medical Weight Management Note     Kameron Park  MRN:  [id number]  :  1965  SHOAIB:  2025    Dear Veena Stafford NP,    I had the pleasure of seeing your patient Kameron Park. She is a 60 year old female who I am continuing to see for treatment of obesity related to:        2022    12:52 PM   --   I have the following health issues associated with obesity High Blood Pressure    Asthma    Osteoarthritis (joint disease)   I have the following symptoms associated with obesity Knee Pain    Depression    Back Pain    Fatigue       Assessment & Plan  Problem List Items Addressed This Visit       Class 2 severe obesity with serious comorbidity and body mass index (BMI) of 39.0 to 39.9 in adult (H) - Primary    Relevant Medications    topiramate (TOPAMAX) 25 MG tablet    Other Relevant Orders    Basic metabolic panel        Continue Topiramate 50mg twice daily. Refills sent   Food goal - 3 consistent meals a day with protein with every meal   Activity goal - continue to walk as well   BMP ordered   Sruthi Cruz PA-C in 4 months       INTERVAL HISTORY:  First seen by Dr. Vallecillo in  - started Topiramate   Starting Weight - 336lbs   Last seen by Dr. Vallecillo 2022 and Continued Topiramate  First seen by me 2023 and continued topiramate 75mg   Last seen 10/17/2024 - had back surgery for spinal stenosis over the summer. Really limiting mobility and has seen some weight regain.       Since last visit had gotten panniculectomy with Dr. Camarillo. Has been recovering well and is really happy with her results.     Anti-obesity medication history    Current:   Topiramate 50mg -  taking it twice daily. Was helpful with dose increase.       Recent diet changes: Eating 2 meals a day. Skipping breakfast, but tries to eat breakfast. Drinking 120oz of water daily. Has moms meals. Daughter will make the other meal. Wishes she could afford more fresh fruit/vegetables.     Recent exercise/activity changes: back pain continues to improve, but is still there. Skin removal has helped with back pain. Walking daily     Recent stressors: high stress. Continues to see therapist.       CURRENT WEIGHT:   210 lbs 3.2 oz    Initial Weight (lbs): 295 lbs  Last Visits Weight: 103.1 kg (227 lb 3.2 oz)  Cumulative weight loss (lbs): 84.8  Weight Loss Percentage: 28.75%  Waist Circumference (cm): 109 cm    Wt Readings from Last 5 Encounters:   07/07/25 95.3 kg (210 lb 3.2 oz)   04/09/25 103.8 kg (228 lb 12.8 oz)   03/07/25 102 kg (224 lb 12.8 oz)   03/02/25 105.7 kg (233 lb)   02/26/25 105.7 kg (233 lb)             7/7/2025     1:34 PM   Changes and Difficulties   I have made the following changes to my diet since my last visit: Still have some problom eating   With regards to my diet, I am still struggling with: exercising and knowing what to eat right   I have made the following changes to my activity/exercise since my last visit: I try to do more walking   With regards to my activity/exercise, I am still struggling with: Not doing enough         MEDICATIONS:   Current Outpatient Medications   Medication Sig Dispense Refill     albuterol (PROAIR HFA/PROVENTIL HFA/VENTOLIN HFA) 108 (90 BASE) MCG/ACT Inhaler Inhale 1-2 puffs into the lungs       capsaicin (ZOSTRIX) 0.025 % external cream Apply 1 Application. topically.       diazepam (VALIUM) 5 MG tablet Take 5 mg by mouth as needed       escitalopram (LEXAPRO) 20 MG tablet Take 1 tablet by mouth every morning.       FLUoxetine (PROZAC) 20 MG capsule Take 20 mg by mouth every morning. Take along with 40 mg       FLUoxetine (PROZAC) 40 MG capsule Take 40 mg by mouth  "every morning. With 20mg       fluticasone (FLONASE) 50 MCG/ACT nasal spray Spray 2 sprays in nostril       methocarbamol (ROBAXIN) 500 MG tablet Take 1,000 mg by mouth.       mometasone-formoterol (DULERA) 200-5 MCG/ACT inhaler Inhale 2 puffs into the lungs 2 times daily.       naproxen (NAPROSYN) 500 MG tablet Take 500 mg by mouth 2 times daily.       nystatin (MYCOSTATIN) 450213 UNIT/GM external powder Apply topically as needed for other (rashes and sores) 60 g 1     topiramate (TOPAMAX) 25 MG tablet Take 2 tablets (50 mg) by mouth 2 times daily. 120 tablet 3     traZODone (DESYREL) 100 MG tablet Take 100 mg by mouth at bedtime.             7/7/2025     1:34 PM   Weight Loss Medication History Reviewed With Patient   Which weight loss medications are you currently taking on a regular basis? Topamax (topiramate)   Are you having any side effects from the weight loss medication that we have prescribed you? No               Objective   /85 (BP Location: Left arm, Patient Position: Sitting, Cuff Size: Adult Large)   Pulse 64   Ht 1.626 m (5' 4\")   Wt 95.3 kg (210 lb 3.2 oz)   SpO2 99%   BMI 36.08 kg/m        PHYSICAL EXAM:  GENERAL: alert and no distress  EYES: Eyes grossly normal to inspection.  No discharge or erythema, or obvious scleral/conjunctival abnormalities.  RESP: No audible wheeze, cough, or visible cyanosis.    SKIN: Visible skin clear. No significant rash, abnormal pigmentation or lesions.  NEURO: Cranial nerves grossly intact.  Mentation and speech appropriate for age.  PSYCH: Appropriate affect, tone, and pace of words        Sincerely,    Sruthi Fournier PA-C      24 minutes spent by me on the date of the encounter doing chart review, history and exam, documentation and further activities per the note    The longitudinal plan of care for the diagnosis(es)/condition(s) as documented were addressed during this visit. Due to the added complexity in care, I will continue to support Kameron in " the subsequent management and with ongoing continuity of care.    Again, thank you for allowing me to participate in the care of your patient.      Sincerely,    Sruthi Fournier PA-C

## 2025-07-07 NOTE — NURSING NOTE
"Chief Complaint   Patient presents with    RECHECK     Follow-up.       Vitals:    07/07/25 1344   BP: 131/85   BP Location: Left arm   Patient Position: Sitting   Cuff Size: Adult Large   Pulse: 64   SpO2: 99%   Weight: 95.3 kg (210 lb 3.2 oz)   Height: 1.626 m (5' 4\")       Body mass index is 36.08 kg/m .      Tod Beebe, EMT   "

## 2025-07-07 NOTE — PATIENT INSTRUCTIONS
Visit Plan:     Continue Topiramate 50mg twice daily. Refills sent   Food goal - 3 consistent meals a day with protein with every meal   Activity goal - continue to walk as well   BMP ordered   Sruthi Cruz PA-C in 4 months

## 2025-07-08 ENCOUNTER — MYC MEDICAL ADVICE (OUTPATIENT)
Dept: ENDOCRINOLOGY | Facility: CLINIC | Age: 60
End: 2025-07-08
Payer: COMMERCIAL

## 2025-08-18 ENCOUNTER — MYC REFILL (OUTPATIENT)
Dept: ENDOCRINOLOGY | Facility: CLINIC | Age: 60
End: 2025-08-18
Payer: COMMERCIAL

## 2025-08-18 DIAGNOSIS — E66.01 CLASS 2 SEVERE OBESITY WITH SERIOUS COMORBIDITY AND BODY MASS INDEX (BMI) OF 39.0 TO 39.9 IN ADULT, UNSPECIFIED OBESITY TYPE (H): ICD-10-CM

## 2025-08-18 DIAGNOSIS — E66.812 CLASS 2 SEVERE OBESITY WITH SERIOUS COMORBIDITY AND BODY MASS INDEX (BMI) OF 39.0 TO 39.9 IN ADULT, UNSPECIFIED OBESITY TYPE (H): ICD-10-CM

## 2025-08-18 RX ORDER — TOPIRAMATE 25 MG/1
50 TABLET, FILM COATED ORAL 2 TIMES DAILY
Qty: 120 TABLET | Refills: 3 | Status: CANCELLED | OUTPATIENT
Start: 2025-08-18

## (undated) DEVICE — STPL SKIN 35W ROTATING HEAD PRW35

## (undated) DEVICE — ESU ELEC BLADE 6" COATED E1450-6

## (undated) DEVICE — DRSG GAUZE 4X4" 3033

## (undated) DEVICE — DRAIN JACKSON PRATT RESERVOIR 100ML SU130-1305

## (undated) DEVICE — NEPTUNE SMOKE EVAC ADAPTER

## (undated) DEVICE — DRAPE U SPLIT 74X120" 29440

## (undated) DEVICE — DRSG ABDOMINAL 07 1/2X8" 7197D

## (undated) DEVICE — CLIP APPLIER 13" LG LIGACLIP MCL20

## (undated) DEVICE — SOL NACL 0.9% IRRIG 500ML BOTTLE 2F7123

## (undated) DEVICE — BLADE CLIPPER DISP 4406

## (undated) DEVICE — NDL COUNTER 20CT 31142493

## (undated) DEVICE — DRAPE IOBAN INCISE 23X17" 6650EZ

## (undated) DEVICE — DRSG XEROFORM 1X8"

## (undated) DEVICE — SU DERMABOND PRINEO 42CM CLR422US

## (undated) DEVICE — GLOVE BIOGEL PI MICRO SZ 7.0 48570

## (undated) DEVICE — LINEN TOWEL PACK X5 5464

## (undated) DEVICE — ESU ELEC BLADE HEX-LOCKING 2.5" E1450X

## (undated) DEVICE — SUCTION MANIFOLD NEPTUNE 2 SYS 1 PORT 702-025-000

## (undated) DEVICE — PEN MARKING SKIN W/PAPER RULER 31145785

## (undated) DEVICE — PREP CHLORAPREP 26ML TINTED HI-LITE ORANGE 930815

## (undated) DEVICE — FILTER HEPA FLUID TRAP NEPTUNE 0703-040-001

## (undated) DEVICE — DRSG PRIMAPORE 03 1/8X6" 66000318

## (undated) DEVICE — SPONGE LAP 18X18" X8435

## (undated) DEVICE — BLADE KNIFE SURG 10 371110

## (undated) DEVICE — DRAIN JACKSON PRATT CHANNEL 15FR ROUND HUBLESS SIL JP-2228

## (undated) DEVICE — DRSG TEGADERM 4X10" 1627

## (undated) DEVICE — GLOVE BIOGEL PI MICRO INDICATOR UNDERGLOVE SZ 7.5 48975

## (undated) DEVICE — PACK ASC BREAST SBA1BPUMA / SB15BPUM1

## (undated) DEVICE — DRSG KERLIX 4 1/2"X4YDS ROLL 6730

## (undated) RX ORDER — TRANEXAMIC ACID 100 MG/ML
INJECTION, SOLUTION INTRAVENOUS
Status: DISPENSED
Start: 2025-02-12

## (undated) RX ORDER — FENTANYL CITRATE 50 UG/ML
INJECTION, SOLUTION INTRAMUSCULAR; INTRAVENOUS
Status: DISPENSED
Start: 2025-02-12

## (undated) RX ORDER — CEFAZOLIN SODIUM 1 G/3ML
INJECTION, POWDER, FOR SOLUTION INTRAMUSCULAR; INTRAVENOUS
Status: DISPENSED
Start: 2025-02-12

## (undated) RX ORDER — CEFAZOLIN SODIUM 2 G/50ML
SOLUTION INTRAVENOUS
Status: DISPENSED
Start: 2025-02-12

## (undated) RX ORDER — PROPOFOL 10 MG/ML
INJECTION, EMULSION INTRAVENOUS
Status: DISPENSED
Start: 2025-02-12

## (undated) RX ORDER — ACETAMINOPHEN 325 MG/1
TABLET ORAL
Status: DISPENSED
Start: 2025-02-12

## (undated) RX ORDER — BUPIVACAINE HYDROCHLORIDE 2.5 MG/ML
INJECTION, SOLUTION EPIDURAL; INFILTRATION; INTRACAUDAL
Status: DISPENSED
Start: 2025-02-12

## (undated) RX ORDER — HYDROMORPHONE HYDROCHLORIDE 1 MG/ML
INJECTION, SOLUTION INTRAMUSCULAR; INTRAVENOUS; SUBCUTANEOUS
Status: DISPENSED
Start: 2025-02-12